# Patient Record
Sex: MALE | Race: WHITE | NOT HISPANIC OR LATINO | Employment: OTHER | URBAN - METROPOLITAN AREA
[De-identification: names, ages, dates, MRNs, and addresses within clinical notes are randomized per-mention and may not be internally consistent; named-entity substitution may affect disease eponyms.]

---

## 2017-01-05 ENCOUNTER — OFFICE VISIT (OUTPATIENT)
Dept: FAMILY MEDICINE | Facility: CLINIC | Age: 82
End: 2017-01-05

## 2017-01-05 ENCOUNTER — AMBULATORY - HEALTHEAST (OUTPATIENT)
Dept: ADMINISTRATIVE | Facility: REHABILITATION | Age: 82
End: 2017-01-05

## 2017-01-05 VITALS
OXYGEN SATURATION: 99 % | SYSTOLIC BLOOD PRESSURE: 151 MMHG | BODY MASS INDEX: 23.22 KG/M2 | HEART RATE: 75 BPM | WEIGHT: 156.8 LBS | RESPIRATION RATE: 18 BRPM | HEIGHT: 69 IN | TEMPERATURE: 97.5 F | DIASTOLIC BLOOD PRESSURE: 74 MMHG

## 2017-01-05 DIAGNOSIS — R42 VERTIGO: ICD-10-CM

## 2017-01-05 DIAGNOSIS — R42 VERTIGO: Primary | ICD-10-CM

## 2017-01-05 NOTE — PATIENT INSTRUCTIONS
What: Dizziness/balance therapy referral  Where: [facility] [address] [place], [place]   When: Wednesday 1/18/17 9:00AM   Who is with: Giovanna - therapist  Telephone: [phone]  Fax: [phone]  Any additional comments: Arrive 15 minutes early  Scheduled by: manasa

## 2017-01-05 NOTE — PROGRESS NOTES
HPI:       Kevin Saldana is a 81 year old  male with a significant past medical history of hypertension and new issue of balance/vertigo troubles who presents for follow up of concern(s) listed below:    #Trouble with balance, dizziness: began in October, occurred around the time of cough/cold symptoms- affecting his ambulation, normally would walk several blocks per day, now he is not doing this- has to have a friend drive him to run his errands whereas before he would walk  -Walking is fine in the house, unsteady feeling occurs when he is out of the house which makes him feel anxious- stopped taking meclizine because it was not helping  -Continues to wear compression stockings during the day  -MRI brain obtained at [facility] 12/16/16:  Results normal, no stroke seen, no masses to explain his symptoms- did comment on sinusitis but pt denies congestion/sinus pain/drainage  -Overall, says his symptoms are maybe a little better but still stopping him from walking outside which is bothersome  -No falls or syncope  -Not brought on by head movement/sitting/standing         PMHX:     Patient Active Problem List   Diagnosis     Acute urinary retention     Health Care Home     S/P TURP     AK (actinic keratosis)     Benign essential hypertension     H/O removal of neck cyst       Current Outpatient Prescriptions   Medication Sig Dispense Refill     fluticasone (FLONASE) 50 MCG/ACT spray Spray 1-2 sprays into both nostrils daily 1 Bottle 11     hydrochlorothiazide (HYDRODIURIL) 25 MG tablet Take 1 tablet (25 mg) by mouth daily 30 tablet 3     metoprolol (LOPRESSOR) 100 MG tablet Take 1 tablet (100 mg) by mouth 2 times daily 60 tablet 3     polyethylene glycol (MIRALAX/GLYCOLAX) powder Take 17 g by mouth daily 510 g 11     order for DME Equipment being ordered: FRANCISCO JAVIER stockings 1 Device 0          Allergies   Allergen Reactions     Nkda [No Known Drug Allergies]        No results found for this or any previous visit  "(from the past 24 hour(s)).         Review of Systems:   5-Point Review of Systems Negative -- Except as noted above.          Physical Exam:     Filed Vitals:    01/05/17 0843   BP: 167/86   Pulse: 75   Temp: 97.5  F (36.4  C)   TempSrc: Oral   Resp: 18   Height: 5' 8.5\" (174 cm)   Weight: 156 lb 12.8 oz (71.124 kg)   SpO2: 99%     Body mass index is 23.49 kg/(m^2).    Gen alert, pleasant NAD  Heart rrr no murmurs  Lungs clear without wheezing or crackles  Neuro Rhomberg negative, EOM intact without nystagmus, CN normal, ambulating without assistance  Ext no swelling or calf tenderness    Office Visit on 04/11/2016   Component Date Value Ref Range Status     Glucose 04/11/2016 85.0  60.0 - 109.0 mg/dL Final     Urea Nitrogen 04/11/2016 12.0  7.0 - 30.0 mg/dL Final     Creatinine 04/11/2016 1.0  0.8 - 1.5 mg/dL Final     Sodium 04/11/2016 133.0  133.0 - 144.0 mmol/L Final     Potassium 04/11/2016 4.0  3.4 - 5.3 mmol/L Final     Chloride 04/11/2016 94.0  94.0 - 109.0 mmol/L Final     Carbon Dioxide 04/11/2016 28.0  20.0 - 32.0 mmol/L Final     Calcium 04/11/2016 9.2  8.5 - 10.4 mg/dL Final     eGFR Calculated (Non Black Referen* 04/11/2016 76.4  >60.0 mL/min Final     eGFR Calculated (Black Reference) 04/11/2016 92.5  >60.0 mL/min Final       Assessment and Plan     (R42) Vertigo  (primary encounter diagnosis)  Comment: MRI of brain normal, no stroke- reviewed with pt.  Continuing to have symptoms, not positional to suggest BPPV.  May be inner ear/vestibular neuritis type picture.  It continues to affect his activity, he normally walked several blocks per day  Plan: PHYSICAL THERAPY REFERRAL         -I did review with pt that he did not have a stroke, he is not on a statin so it would be an option to start one for stroke prevention given that pt is quite healthy and active for an 81 year old.  He states he is not interested in starting a new medicine    I do recommend balance/dizziness therapy to help with his " symptoms- discussed with pt who agrees to give it a try     Referral placed, meet with Alicia today to schedule    Return in 4 weeks to f/u, sooner if needed      Options for treatment and follow-up care were reviewed with the patient and/or guardian. Kevin Saldana and/or guardian engaged in the decision making process and verbalized understanding of the options discussed and agreed with the final plan.      Caitlin Thomas MD      Precepted today with: Lavonne Guzman MD

## 2017-01-09 NOTE — PROGRESS NOTES
Preceptor Attestation:  Patient's case reviewed and discussed with Caitlin Thomas MD resident and I evaluated the patient. I agree with written assessment and plan of care.  Supervising Physician:  GILDA GOSS MD  [facility]

## 2017-01-18 ENCOUNTER — OFFICE VISIT - HEALTHEAST (OUTPATIENT)
Dept: OCCUPATIONAL THERAPY | Facility: REHABILITATION | Age: 82
End: 2017-01-18

## 2017-01-18 DIAGNOSIS — R26.81 UNSTEADINESS ON FEET: ICD-10-CM

## 2017-01-18 DIAGNOSIS — R42 DIZZINESS: ICD-10-CM

## 2017-01-27 DIAGNOSIS — I10 BENIGN ESSENTIAL HYPERTENSION: Primary | ICD-10-CM

## 2017-01-27 RX ORDER — HYDROCHLOROTHIAZIDE 25 MG/1
25 TABLET ORAL DAILY
Qty: 30 TABLET | Refills: 11 | Status: SHIPPED | OUTPATIENT
Start: 2017-01-27 | End: 2018-01-02

## 2017-02-01 ENCOUNTER — OFFICE VISIT - HEALTHEAST (OUTPATIENT)
Dept: OCCUPATIONAL THERAPY | Facility: REHABILITATION | Age: 82
End: 2017-02-01

## 2017-02-01 DIAGNOSIS — R42 DIZZINESS: ICD-10-CM

## 2017-02-01 DIAGNOSIS — R26.81 UNSTEADINESS ON FEET: ICD-10-CM

## 2017-05-19 DIAGNOSIS — I10 BENIGN ESSENTIAL HYPERTENSION: ICD-10-CM

## 2017-05-22 RX ORDER — METOPROLOL TARTRATE 100 MG
100 TABLET ORAL 2 TIMES DAILY
Qty: 60 TABLET | Refills: 11 | Status: SHIPPED | OUTPATIENT
Start: 2017-05-22 | End: 2017-06-23

## 2017-05-25 ENCOUNTER — OFFICE VISIT (OUTPATIENT)
Dept: FAMILY MEDICINE | Facility: CLINIC | Age: 82
End: 2017-05-25

## 2017-05-25 VITALS
WEIGHT: 158.6 LBS | BODY MASS INDEX: 24.04 KG/M2 | HEART RATE: 68 BPM | HEIGHT: 68 IN | DIASTOLIC BLOOD PRESSURE: 70 MMHG | OXYGEN SATURATION: 98 % | TEMPERATURE: 97.8 F | RESPIRATION RATE: 16 BRPM | SYSTOLIC BLOOD PRESSURE: 130 MMHG

## 2017-05-25 DIAGNOSIS — R26.89 BALANCE PROBLEMS: ICD-10-CM

## 2017-05-25 DIAGNOSIS — I10 BENIGN ESSENTIAL HYPERTENSION: Primary | ICD-10-CM

## 2017-05-25 NOTE — PATIENT INSTRUCTIONS
Blood pressure looks great!  Stay active, think about using a cane when you are out for walks just for extra balance    See you next month, thanks for coming to clinic  Dr Thomas

## 2017-05-25 NOTE — PROGRESS NOTES
HPI:       Kevin Saldana is a 82 year old  male with a significant past medical history of hypertension who presents for follow up of concern(s) listed below:    #Trouble with balance, dizziness: began in October 2016, occurred around the time of cough/cold symptoms.  Normal brain MRI 12/2016  -Referred to balance/dizzy therapy:  Went twice- was given exercises to do, did these at home for a couple months.  Not sure if it helped or not  -Overall symptoms better- dizzy sensation is better but balance trouble still there at times, able to walk a little better  -Now the issue seems to be with balance- notices that he staggers when he starts walking a little too fast.  Very active and enjoys walking outside.  Does not use a cane or walker   -No falls   -Recently moved to [facility]:  Moved to an apartment, this is going well         PMHX:     Patient Active Problem List   Diagnosis     Health Care Home     S/P TURP     AK (actinic keratosis)     Benign essential hypertension     H/O removal of neck cyst       Current Outpatient Prescriptions   Medication Sig Dispense Refill     metoprolol (LOPRESSOR) 100 MG tablet Take 1 tablet (100 mg) by mouth 2 times daily 60 tablet 11     hydrochlorothiazide (HYDRODIURIL) 25 MG tablet Take 1 tablet (25 mg) by mouth daily 30 tablet 11     fluticasone (FLONASE) 50 MCG/ACT spray Spray 1-2 sprays into both nostrils daily 1 Bottle 11     polyethylene glycol (MIRALAX/GLYCOLAX) powder Take 17 g by mouth daily 510 g 11     order for DME Equipment being ordered: FRANCISCO JAVIER stockings 1 Device 0          Allergies   Allergen Reactions     Nkda [No Known Drug Allergies]        No results found for this or any previous visit (from the past 24 hour(s)).         Review of Systems:   5-Point Review of Systems Negative -- Except as noted above.          Physical Exam:     Vitals:    05/25/17 0853 05/25/17 0856   BP: 163/79 130/70   Pulse: 79 68   Resp: 16    Temp: 97.8  F (36.6  C)    TempSrc: Oral   "  SpO2: 98%    Weight: 158 lb 9.6 oz (71.9 kg)    Height: 5' 8.25\" (173.4 cm)      Body mass index is 23.94 kg/(m^2).    GENERAL APPEARANCE: healthy, alert and no distress  EYES: Eyes grossly normal to inspection, PERRL and conjunctivae and sclerae normal  NECK: no adenopathy, no asymmetry, masses, or scars and thyroid normal to palpation  RESP: lungs clear to auscultation - no rales, rhonchi or wheezes  CV: regular rates and rhythm, normal S1 S2, no S3 or S4, no murmur, click or rub, no peripheral edema and peripheral pulses strong  MS: no musculoskeletal defects are noted and gait is age appropriate without ataxia  SKIN: no suspicious lesions or rashes  NEURO: mentation intact, speech normal and cranial nerves 2-12 intact  PSYCH: mentation appears normal and affect normal/bright      Assessment and Plan     (I10) Benign essential hypertension  (primary encounter diagnosis)  Comment: well-controlled, goal BP <150/90 given age and balance/dizziness issues in the apst  Plan: Continue metoprolol, HCTZ.  No changes made today    (R26.89) Balance problems  Comment: gradually improving, dizziness sensation has resolved.  Seems to occur when walking briskly outside- encouraged patient to consider using a cane for his outdoor walking, says he will think about it- declined today      F/u in 1 month, sooner if needed    Options for treatment and follow-up care were reviewed with the patient and/or guardian. Kevin Saldana and/or guardian engaged in the decision making process and verbalized understanding of the options discussed and agreed with the final plan.      Caitlin Thomas MD      Precepted today with: Dr Tolentino    "

## 2017-05-25 NOTE — MR AVS SNAPSHOT
After Visit Summary   2017    Kevin Saldana    MRN: [id number]           Patient Information     Date Of Birth          1935        Visit Information        Provider Department      2017 9:00 AM Caitlin Thomas MD [facility]        Care Instructions    Blood pressure looks great!  Stay active, think about using a cane when you are out for walks just for extra balance    See you next month, thanks for coming to clinic  Dr Thomas          Follow-ups after your visit        Who to contact     Please call your clinic at [phone] to:    Ask questions about your health    Make or cancel appointments    Discuss your medicines    Learn about your test results    Speak to your doctor   If you have compliments or concerns about an experience at your clinic, or if you wish to file a complaint, please contact [facility] Patient Relations at [phone] or email us at [email or web address]         Additional Information About Your Visit        MyChart Information     Front Desk HQt is an electronic gateway that provides easy, online access to your medical records. With Microbion, you can request a clinic appointment, read your test results, renew a prescription or communicate with your care team.     To sign up for Front [employer]k HQt visit the website at [email or web address]   You will be asked to enter the access code listed below, as well as some personal information. Please follow the directions to create your username and password.     Your access code is: [email or web address]  Expires: 2017  9:32 AM     Your access code will  in 90 days. If you need help or a new code, please contact your [facility] or call [phone] for assistance.        Care EveryWhere ID     This is your Care EveryWhere ID. This could be used by other organizations to access your [place] medical records  [id number]        Your Vitals Were      "Pulse Temperature Respirations Height Pulse Oximetry BMI (Body Mass Index)    68 97.8  F (36.6  C) (Oral) 16 5' 8.25\" (173.4 cm) 98% 23.94 kg/m2       Blood Pressure from Last 3 Encounters:   05/25/17 130/70   01/05/17 151/74   12/09/16 151/78    Weight from Last 3 Encounters:   05/25/17 158 lb 9.6 oz (71.9 kg)   01/05/17 156 lb 12.8 oz (71.1 kg)   12/09/16 154 lb 6.4 oz (70 kg)              Today, you had the following     No orders found for display       Primary Care Provider Office Phone # Fax #    Caitlin Brii Thomas [phone]       [facility] [address] [place] [address]        Thank you!     Thank you for choosing [facility]  for your care. Our goal is always to provide you with excellent care. Hearing back from our patients is one way we can continue to improve our services. Please take a few minutes to complete the written survey that you may receive in the mail after your visit with us. Thank you!             Your Updated Medication List - Protect others around you: Learn how to safely use, store and throw away your medicines at www.disposemymeds.org.          This list is accurate as of: 5/25/17  9:32 AM.  Always use your most recent med list.                   Brand Name Dispense Instructions for use    fluticasone 50 MCG/ACT spray    FLONASE    1 Bottle    Spray 1-2 sprays into both nostrils daily       hydrochlorothiazide 25 MG tablet    HYDRODIURIL    30 tablet    Take 1 tablet (25 mg) by mouth daily       metoprolol 100 MG tablet    LOPRESSOR    60 tablet    Take 1 tablet (100 mg) by mouth 2 times daily       order for DME     1 Device    Equipment being ordered: FRANCISCO JAVIER stockings       polyethylene glycol powder    MIRALAX/GLYCOLAX    510 g    Take 17 g by mouth daily         "

## 2017-05-25 NOTE — PROGRESS NOTES
Preceptor Attestation:  Patient's case reviewed and discussed with Caitlin Thomas MD Patient seen and discussed with the resident.. I agree with assessment and plan of care.  Supervising Physician:  Viviana Tolentino DO  [facility]

## 2017-06-23 ENCOUNTER — OFFICE VISIT (OUTPATIENT)
Dept: FAMILY MEDICINE | Facility: CLINIC | Age: 82
End: 2017-06-23

## 2017-06-23 VITALS
SYSTOLIC BLOOD PRESSURE: 156 MMHG | OXYGEN SATURATION: 100 % | DIASTOLIC BLOOD PRESSURE: 77 MMHG | HEART RATE: 65 BPM | TEMPERATURE: 97.4 F | HEIGHT: 68 IN | WEIGHT: 157 LBS | BODY MASS INDEX: 23.79 KG/M2

## 2017-06-23 DIAGNOSIS — I10 BENIGN ESSENTIAL HYPERTENSION: Primary | ICD-10-CM

## 2017-06-23 DIAGNOSIS — R94.4 DECREASED GFR: ICD-10-CM

## 2017-06-23 DIAGNOSIS — R26.89 BALANCE PROBLEMS: ICD-10-CM

## 2017-06-23 DIAGNOSIS — K59.00 CONSTIPATION, UNSPECIFIED CONSTIPATION TYPE: ICD-10-CM

## 2017-06-23 LAB
BUN SERPL-MCNC: 16 MG/DL (ref 7–30)
CALCIUM SERPL-MCNC: 9.6 MG/DL (ref 8.5–10.4)
CHLORIDE SERPLBLD-SCNC: 99 MMOL/L (ref 94–109)
CO2 SERPL-SCNC: 29 MMOL/L (ref 20–32)
CREAT SERPL-MCNC: 1.3 MG/DL (ref 0.8–1.5)
EGFR CALCULATED (BLACK REFERENCE): 68.1 ML/MIN
EGFR CALCULATED (NON BLACK REFERENCE): 56.3 ML/MIN
GLUCOSE SERPL-MCNC: 103 MG/DL (ref 60–109)
POTASSIUM SERPL-SCNC: 4.1 MMOL/L (ref 3.4–5.3)
SODIUM SERPL-SCNC: 141 MMOL/L (ref 133–144)

## 2017-06-23 RX ORDER — POLYETHYLENE GLYCOL 3350 17 G/17G
17 POWDER, FOR SOLUTION ORAL DAILY
Qty: 510 G | Refills: 11 | Status: SHIPPED | OUTPATIENT
Start: 2017-06-23 | End: 2018-01-02

## 2017-06-23 RX ORDER — METOPROLOL TARTRATE 100 MG
TABLET ORAL
Qty: 60 TABLET | Refills: 11 | Status: SHIPPED | OUTPATIENT
Start: 2017-06-23 | End: 2018-01-02

## 2017-06-23 RX ORDER — LISINOPRIL 10 MG/1
10 TABLET ORAL DAILY
Qty: 30 TABLET | Refills: 1 | Status: SHIPPED | OUTPATIENT
Start: 2017-06-23 | End: 2017-07-21

## 2017-06-23 NOTE — MR AVS SNAPSHOT
After Visit Summary   6/23/2017    Kevin Saldana    MRN: [id number]           Patient Information     Date Of Birth          1935        Visit Information        Provider Department      6/23/2017 10:20 AM Ilsa Wayne, DO [facility]        Today's Diagnoses     Benign essential hypertension    -  1    Balance problems        Constipation, unspecified constipation type          Care Instructions    Orquidea Brown,    Thank you so much for coming into clinic today. It was very nice to meet you.    1. We are checking blood tests today to check your kidney function and electrolytes. We will call you with these results.     2. Your blood pressure is elevated today. Our goal is to keep your blood pressure below 150/90. Today, your blood pressure is 159/80. This is too high. I would like to add lisinopril to your medication regimen. This medication is a first line medication for hypertension.   For metoprolol, take 1/2 tablet in the morning and continue your full tablet in the evening.           Follow-ups after your visit        Who to contact     Please call your clinic at [phone] to:    Ask questions about your health    Make or cancel appointments    Discuss your medicines    Learn about your test results    Speak to your doctor   If you have compliments or concerns about an experience at your clinic, or if you wish to file a complaint, please contact [facility] Patient Relations at [phone] or email us at [email or web address]         Additional Information About Your Visit        MyChart Information     Pixafy is an electronic gateway that provides easy, online access to your medical records. With Pixafy, you can request a clinic appointment, read your test results, renew a prescription or communicate with your care team.     To sign up for [employer] Agent[employer] visit the website at [email or web address]   You will be asked to enter the access  "code listed below, as well as some personal information. Please follow the directions to create your username and password.     Your access code is: [email or web address]  Expires: 2017  9:32 AM     Your access code will  in 90 days. If you need help or a new code, please contact your [facility] or call [phone] for assistance.        Care EveryWhere ID     This is your Care EveryWhere ID. This could be used by other organizations to access your [place] medical records  [id number]        Your Vitals Were     Pulse Temperature Height Pulse Oximetry BMI (Body Mass Index)       65 97.4  F (36.3  C) (Oral) 5' 8\" (172.7 cm) 100% 23.87 kg/m2        Blood Pressure from Last 3 Encounters:   17 156/77   17 130/70   17 151/74    Weight from Last 3 Encounters:   17 157 lb (71.2 kg)   17 158 lb 9.6 oz (71.9 kg)   17 156 lb 12.8 oz (71.1 kg)              We Performed the Following     Basic Metabolic Panel ([facility]) - Results < 1 hr          Today's Medication Changes          These changes are accurate as of: 17 10:37 AM.  If you have any questions, ask your nurse or doctor.               Start taking these medicines.        Dose/Directions    lisinopril 10 MG tablet   Commonly known as:  PRINIVIL/ZESTRIL   Used for:  Benign essential hypertension   Started by:  Ilsa Wayne DO        Dose:  10 mg   Take 1 tablet (10 mg) by mouth daily   Quantity:  30 tablet   Refills:  1         These medicines have changed or have updated prescriptions.        Dose/Directions    metoprolol 100 MG tablet   Commonly known as:  LOPRESSOR   This may have changed:    - how much to take  - how to take this  - when to take this  - additional instructions   Used for:  Benign essential hypertension   Changed by:  Ilsa Wayne DO        Take 50 mg (half tablet) in the morning and 100 mg (1 full tablet) in the evening.   Quantity:  60 tablet   Refills:  11       "      Where to get your medicines      These medications were sent to [id number] - [place], [place] - [address] [place] [address] AT [facility]  [address], [place] [address]     Phone:  [phone]     lisinopril 10 MG tablet    metoprolol 100 MG tablet    polyethylene glycol powder                Primary Care Provider Office Phone # Fax #    Caitlin Brii Thomas [phone]       [facility] [address] [facility] [address]        Equal Access to Services     [facility]: Hadii aad ku hadasho Soomaali, waaxda luqadaha, qaybta kaalmada adeegyada, waxay kentonin hayaan adejaxon kelsey . So [facility] [phone].    ATENCIÓN: Si habla español, tiene a nguyen disposición servicios gratuitos de asistencia lingüística. [facility] [phone].    We comply with applicable federal civil rights laws and [place] laws. We do not discriminate on the basis of race, color, national origin, age, disability sex, sexual orientation or gender identity.            Thank you!     Thank you for choosing [facility]  for your care. Our goal is always to provide you with excellent care. Hearing back from our patients is one way we can continue to improve our services. Please take a few minutes to complete the written survey that you may receive in the mail after your visit with us. Thank you!             Your Updated Medication List - Protect others around you: Learn how to safely use, store and throw away your medicines at www.disposemymeds.org.          This list is accurate as of: 6/23/17 10:37 AM.  Always use your most recent med list.                   Brand Name Dispense Instructions for use Diagnosis    fluticasone 50 MCG/ACT spray    FLONASE    1 Bottle    Spray 1-2 sprays into both nostrils daily    Dizziness       hydrochlorothiazide 25 MG tablet    HYDRODIURIL    30 tablet    Take 1 tablet (25 mg) by mouth daily    Benign essential  hypertension       lisinopril 10 MG tablet    PRINIVIL/ZESTRIL    30 tablet    Take 1 tablet (10 mg) by mouth daily    Benign essential hypertension       metoprolol 100 MG tablet    LOPRESSOR    60 tablet    Take 50 mg (half tablet) in the morning and 100 mg (1 full tablet) in the evening.    Benign essential hypertension       order for DME     1 Device    Equipment being ordered: FRANCISCO JAVIER stockings    Orthostasis       polyethylene glycol powder    MIRALAX/GLYCOLAX    510 g    Take 17 g by mouth daily    Constipation, unspecified constipation type

## 2017-06-23 NOTE — PATIENT INSTRUCTIONS
Orquidea Brown,    Thank you so much for coming into clinic today. It was very nice to meet you.    1. We are checking blood tests today to check your kidney function and electrolytes. We will call you with these results.     2. Your blood pressure is elevated today. Our goal is to keep your blood pressure below 150/90. Today, your blood pressure is 159/80. This is too high. I would like to add lisinopril to your medication regimen. This medication is a first line medication for hypertension.   For metoprolol, take 1/2 tablet in the morning and continue your full tablet in the evening.

## 2017-06-23 NOTE — PROGRESS NOTES
"       HPI:       Kevin Saldana is a 82 year old  male with a significant past medical history of HTN, \"balance problems\", and s/p TURP who presents for the following.     F/U HYPERTENSION - BP: (#1) 159/80 (#2) 156/77  Pulse: 65  - Meds: HCTZ 25 daily & metoprolol 100 BID  - Last BMP: 4/11/2016 - K 4.0, Cr 1.0, eGFR 76  - Has constipation (see below)  - No headaches, no lightheadedness, no lower extremity edema, no dyspnea on exertion, no orthopnea   - Metoprolol: took due to \"rapid heart beat\" - noticed with exertion     F/U BALANCE PROBLEMS -   - Last fall, had pain in leg and went to hospital, dx hamstring strain. Given pain medications for night. Took pain pill and made dizzy so quit taking. Dizziness never went away.   - No other changes in medications.   - Still having difficulties w/ balance.  - No orthostasis. Not exacerbated by turning head. No lightheadedness, no dizziness, no presyncope.   - Notices when up walking \"a distance\". Walks a few blocks before noticing.   - Went to dizziness center, got exercises, didn't seem to help  - using cane?    CONSTIPATION -   - Meds: Miralax, takes about every other day   - Soft stool daily w/ Miralax  - Well controlled     UROLOGY - s/p TURP, has chronic suprapubic catheter & leg bag, follows w/ urology every year         PMHX:     Patient Active Problem List   Diagnosis     Health Care Home     S/P TURP     AK (actinic keratosis)     Benign essential hypertension     H/O removal of neck cyst     Balance problems     Current Outpatient Prescriptions   Medication     metoprolol (LOPRESSOR) 100 MG tablet     hydrochlorothiazide (HYDRODIURIL) 25 MG tablet     fluticasone (FLONASE) 50 MCG/ACT spray     order for DME     polyethylene glycol (MIRALAX/GLYCOLAX) powder     No current facility-administered medications for this visit.      Allergies   Allergen Reactions     Nkda [No Known Drug Allergies]        Social History     Social History     Marital status:     " " Spouse name: N/A     Number of children: N/A     Years of education: N/A     Occupational History     Not on file.     Social History Main Topics     Smoking status: Former Smoker     Smokeless tobacco: Never Used      Comment: 2 packs per week, quit 40 years prior     Alcohol use 0.0 oz/week     0 Standard drinks or equivalent per week      Comment: 1 Beer once in a while     Drug use: No     Sexual activity: No     Other Topics Concern     Not on file     Social History Narrative            Review of Systems:   See HPI above.           Physical Exam:     Vitals:    06/23/17 0950 06/23/17 1022   BP: 159/80 156/77   BP Location: Right arm Right arm   Patient Position: Chair Chair   Cuff Size: Adult Regular Adult Regular   Pulse: 65    Temp: 97.4  F (36.3  C)    TempSrc: Oral    SpO2: 100%    Weight: 157 lb (71.2 kg)    Height: 5' 8\" (172.7 cm)      Body mass index is 23.87 kg/(m^2).    GENERAL: Kevin is a thin, elderly male, pleasant, in no acute distress  HEENT: atraumatic, normocephalic, sclera white, no nasal discharge  HEART: regular rate and rhythm, no murmurs  LUNGS: diminished breath sounds, clear bilaterally, unlabored  : leg bag present with clear yellow urine   NEURO: lower extremity strength 4/5 hip flexors bilaterally, 5/5 knee flexors / knee extensors / ankle dorsiflexion / ankle plantarflexion bilaterally; sensation intact; patellar reflexes +2/4 bilaterally  SKIN: flesh colored papule on nasal bridge - per patient is stable      Assessment and Plan     1. Benign essential hypertension  Uncontrolled. Has not been on lisinopril, so will add lisinopril.   Due for BMP. Will call with results.   Return for recheck BP and BMP in 2-4 weeks     2. Balance problems  Unclear etiology. Per history, does not sound orthostatic, BPPV, or central process. As not able to clearly describe, will evaluate for other etiologies. May be secondary to beta blockage, with exertion and physiologic vasodilation, heart rate " is not able to compensate.   - Will see if improves w/ reduction in BB dosage. Will not get rid of BB, as history of episodic tachycardia.   - If not able to ambulate without occurrence of symptoms, next visit will plan for ambulation in clinic until symptomatic. Once develops symptoms, will repeat neurological exam. If neurological deficits (weakness), may be spinal stenosis.      3. Constipation, unspecified constipation type  Most likely secondary to high dose beta blockade. Symptoms controlled with Miralax. Refilling.   - polyethylene glycol (MIRALAX/GLYCOLAX) powder; Take 17 g by mouth daily  Dispense: 510 g; Refill: 11    4. 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 GFR has been decreasing, but first recorded value < 60.   Continue to monitor. If present > 3 months, then will meet criteria for CKD3.     Options for treatment and follow-up care were reviewed with the patient and/or guardian. Kevin Saldana and/or guardian engaged in the decision making process and verbalized understanding of the options discussed and agreed with the final plan.    Ilsa Wayne DO      Precepted today with: Yg Shell MD

## 2017-06-23 NOTE — PROGRESS NOTES
Preceptor Attestation:  Patient's case reviewed and discussed with Ilsa Wayne DO resident and I evaluated the patient. I agree with written assessment and plan of care.  Supervising Physician:Yg Shell MD    [facility]

## 2017-07-21 ENCOUNTER — OFFICE VISIT (OUTPATIENT)
Dept: FAMILY MEDICINE | Facility: CLINIC | Age: 82
End: 2017-07-21

## 2017-07-21 VITALS
TEMPERATURE: 97.9 F | HEIGHT: 69 IN | SYSTOLIC BLOOD PRESSURE: 130 MMHG | OXYGEN SATURATION: 97 % | BODY MASS INDEX: 23.13 KG/M2 | WEIGHT: 156.2 LBS | HEART RATE: 71 BPM | DIASTOLIC BLOOD PRESSURE: 74 MMHG | RESPIRATION RATE: 18 BRPM

## 2017-07-21 DIAGNOSIS — I10 BENIGN ESSENTIAL HYPERTENSION: Primary | ICD-10-CM

## 2017-07-21 DIAGNOSIS — R26.89 BALANCE PROBLEMS: ICD-10-CM

## 2017-07-21 LAB
BUN SERPL-MCNC: 30 MG/DL (ref 7–30)
CALCIUM SERPL-MCNC: 9.1 MG/DL (ref 8.5–10.4)
CHLORIDE SERPLBLD-SCNC: 100 MMOL/L (ref 94–109)
CO2 SERPL-SCNC: 30 MMOL/L (ref 20–32)
CREAT SERPL-MCNC: 1.4 MG/DL (ref 0.8–1.5)
EGFR CALCULATED (BLACK REFERENCE): 62.6 ML/MIN
EGFR CALCULATED (NON BLACK REFERENCE): 51.7 ML/MIN
GLUCOSE SERPL-MCNC: 100 MG/DL (ref 60–109)
POTASSIUM SERPL-SCNC: 4.7 MMOL/L (ref 3.4–5.3)
SODIUM SERPL-SCNC: 139 MMOL/L (ref 133–144)

## 2017-07-21 RX ORDER — LISINOPRIL 10 MG/1
10 TABLET ORAL DAILY
Qty: 90 TABLET | Refills: 3 | Status: SHIPPED | OUTPATIENT
Start: 2017-07-21 | End: 2018-01-02

## 2017-07-21 NOTE — MR AVS SNAPSHOT
After Visit Summary   2017    Kevin Saldana    MRN: [id number]           Patient Information     Date Of Birth          1935        Visit Information        Provider Department      2017 10:20 AM Ilsa Wayne, DO [facility]        Today's Diagnoses     Benign essential hypertension    -  1    Balance problems           Follow-ups after your visit        Follow-up notes from your care team     Return in about 1 month (around 2017) for BP Recheck.      Who to contact     Please call your clinic at [phone] to:    Ask questions about your health    Make or cancel appointments    Discuss your medicines    Learn about your test results    Speak to your doctor   If you have compliments or concerns about an experience at your clinic, or if you wish to file a complaint, please contact [facility] Patient Relations at [phone] or email us at [email or web address]         Additional Information About Your Visit        MyChart Information     [employer] is an electronic gateway that provides easy, online access to your medical records. With [employer], you can request a clinic appointment, read your test results, renew a prescription or communicate with your care team.     To sign up for [employer] visit the website at [email or web address]   You will be asked to enter the access code listed below, as well as some personal information. Please follow the directions to create your username and password.     Your access code is: [email or web address]  Expires: 2017  9:32 AM     Your access code will  in 90 days. If you need help or a new code, please contact your [facility] or call [phone] for assistance.        Care EveryWhere ID     This is your Care EveryWhere ID. This could be used by other organizations to access your [facility] medical records  [id number]        Your Vitals Were     Pulse  "Temperature Respirations Height Pulse Oximetry BMI (Body Mass Index)    71 97.9  F (36.6  C) (Oral) 18 5' 9\" (175.3 cm) 97% 23.07 kg/m2       Blood Pressure from Last 3 Encounters:   07/21/17 130/74   06/23/17 156/77   05/25/17 130/70    Weight from Last 3 Encounters:   07/21/17 156 lb 3.2 oz (70.9 kg)   06/23/17 157 lb (71.2 kg)   05/25/17 158 lb 9.6 oz (71.9 kg)              We Performed the Following     Basic Metabolic Panel ([facility]) - Results < 1 hr          Where to get your medicines      These medications were sent to [id number] - [place], [place] - [address] AT [facility] & [address], [place] [address]     Phone:  [phone]     lisinopril 10 MG tablet          Primary Care Provider Office Phone # Fax #    Shira Damon [phone]       [facility] [address] [place] [address]        Equal Access to Services     [facility] AH: Hadii aad ku hadasho Soomaali, waaxda luqadaha, qaybta kaalmada adeegyada, waxay kentonin haysoumyan uvaldo kelsey . So [facility] [phone].    ATENCIÓN: Si habla español, tiene a nguyen disposición servicios gratuitos de asistencia lingüística. Llame al [phone].    We comply with applicable federal civil rights laws and [place] laws. We do not discriminate on the basis of race, color, national origin, age, disability sex, sexual orientation or gender identity.            Thank you!     Thank you for choosing [facility]  for your care. Our goal is always to provide you with excellent care. Hearing back from our patients is one way we can continue to improve our services. Please take a few minutes to complete the written survey that you may receive in the mail after your visit with us. Thank you!             Your Updated Medication List - Protect others around you: Learn how to safely use, store and throw away your medicines at www.IdealSeatemymeds.org.          This " list is accurate as of: 7/21/17 11:24 AM.  Always use your most recent med list.                   Brand Name Dispense Instructions for use Diagnosis    fluticasone 50 MCG/ACT spray    FLONASE    1 Bottle    Spray 1-2 sprays into both nostrils daily    Dizziness       hydrochlorothiazide 25 MG tablet    HYDRODIURIL    30 tablet    Take 1 tablet (25 mg) by mouth daily    Benign essential hypertension       lisinopril 10 MG tablet    PRINIVIL/ZESTRIL    90 tablet    Take 1 tablet (10 mg) by mouth daily    Benign essential hypertension       metoprolol 100 MG tablet    LOPRESSOR    60 tablet    Take 50 mg (half tablet) in the morning and 100 mg (1 full tablet) in the evening.    Benign essential hypertension       order for DME     1 Device    Equipment being ordered: FRANCISCO JAVIER stockings    Orthostasis       polyethylene glycol powder    MIRALAX/GLYCOLAX    510 g    Take 17 g by mouth daily    Constipation, unspecified constipation type

## 2017-07-21 NOTE — PROGRESS NOTES
Preceptor Attestation:  Patient's case reviewed and discussed with Ilsa Wayne, DO Patient seen and discussed with the resident.. I agree with assessment and plan of care.  Supervising Physician:  Kylah Choi MD  [facility]

## 2017-07-21 NOTE — PROGRESS NOTES
"       HPI:       Kevin Saldana is a 82 year old  male with a significant past medical history of HTN and balance issues who presents for follow up of the following.     BP FOLLOW UP -   - Tolerating lisinopril okay  - Due for BMP today  - No side effects - cough, orthostasis, lightheadedness, dizziness, or presyncope     BALANCE -   - Improving   - Has done PT before, did not find it helpful  - Dr. Thomas recommended a cane, he picked one up and has been using it    HEALTH MAINTENANCE -   - wonders if he should get an MMR booster, heard about it on the radio  - declines exposure to measles          PMHX:     Patient Active Problem List   Diagnosis     Health Care Home     S/P TURP     AK (actinic keratosis)     Benign essential hypertension     H/O removal of neck cyst     Balance problems     Decreased GFR       Current Outpatient Prescriptions   Medication Sig Dispense Refill     polyethylene glycol (MIRALAX/GLYCOLAX) powder Take 17 g by mouth daily 510 g 11     lisinopril (PRINIVIL/ZESTRIL) 10 MG tablet Take 1 tablet (10 mg) by mouth daily 30 tablet 1     metoprolol (LOPRESSOR) 100 MG tablet Take 50 mg (half tablet) in the morning and 100 mg (1 full tablet) in the evening. 60 tablet 11     hydrochlorothiazide (HYDRODIURIL) 25 MG tablet Take 1 tablet (25 mg) by mouth daily 30 tablet 11     fluticasone (FLONASE) 50 MCG/ACT spray Spray 1-2 sprays into both nostrils daily 1 Bottle 11     order for DME Equipment being ordered: FRANCISCO JAVIER stockings 1 Device 0        Allergies   Allergen Reactions     Nkda [No Known Drug Allergies]             Review of Systems:   See HPI above           Physical Exam:     /74 (BP Location: Right arm, Patient Position: Chair, Cuff Size: Adult Regular)  Pulse 71  Temp 97.9  F (36.6  C) (Oral)  Resp 18  Ht 5' 9\" (175.3 cm)  Wt 156 lb 3.2 oz (70.9 kg)  SpO2 97%  BMI 23.07 kg/m2    GENERAL: alert, oriented, well groomed, no acute distress  HEART: regular rate and rhythm, no " murmurs  LUNGS: clear to auscultation bilaterally, unlabored  EXTREMITIES: pulses intact, capillary refill < 2 seconds, no lower extremity edema      Assessment and Plan     1. Benign essential hypertension  BP now controlled. Refilled lisinopril x 1 year. Recheck renal function and electrolytes today to ensure remained normal after initiation of lisinopril.   - lisinopril (PRINIVIL/ZESTRIL) 10 MG tablet; Take 1 tablet (10 mg) by mouth daily  Dispense: 90 tablet; Refill: 3  - Basic Metabolic Panel ([facility]) - Results < 1 hr    2. Balance problems  See last note for further details. Etiology is unclear and does not fit with BPPV, orthostasis, cardiac, or central porcess. Seems to be improved some after decreasing metoprolol dosage. Congratulated on initiation of cane usage. Recommended referral to physical therapy to further evaluate balance and gait, exercises to improve strength to help maintain independence, but Kevin declines. He has previously done PT and continues to do some exercises at home.    3. Health Maintenance   Does not need an MMR booster.     Options for treatment and follow-up care were reviewed with the patient and/or guardian. Kevni Saldana and/or guardian engaged in the decision making process and verbalized understanding of the options discussed and agreed with the final plan.    Ilsa Wayne DO      Precepted today with: Kylah Choi MD

## 2017-08-08 ENCOUNTER — TELEPHONE (OUTPATIENT)
Dept: FAMILY MEDICINE | Facility: CLINIC | Age: 82
End: 2017-08-08

## 2017-08-08 NOTE — TELEPHONE ENCOUNTER
Care coordination for medica called she will transfer care system to [facility] with his care through [facility]    Coordinator was looking for housing options in the area for pt- he has an apt until December and then wants to be closer to this area of [facility]  Pams number is [phone]    Lbetz

## 2017-08-21 ENCOUNTER — ALLIED HEALTH/NURSE VISIT (OUTPATIENT)
Dept: FAMILY MEDICINE | Facility: CLINIC | Age: 82
End: 2017-08-21

## 2017-08-21 VITALS
DIASTOLIC BLOOD PRESSURE: 74 MMHG | BODY MASS INDEX: 23.46 KG/M2 | HEART RATE: 73 BPM | SYSTOLIC BLOOD PRESSURE: 137 MMHG | RESPIRATION RATE: 16 BRPM | WEIGHT: 158.4 LBS | TEMPERATURE: 98.3 F | HEIGHT: 69 IN | OXYGEN SATURATION: 99 %

## 2017-08-21 DIAGNOSIS — Z01.30 BLOOD PRESSURE CHECK: Primary | ICD-10-CM

## 2017-09-19 ENCOUNTER — ALLIED HEALTH/NURSE VISIT (OUTPATIENT)
Dept: FAMILY MEDICINE | Facility: CLINIC | Age: 82
End: 2017-09-19

## 2017-09-19 VITALS
TEMPERATURE: 98.2 F | WEIGHT: 158 LBS | BODY MASS INDEX: 23.95 KG/M2 | HEART RATE: 73 BPM | SYSTOLIC BLOOD PRESSURE: 129 MMHG | DIASTOLIC BLOOD PRESSURE: 73 MMHG | HEIGHT: 68 IN | OXYGEN SATURATION: 98 %

## 2017-09-19 DIAGNOSIS — Z01.30 BLOOD PRESSURE CHECK: Primary | ICD-10-CM

## 2017-10-18 ENCOUNTER — ALLIED HEALTH/NURSE VISIT (OUTPATIENT)
Dept: FAMILY MEDICINE | Facility: CLINIC | Age: 82
End: 2017-10-18

## 2017-10-18 VITALS
TEMPERATURE: 97.4 F | SYSTOLIC BLOOD PRESSURE: 145 MMHG | DIASTOLIC BLOOD PRESSURE: 69 MMHG | BODY MASS INDEX: 24.55 KG/M2 | HEART RATE: 75 BPM | HEIGHT: 68 IN | OXYGEN SATURATION: 98 % | WEIGHT: 162 LBS | RESPIRATION RATE: 16 BRPM

## 2017-10-18 DIAGNOSIS — I10 BENIGN ESSENTIAL HYPERTENSION: Primary | ICD-10-CM

## 2017-10-18 NOTE — Clinical Note
Please review and advise on nursing notes for the nurse visit as systolic was high (145) and have delegated MA follow up if need be.  Thank you, Shirley Roper CMA

## 2017-10-18 NOTE — NURSING NOTE
Spoke to Dr Abernathy as BP systolic was high. Patient reports taking medication as directed. Dr abernathy suggested no need to see provider as of right now but to route to PCP and inform them and see how they would like to proceed with blood pressure and if dose change is necessary. Spoke to patient and patient request PCP be changed to Dr Wayne as he has been seeing her and knows more about him and his situation. I have changed PCP to marianela. Routing an inbasket to Dr Wayne regarding BP recheck if follow up is indicated.

## 2017-10-18 NOTE — MR AVS SNAPSHOT
After Visit Summary   10/18/2017    Kevin Saldana    MRN: [id number]           Patient Information     Date Of Birth          1935        Visit Information        Provider Department      10/18/2017 11:00 AM Nurse, Rena Ump [facility]        Today's Diagnoses     Benign essential hypertension    -  1       Follow-ups after your visit        Your next 10 appointments already scheduled     Oct 18, 2017 11:00 AM CDT   Nurse Visit with [facility] Nurse   [facility] ([facility])    [address] [place] [address]   [phone]            2017  1:20 PM CST   Return Visit with Ilsa Wayne DO   [facility] ([facility])    [address] [place] [address]   [phone]              Who to contact     Please call your clinic at [phone] to:    Ask questions about your health    Make or cancel appointments    Discuss your medicines    Learn about your test results    Speak to your doctor   If you have compliments or concerns about an experience at your clinic, or if you wish to file a complaint, please contact [facility] Patient Relations at [phone] or email us at [email or web address]         Additional Information About Your Visit        MyChart Information     [employer] is an electronic gateway that provides easy, online access to your medical records. With [employer], you can request a clinic appointment, read your test results, renew a prescription or communicate with your care team.     To sign up for [employer] visit the website at [email or web address]   You will be asked to enter the access code listed below, as well as some personal information. Please follow the directions to create your username and password.     Your access code is: [email or web address]  Expires: 2017 10:09 AM     Your access code will  in 90 days. If you need help or a new code, please contact your  "[facility] or call [phone] for assistance.        Care EveryWhere ID     This is your Care EveryWhere ID. This could be used by other organizations to access your [facility] medical records  [id number]        Your Vitals Were     Pulse Temperature Respirations Height Pulse Oximetry BMI (Body Mass Index)    75 97.4  F (36.3  C) (Oral) 16 5' 8.2\" (173.2 cm) 98% 24.49 kg/m2       Blood Pressure from Last 3 Encounters:   10/18/17 145/69   09/19/17 129/73   08/21/17 137/74    Weight from Last 3 Encounters:   10/18/17 162 lb (73.5 kg)   09/19/17 158 lb (71.7 kg)   08/21/17 158 lb 6.4 oz (71.8 kg)              Today, you had the following     No orders found for display       Primary Care Provider Office Phone # Fax #    Ilsa Ramos DO [facility] [phone]       [facility] [address] [place] [address]        Equal Access to Services     [facility] : Hadii aad ku hadasho Soomaali, waaxda luqadaha, qaybta kaalmada adeegyada, waxay idiin hayaan uvaldo kelsey . So [facility] [phone].    ATENCIÓN: Si habla español, tiene a nguyen disposición servicios gratuitos de asistencia lingüística. Llame al [phone].    We comply with applicable federal civil rights laws and [place] laws. We do not discriminate on the basis of race, color, national origin, age, disability, sex, sexual orientation, or gender identity.            Thank you!     Thank you for choosing [facility]  for your care. Our goal is always to provide you with excellent care. Hearing back from our patients is one way we can continue to improve our services. Please take a few minutes to complete the written survey that you may receive in the mail after your visit with us. Thank you!             Your Updated Medication List - Protect others around you: Learn how to safely use, store and throw away your medicines at www.disposemymeds.org.          This list is accurate as of: " 10/18/17 10:23 AM.  Always use your most recent med list.                   Brand Name Dispense Instructions for use Diagnosis    fluticasone 50 MCG/ACT spray    FLONASE    1 Bottle    Spray 1-2 sprays into both nostrils daily    Dizziness       hydrochlorothiazide 25 MG tablet    HYDRODIURIL    30 tablet    Take 1 tablet (25 mg) by mouth daily    Benign essential hypertension       lisinopril 10 MG tablet    PRINIVIL/ZESTRIL    90 tablet    Take 1 tablet (10 mg) by mouth daily    Benign essential hypertension       metoprolol 100 MG tablet    LOPRESSOR    60 tablet    Take 50 mg (half tablet) in the morning and 100 mg (1 full tablet) in the evening.    Benign essential hypertension       order for DME     1 Device    Equipment being ordered: FRANCISCO JAVIER stockings    Orthostasis       polyethylene glycol powder    MIRALAX/GLYCOLAX    510 g    Take 17 g by mouth daily    Constipation, unspecified constipation type

## 2017-10-25 NOTE — NURSING NOTE
"10/24/2017  \"Sorry for the delay. No, he does not need to come in.     Ilsa Wayne, DO \"  Per provider [facility] patient does not need to come in.  Shirley mo, cma   "

## 2017-11-08 ENCOUNTER — MEDICAL CORRESPONDENCE (OUTPATIENT)
Dept: HEALTH INFORMATION MANAGEMENT | Facility: CLINIC | Age: 82
End: 2017-11-08

## 2017-11-20 ENCOUNTER — OFFICE VISIT (OUTPATIENT)
Dept: FAMILY MEDICINE | Facility: CLINIC | Age: 82
End: 2017-11-20

## 2017-11-20 VITALS
DIASTOLIC BLOOD PRESSURE: 77 MMHG | BODY MASS INDEX: 24.28 KG/M2 | HEIGHT: 68 IN | OXYGEN SATURATION: 100 % | SYSTOLIC BLOOD PRESSURE: 142 MMHG | HEART RATE: 80 BPM | TEMPERATURE: 97.2 F | WEIGHT: 160.2 LBS | RESPIRATION RATE: 18 BRPM

## 2017-11-20 DIAGNOSIS — R94.4 DECREASED GFR: ICD-10-CM

## 2017-11-20 DIAGNOSIS — N18.30 STAGE 3 CHRONIC KIDNEY DISEASE (H): ICD-10-CM

## 2017-11-20 DIAGNOSIS — R26.89 BALANCE PROBLEMS: ICD-10-CM

## 2017-11-20 DIAGNOSIS — J30.89 CHRONIC NON-SEASONAL ALLERGIC RHINITIS, UNSPECIFIED TRIGGER: ICD-10-CM

## 2017-11-20 DIAGNOSIS — I10 BENIGN ESSENTIAL HYPERTENSION: Primary | ICD-10-CM

## 2017-11-20 PROBLEM — N31.2 FLACCID NEUROPATHIC BLADDER, NOT ELSEWHERE CLASSIFIED: Status: ACTIVE | Noted: 2017-11-20

## 2017-11-20 LAB
BUN SERPL-MCNC: 27 MG/DL (ref 7–30)
CALCIUM SERPL-MCNC: 9.8 MG/DL (ref 8.5–10.4)
CHLORIDE SERPLBLD-SCNC: 99 MMOL/L (ref 94–109)
CO2 SERPL-SCNC: 27 MMOL/L (ref 20–32)
CREAT SERPL-MCNC: 1.6 MG/DL (ref 0.8–1.5)
EGFR CALCULATED (BLACK REFERENCE): 53.6 ML/MIN
EGFR CALCULATED (NON BLACK REFERENCE): 44.3 ML/MIN
GLUCOSE SERPL-MCNC: 101 MG/DL (ref 60–109)
POTASSIUM SERPL-SCNC: 4.8 MMOL/L (ref 3.4–5.3)
SODIUM SERPL-SCNC: 139 MMOL/L (ref 133–144)

## 2017-11-20 RX ORDER — FLUTICASONE PROPIONATE 50 MCG
1-2 SPRAY, SUSPENSION (ML) NASAL DAILY
Qty: 1 BOTTLE | Refills: 11 | Status: SHIPPED | OUTPATIENT
Start: 2017-11-20 | End: 2018-01-02

## 2017-11-20 NOTE — PROGRESS NOTES
Preceptor Attestation:  Patient's case reviewed and discussed with Ilsa Wayne, DO Patient seen and discussed with the resident.. I agree with assessment and plan of care.  Supervising Physician:  Randy Cavazos MD  [facility]

## 2017-11-20 NOTE — LETTER
November 22, 2017      Kevin Saldana  [address]  [place] [address]        Dear Kevin,    Your electrolytes (potassium, sodium, chloride, and calcium levels) are all normal. Your creatinine is continuing to increase however. I feel strongly that we should investigate further why your kidney function is decreasing.     I have ordered a renal ultrasound. Montserrat from our office will be calling to help arrange this so that is is completed before our next office visit scheduled January 2nd.     I have also ordered several lab tests. You can either stop in for a lab only visit to complete these tests or we can run them at your next appointment. It would be beneficial to have the lab results back so that we can talk about what we think is going on. It might also be nice to get these tests doen before the new year for insurance purposes. However, if it is too difficult to get to clinic before your next appointment, that is okay.     Please see below for your test results.    Resulted Orders   Basic Metabolic Panel ([facility]) - Results < 1 hr   Result Value Ref Range    Glucose 101.0 60.0 - 109.0 mg/dL    Urea Nitrogen 27.0 7.0 - 30.0 mg/dL    Creatinine 1.6 (H) 0.8 - 1.5 mg/dL    Sodium 139.0 133.0 - 144.0 mmol/L    Potassium 4.8 3.4 - 5.3 mmol/L    Chloride 99.0 94.0 - 109.0 mmol/L    Carbon Dioxide 27.0 20.0 - 32.0 mmol/L    Calcium 9.8 8.5 - 10.4 mg/dL    eGFR Calculated (Non Black Reference) 44.3 (L) >60.0 mL/min    eGFR Calculated (Black Reference) 53.6 (L) >60.0 mL/min       If you have any questions, please call the clinic to make an appointment.    Sincerely,    Ilsa Wayne, DO

## 2017-11-20 NOTE — MR AVS SNAPSHOT
After Visit Summary   2017    Kevin Saldana    MRN: [id number]           Patient Information     Date Of Birth          1935        Visit Information        Provider Department      2017 1:20 PM Ilsa Wayne, DO [facility]        Today's Diagnoses     Benign essential hypertension    -  1    Chronic non-seasonal allergic rhinitis, unspecified trigger        Decreased GFR        Dizziness           Follow-ups after your visit        Who to contact     Please call your clinic at [phone] to:    Ask questions about your health    Make or cancel appointments    Discuss your medicines    Learn about your test results    Speak to your doctor   If you have compliments or concerns about an experience at your clinic, or if you wish to file a complaint, please contact [facility] Patient Relations at [phone] or email us at [email or web address]         Additional Information About Your Visit        MyChart Information     [employer] is an electronic gateway that provides easy, online access to your medical records. With [employer], you can request a clinic appointment, read your test results, renew a prescription or communicate with your care team.     To sign up for [employer] visit the website at [email or web address]   You will be asked to enter the access code listed below, as well as some personal information. Please follow the directions to create your username and password.     Your access code is: [email or web address]  Expires: 2017  9:09 AM     Your access code will  in 90 days. If you need help or a new code, please contact your [facility] or call [phone] for assistance.        Care EveryWhere ID     This is your Care EveryWhere ID. This could be used by other organizations to access your [facility] medical records  [id number]        Your Vitals Were     Pulse Temperature Respirations  "Height Pulse Oximetry BMI (Body Mass Index)    80 97.2  F (36.2  C) (Oral) 18 5' 8\" (172.7 cm) 100% 24.36 kg/m2       Blood Pressure from Last 3 Encounters:   11/20/17 142/77   10/18/17 145/69   09/19/17 129/73    Weight from Last 3 Encounters:   11/20/17 160 lb 3.2 oz (72.7 kg)   10/18/17 162 lb (73.5 kg)   09/19/17 158 lb (71.7 kg)              We Performed the Following     Basic Metabolic Panel ([facility]) - Results < 1 hr          Where to get your medicines      These medications were sent to [id number] - [place], [place] - [address] AT [facility] & [address], [place] [address]     Phone:  [phone]     fluticasone 50 MCG/ACT spray          Primary Care Provider Office Phone # Fax #    Ilsa Rachel Wayne [phone]       [facility] [address] [facility] [address]        Equal Access to Services     OSITO FRASER AH: Hadii jah ku hadasho Soomaali, waaxda luqadaha, qaybta kaalmada adeegyada, siomara kelsey . So [facility] [phone].    ATENCIÓN: Si habla español, tiene a nguyen disposición servicios gratuitos de asistencia lingüística. [facility] [phone].    We comply with applicable federal civil rights laws and [place] laws. We do not discriminate on the basis of race, color, national origin, age, disability, sex, sexual orientation, or gender identity.            Thank you!     Thank you for choosing [facility]  for your care. Our goal is always to provide you with excellent care. Hearing back from our patients is one way we can continue to improve our services. Please take a few minutes to complete the written survey that you may receive in the mail after your visit with us. Thank you!             Your Updated Medication List - Protect others around you: Learn how to safely use, store and throw away your medicines at www.[employer].org.          This list is accurate as of: " 11/20/17  1:52 PM.  Always use your most recent med list.                   Brand Name Dispense Instructions for use Diagnosis    fluticasone 50 MCG/ACT spray    FLONASE    1 Bottle    Spray 1-2 sprays into both nostrils daily    Chronic non-seasonal allergic rhinitis, unspecified trigger       hydrochlorothiazide 25 MG tablet    HYDRODIURIL    30 tablet    Take 1 tablet (25 mg) by mouth daily    Benign essential hypertension       lisinopril 10 MG tablet    PRINIVIL/ZESTRIL    90 tablet    Take 1 tablet (10 mg) by mouth daily    Benign essential hypertension       metoprolol 100 MG tablet    LOPRESSOR    60 tablet    Take 50 mg (half tablet) in the morning and 100 mg (1 full tablet) in the evening.    Benign essential hypertension       order for DME     1 Device    Equipment being ordered: FRANCISCO JAVIER stockings    Orthostasis       polyethylene glycol powder    MIRALAX/GLYCOLAX    510 g    Take 17 g by mouth daily    Constipation, unspecified constipation type

## 2017-11-20 NOTE — PROGRESS NOTES
[facility] - Family Medicine    Kevin Saldana is a 82 year old  male with past medical history significant of HTN, balance problems not otherwise specified, and BPH s/p TURP presenting for chief complaint of:     RECHECK: BP Check    Refill Request: Fluticasone - pended order    Medication Reconciliation: Completed!      SUBJECTIVE:        HPI:    HYPERTENSION:   - BP is well controlled  - is taking medications  - denies dizziness, lightheadedness, fainting, or falls   - medications: HCTZ 25 mg, lisinopril 10 mg po daily, and metoprolol 50 mg Qam and 100 mg Qpm    BALANCE PROBLEMS:   - Has improved with decreased dose of metoprolol  - Still has some symptoms with exertion   - Using cane to ambulate, tolerating this  - Denies lightheadedness with sitting or standing from laying position, denies onset with movement of head, not present at rest. Doesn't feel like he is going to fall.     MEDICATION REFILL:   - Out of Flonase. Finds this helpful. States he has rhinitis year round, but worst in the spring.     HEALTH MAINTENANCE:   - PCS completed fall risk questionnaire, passed.     History provided by Kevin  Interpretor: none    ROS:   CONSTITUTIONAL: no light headedness, headache or confusion  HEENT: no hearing problems, vision problems  HEART: no palpitations, no chest pain, no lightheadedness, no orthostasis   LUNGS: no cough, no SOB, no wheeze  GI: no diarrhea, no constipation  DERM: denies any scaly patches of skin     HISTORY:     Patient Active Problem List   Diagnosis     Health Care Home     S/P TURP     AK (actinic keratosis)     Benign essential hypertension     Balance problems     Decreased GFR       Past Surgical History:   Procedure Laterality Date     TURP          Allergies   Allergen Reactions     Nkda [No Known Drug Allergies]        Current Outpatient Prescriptions   Medication Sig Dispense Refill     fluticasone (FLONASE) 50 MCG/ACT spray Spray 1-2 sprays into both nostrils daily 1  "Bottle 11     lisinopril (PRINIVIL/ZESTRIL) 10 MG tablet Take 1 tablet (10 mg) by mouth daily 90 tablet 3     polyethylene glycol (MIRALAX/GLYCOLAX) powder Take 17 g by mouth daily 510 g 11     metoprolol (LOPRESSOR) 100 MG tablet Take 50 mg (half tablet) in the morning and 100 mg (1 full tablet) in the evening. 60 tablet 11     hydrochlorothiazide (HYDRODIURIL) 25 MG tablet Take 1 tablet (25 mg) by mouth daily 30 tablet 11     order for DME Equipment being ordered: FRANCISCO JAVIER stockings 1 Device 0       Social History     Social History     Marital status:      Spouse name: N/A     Number of children: N/A     Years of education: N/A     Occupational History     Not on file.     Social History Main Topics     Smoking status: Former Smoker     Smokeless tobacco: Never Used      Comment: quit 40 years prior     Alcohol use 0.0 oz/week     0 Standard drinks or equivalent per week      Comment: 1 Beer once in a while     Drug use: No     Sexual activity: No     Other Topics Concern     Not on file     Social History Narrative       OBJECTIVE:      PHYSICAL EXAM:   /77  Pulse 80  Temp 97.2  F (36.2  C) (Oral)  Resp 18  Ht 5' 8\" (172.7 cm)  Wt 160 lb 3.2 oz (72.7 kg)  SpO2 100%  BMI 24.36 kg/m2    GENERAL: well appearing elderly white male, appears stated age, talkative, no acute distress   HEENT: tympanic membrane grey, non-bulging non-erythematous, PERRL, extraocular movements movements intact. Septum midline, pharynx nonerythematous  HEART: Normal S1/S2, RRR, no murmurs  LUNGS: normal breath sounds, clear to auscultation  GI: nondiffuse, nontender, normal bowel sounds  PSYCH:  Normal affect    LABS:  Last Basic Metabolic Panel:  Lab Results   Component Value Date    .0 11/20/2017      Lab Results   Component Value Date    POTASSIUM 4.8 11/20/2017     Lab Results   Component Value Date    CHLORIDE 99.0 11/20/2017     Lab Results   Component Value Date    MACIEL 9.8 11/20/2017     Lab Results   Component " Value Date    CO2 27.0 2017     Lab Results   Component Value Date    BUN 27.0 2017     Lab Results   Component Value Date    CR 1.6 2017     Lab Results   Component Value Date    .0 2017     eGFR 44.3    ASSESSMENT & PLAN:      Kevin Saldana is a 82 year old  male who presented for the followin. Benign essential hypertension  BP is at goal. SBP < 150 and DBP < 90.     Continue current medication regimen: lisinopril 10 mg po daily, HCTZ 25 mg po daily, and.  metoprolol 50 mg Qam and 100 mg Qpm    BMP WNL after dose adjustment last visit.     2. Balance problems  eKvin denies orthostasis, vertigo, focal neurological symptoms, or tachycardia. Symptoms are nonspecific. He has noted improvement with decreased metoprolol dose.     Encouraged continued use of cane for ambulation.     Declines PT    Passed screen for fall risk assessment.     Next visit: May this represent some form of episodic ataxia? Symptoms only present with exertion and are difficult to quantify. Consider trial of ambulation at next clinic visit until symptoms develop and then perform detailed neurological exam.     3. Chronic non-seasonal allergic rhinitis, unspecified trigger  Chronic medication. Requests refill today. States improvement with medication.     fluticasone (FLONASE) 50 MCG/ACT spray; Spray 1-2 sprays into both nostrils daily  Dispense: 1 Bottle; Refill: 11    4. Decreased GFR  5. Stage 3 chronic kidney disease  eGFR has been trending downwards since 2016. eGFR < 60 in 2017. Subsequent check in 2017 again < 60. We started lisinopril 10 mg po daily at that visit so anticipate up to a 25% decrease. eGFR today decreased from 51.7 to 44.3. This is a decrease of 14%, so within expected change from starting lisinopril.     With > 3 months eGFR < 60, does meet diagnosis for CKD3.     Next visit: will work up for underlying etiology. May be secondary to hypertension, but eGFR is decreasing  faster than I would anticipate. I am worried about possibility of underlying malignancy so will screen with renal US and SPEP/UPEP. Assess for hematuria and proteinuria. Assess for comorbid DM. Assess for complications with vitamin D level, phosphorus, PTH, albumin, and alkaline phosphatase.      Parathyroid Horm.Intact ([facility]); Future    Microalbumin Creatinine Ratio Random Ur ([facility]); Future    Urinalysis, Micro If ([facility] ); Future    Phosphorus ([facility]); Future    Lipid Panel ([facility]); Future    Hemoglobin A1c ([facility]); Future    Vitamin D 25-Hydroxy ([facility]); Future    CBC with Plt (P ); Future    US Renal Complete w Doppler Complete; Future    ELP Prot Serum [SPEP] [place] ([facility]); Future    Elp Prot Ur Random [UPEP] [place] ([facility]); Future    [facility] - results <1hr Protocol; Future    Health Maintenance: Passed fall risk assessment questions. Next visit, will discuss advanced directive.     RTC: 1 mo    Options for treatment and follow-up care were reviewed with the patient and/or guardian. Kevin Saldana and/or guardian engaged in the decision making process and verbalized understanding of the options discussed and agreed with the final plan.    This note was scribed by Jerald Dorsey, MS3, on behalf of Dr. Ilsa Wayne DO    Precepted today with: Randy Cavazos MD    In supervising the medical student, I saw and evaluated the patient with the student and personally performed all aspects of the history and physical.  I have reviewed and verified that the documentation is correct and complete. I have edited note above to reflect my interpretation of the visit and medical decision making.     Ilsa Wayne DO   [facility] Family Medicine Resident, PGY-3

## 2017-11-21 ENCOUNTER — TELEPHONE (OUTPATIENT)
Dept: FAMILY MEDICINE | Facility: CLINIC | Age: 82
End: 2017-11-21

## 2017-11-21 ENCOUNTER — RECORDS - HEALTHEAST (OUTPATIENT)
Dept: ADMINISTRATIVE | Facility: OTHER | Age: 82
End: 2017-11-21

## 2017-11-21 NOTE — PATIENT INSTRUCTIONS
Referral for ( TEST )  :      US Renal Complete w Doppler   LOCATION/PLACE/Provider :    [facility]  DATE & TIME :     12-5-2017 at 2:00  PHONE :     [phone]  FAX :     [phone]  ADDITIONAL INFORMATION :     NPO 4 hours before US  Appointment made by clinic staff/:    Montserrat

## 2017-11-21 NOTE — PROGRESS NOTES
Please call Kevin regarding his lab results.     Orquidea Brown,     Thank you so much for coming in the [facility]. It was very nice to see you again.     We are calling in regards to your lab results. Your electrolytes (potassium, sodium, chloride, and calcium levels) are all normal. Your creatinine is continuing to increase however. I feel strongly that we should investigate further why your kidney function is decreasing.     I have ordered a renal ultrasound. Montserrat from our office will be calling to help arrange this so that is is completed before our next office visit scheduled January 2nd.     I have also ordered several lab tests. You can either stop in for a lab only visit to complete these tests or we can run them at your next appointment. It would be beneficial to have the lab results back so that we can talk about what we think is going on. It might also be nice to get these tests doen before the new year for insurance purposes. However, if it is too difficult to get to clinic before your next appointment, that is okay.     Please let me know if you have any questions.     Sincerely,     Ilsa Wayne, DO

## 2017-11-21 NOTE — ASSESSMENT & PLAN NOTE
Kevin denies orthostasis, vertigo, focal neurological symptoms, or tachycardia. Symptoms are nonspecific. He has noted improvement with decreased metoprolol dose.     Encouraged continued use of cane for ambulation.     Declines PT    Passed screen for fall risk assessment.     Next visit: May this represent some form of episodic ataxia? Symptoms only present with exertion and are difficult to quantify. Consider trial of ambulation at next clinic visit until symptoms develop and then perform detailed neurological exam.

## 2017-11-21 NOTE — ASSESSMENT & PLAN NOTE
eGFR has been trending downwards since April 2016. eGFR < 60 in June 2017. Subsequent check in July 2017 again < 60. We started lisinopril 10 mg po daily at that visit so anticipate up to a 25% decrease. eGFR today decreased from 51.7 to 44.3. This is a decrease of 14%, so within expected change from starting lisinopril.     With > 3 months eGFR < 60, does meet diagnosis for CKD3.     Next visit: will work up for underlying etiology. May be secondary to hypertension, but eGFR is decreasing faster than I would anticipate. I am worried about possibility of underlying malignancy so will screen with renal US and SPEP/UPEP. Assess for hematuria and proteinuria. Assess for comorbid DM. Assess for complications with vitamin D level, phosphorus, PTH, albumin, and alkaline phosphatase.      Parathyroid Horm.Intact ([facility]); Future    Microalbumin Creatinine Ratio Random Ur ([facility]); Future    Urinalysis, Micro If ([facility]); Future    Phosphorus ([facility]); Future    Lipid Panel ([facility]); Future    Hemoglobin A1c ([facility]); Future    Vitamin D 25-Hydroxy ([facility]); Future    CBC with Plt ([facility]); Future    US Renal Complete w Doppler Complete; Future    ELP Prot Serum [SPEP] [place] ([facility]); Future    Elp Prot Ur Random [UPEP] [place] ([facility]); Future    Comprehensive Metabolic Panel ([facility]) - results <1hr Protocol; Future

## 2017-11-21 NOTE — ASSESSMENT & PLAN NOTE
BP is at goal. SBP < 150 and DBP < 90.     Continue current medication regimen: lisinopril 10 mg po daily, HCTZ 25 mg po daily, and.  metoprolol 50 mg Qam and 100 mg Qpm    BMP WNL after dose adjustment last visit.

## 2017-11-21 NOTE — TELEPHONE ENCOUNTER
[facility] phone call message- general phone call:    Reason for call: Patient is calling back. Looks like Samy tried to reach patient. Notified patient I would have her call him back when available.     Return call needed: Yes    OK to leave a message on voice mail? Yes    Primary language: English      needed? No    Call taken on November 21, 2017 at 10:58 AM by Tiara Lai

## 2017-12-01 DIAGNOSIS — N18.30 STAGE 3 CHRONIC KIDNEY DISEASE (H): ICD-10-CM

## 2017-12-01 LAB
ALBUMIN SERPL-MCNC: 4.1 G/DL (ref 3.2–4.5)
ALP SERPL-CCNC: 71 U/L (ref 40–150)
ALT SERPL-CCNC: 24 U/L (ref 0–45)
AST SERPL-CCNC: 24 U/L (ref 0–55)
BILIRUB SERPL-MCNC: 0.6 MG/DL (ref 0.2–1.3)
BILIRUBIN UR: NEGATIVE
BLOOD UR: ABNORMAL
BUN SERPL-MCNC: 24 MG/DL (ref 7–30)
CALCIUM SERPL-MCNC: 9.5 MG/DL (ref 8.5–10.4)
CHLORIDE SERPLBLD-SCNC: 100 MMOL/L (ref 94–109)
CHOLEST SERPL-MCNC: 179 MG/DL
CHOLEST/HDLC SERPL: 3.8 RATIO
CLARITY, URINE: CLEAR
CO2 SERPL-SCNC: 27 MMOL/L (ref 20–32)
COLOR UR: YELLOW
CREAT SERPL-MCNC: 1.1 MG/DL (ref 0.8–1.5)
CREAT UR-MCNC: 153.1 MG/DL
EGFR CALCULATED (BLACK REFERENCE): 82.6 ML/MIN
EGFR CALCULATED (NON BLACK REFERENCE): 68.3 ML/MIN
GLUCOSE SERPL-MCNC: 102 MG/DL (ref 60–109)
GLUCOSE URINE: NEGATIVE
HBA1C MFR BLD: 5.7 % (ref 4.1–5.7)
HCT VFR BLD AUTO: 40.3 % (ref 40–53)
HDLC SERPL-MCNC: 47 MG/DL
HEMOGLOBIN: 12.5 G/DL (ref 13.3–17.7)
KETONES UR QL: NEGATIVE
LDLC SERPL CALC-MCNC: 105 MG/DL (ref 0–99)
LEUKOCYTE ESTERASE UR: ABNORMAL
MCH RBC QN AUTO: 30.3 PG (ref 26.5–35)
MCHC RBC AUTO-ENTMCNC: 31 G/DL (ref 32–36)
MCV RBC AUTO: 97.8 FL (ref 78–100)
MICROALBUMIN UR-MCNC: 16.65 MG/DL (ref 0–1.99)
MICROALBUMIN/CREAT UR: 108.8 MG/G
NITRITE UR QL STRIP: POSITIVE
PARATHYROID HORMONE: 58 PG/ML (ref 10–86)
PH UR STRIP: 6.5 [PH] (ref 5–7)
PHOSPHATE SERPL-MCNC: 3.2 MG/DL (ref 2.5–4.5)
PLATELET # BLD AUTO: 163 K/UL (ref 150–450)
POTASSIUM SERPL-SCNC: 4.3 MMOL/L (ref 3.4–5.3)
PROT SERPL-MCNC: 7.9 G/DL (ref 6.6–8.8)
PROTEIN UR: ABNORMAL
RBC # BLD AUTO: 4.12 M/UL (ref 4.4–5.9)
SODIUM SERPL-SCNC: 137 MMOL/L (ref 133–144)
SP GR UR STRIP: 1.02
TRIGL SERPL-MCNC: 136 MG/DL
UROBILINOGEN UR STRIP-ACNC: ABNORMAL
VLDL-CHOLESTEROL: 27 MG/DL (ref 7–32)
WBC # BLD AUTO: 5.9 K/UL (ref 4–11)

## 2017-12-04 LAB — 25(OH)D3 SERPL-MCNC: 34.9 NG/ML (ref 30–80)

## 2017-12-05 ENCOUNTER — HOSPITAL ENCOUNTER (OUTPATIENT)
Dept: ULTRASOUND IMAGING | Facility: HOSPITAL | Age: 82
Discharge: HOME | End: 2017-12-05
Attending: FAMILY MEDICINE

## 2017-12-05 DIAGNOSIS — N18.30 CRD (CHRONIC RENAL DISEASE), STAGE III (H): ICD-10-CM

## 2017-12-06 LAB
ALBUMIN MFR SERPL ELPH: 58.1 % (ref 51–67)
ALBUMIN SERPL-MCNC: 4.6 G/DL (ref 3.2–4.7)
ALPHA 1-%: 2.2 % (ref 2–4)
ALPHA 1: 0.2 G/DL (ref 0.1–0.3)
ALPHA 2-%: 11 % (ref 5–13)
ALPHA 2: 0.9 G/DL (ref 0.4–0.9)
B-GLOBULIN MFR SERPL ELPH: 12.1 % (ref 10–17)
B-GLOBULIN SERPL ELPH-MCNC: 1 G/DL (ref 0.7–1.2)
ELECTROPHORESIS COMMENTS: NORMAL
ELECTROPHORESIS COMMENTS: NORMAL
GAMMA GLOB MFR SERPL ELPH: 16.6 % (ref 9–20)
GAMMA GLOB SNV ELPH-MCNC: 1.3 G/DL (ref 0.6–1.4)
INTERPRETED BY: NORMAL
INTERPRETED BY: NORMAL
PATH ICD: NORMAL
PATH ICD: NORMAL
PROT SERPL-MCNC: 7.9 G/DL (ref 6–8)
PROTEIN URINE RANDOM: 44 MG/DL

## 2017-12-15 ENCOUNTER — TELEPHONE (OUTPATIENT)
Dept: FAMILY MEDICINE | Facility: CLINIC | Age: 82
End: 2017-12-15

## 2017-12-15 NOTE — TELEPHONE ENCOUNTER
----- Message from Ilsa Wayne, DO sent at 12/14/2017 10:15 PM CST -----  Can you check to see if Kevin ever went to his kidney ultrasound?     Thank you,     Ilsa Wayne, DO

## 2017-12-15 NOTE — TELEPHONE ENCOUNTER
Called and spoke to patient and verified if he attended his kidney U/S appointment and he mentioned he did.  Patient has an appointment on 01/2/18 @ 240pm with Dr. Wayne, will plan to discuss results then.  No other questions or concerns.

## 2017-12-15 NOTE — PROGRESS NOTES
Please call Kevin. I have reviewed all of his recent labs. Overall, everything looks good and is reassuring. We can review them in detail when he returns for his next appointment.   Thank you,   Ilsa Wayne, DO    ------------------------------------------    1. Workup for worsening renal function, faster than anticipated. Normal SPEP, UPEP, vitamin D, PTH, SPEP, UPEP, alk phos, and albumin. Creatinine has improved, eGFR > 60 again. Urine protein creatinine ratio is positive at 108, UA positive for blood and protein.   2. A1c 5.7, borderline prediabetic but is normal.   3. Lipid panel with ASCVD risk 40.7%.   4. Hemoglobin is 12.5, mildly decreased from 2015, normocytic.

## 2018-01-02 ENCOUNTER — OFFICE VISIT (OUTPATIENT)
Dept: FAMILY MEDICINE | Facility: CLINIC | Age: 83
End: 2018-01-02
Payer: COMMERCIAL

## 2018-01-02 VITALS
DIASTOLIC BLOOD PRESSURE: 68 MMHG | BODY MASS INDEX: 23.73 KG/M2 | HEART RATE: 98 BPM | HEIGHT: 68 IN | RESPIRATION RATE: 20 BRPM | OXYGEN SATURATION: 97 % | TEMPERATURE: 97.4 F | SYSTOLIC BLOOD PRESSURE: 126 MMHG | WEIGHT: 156.6 LBS

## 2018-01-02 DIAGNOSIS — R31.9 HEMATURIA, UNSPECIFIED TYPE: ICD-10-CM

## 2018-01-02 DIAGNOSIS — R80.9 POSITIVE FOR MICROALBUMINURIA: ICD-10-CM

## 2018-01-02 DIAGNOSIS — K59.00 CONSTIPATION, UNSPECIFIED CONSTIPATION TYPE: ICD-10-CM

## 2018-01-02 DIAGNOSIS — I10 BENIGN ESSENTIAL HYPERTENSION: Primary | ICD-10-CM

## 2018-01-02 DIAGNOSIS — J30.89 CHRONIC NON-SEASONAL ALLERGIC RHINITIS, UNSPECIFIED TRIGGER: ICD-10-CM

## 2018-01-02 LAB
BACTERIA: NORMAL
CASTS: NORMAL /LPF
CREAT UR-MCNC: 147.6 MG/DL
CRYSTAL URINE: NORMAL /LPF
EPITHELIAL CELLS UR: NORMAL /LPF (ref 0–2)
MICROALBUMIN UR-MCNC: 11.93 MG/DL (ref 0–1.99)
MICROALBUMIN/CREAT UR: 80.8 MG/G
MUCOUS URINE: NORMAL LPF
RBC URINE: NORMAL /HPF
WBC URINE: NORMAL /HPF

## 2018-01-02 RX ORDER — FLUTICASONE PROPIONATE 50 MCG
1-2 SPRAY, SUSPENSION (ML) NASAL DAILY
Qty: 3 BOTTLE | Refills: 3 | Status: SHIPPED | OUTPATIENT
Start: 2018-01-02 | End: 2018-11-23

## 2018-01-02 RX ORDER — POLYETHYLENE GLYCOL 3350 17 G/17G
17 POWDER, FOR SOLUTION ORAL DAILY
Qty: 510 G | Refills: 11 | Status: SHIPPED | OUTPATIENT
Start: 2018-01-02 | End: 2019-01-30

## 2018-01-02 RX ORDER — LISINOPRIL 10 MG/1
10 TABLET ORAL DAILY
Qty: 90 TABLET | Refills: 3 | Status: SHIPPED | OUTPATIENT
Start: 2018-01-02 | End: 2019-01-23

## 2018-01-02 RX ORDER — METOPROLOL TARTRATE 100 MG
TABLET ORAL
Qty: 135 TABLET | Refills: 3 | Status: SHIPPED | OUTPATIENT
Start: 2018-01-02 | End: 2018-08-13

## 2018-01-02 RX ORDER — HYDROCHLOROTHIAZIDE 25 MG/1
25 TABLET ORAL DAILY
Qty: 90 TABLET | Refills: 3 | Status: SHIPPED | OUTPATIENT
Start: 2018-01-02 | End: 2019-01-30

## 2018-01-02 NOTE — PATIENT INSTRUCTIONS
Thank you for coming to clinic.     As discussed, your lab work regarding your kidneys was normal. Your kidney ultrasound was also normal.    Keep taking your blood pressure medications as prescribed. I have renewed all your medications for the next year for 3 month supplies.     Come back in 1 month for a nurse only blood pressure check. Come back when you are able for a medicare annual wellness exam. Return for a blood pressure visit in 6 months.     Please let me know if you have any other questions or concerns.     Sincerely,   Ilsa Wayne, DO

## 2018-01-02 NOTE — PROGRESS NOTES
Preceptor Attestation:  Patient's case reviewed and discussed with Ilsa Wayne,  resident and I evaluated the patient. I agree with written assessment and plan of care.  Supervising Physician:  GILDA GOSS MD  [facility]

## 2018-01-02 NOTE — PROGRESS NOTES
"[facility] - Family Medicine    Kevin Saldana is a 82 year old  male with past medical history significant of CKD3, HTN, balance problems, s/p TURP, and neuropathic bladder presenting for chief complaint of:     Patient presents with:  RECHECK: BP and U/S. U/S report in care everywhere, offered advanced directive and wellness- Not in  Medication Reconciliation: completed      SUBJECTIVE:        HPI:    Is improving from recent illness, thinks flu or otherwise. Symptoms peaked last Saturday, now resolving.     HYPERTENSION -   Blood pressure is at goal, /68.   Medications: lisinopril 10 mg po daily, HCTZ 25 mg po daily, and metoprolol 50 mg Qam and 100 mg Qpm.     RENAL US -   Normal. Reviewed results with patient.     CKD3 -   Lab evaluation normal. Reviewed results with patient.     HEALTH MAINTENANCE -    Due for annual wellness visit.     History provided by Kevin  Interpretor: None    ROS:   CONSTITUTIONAL: Appetite improving, fevers improved.   HEART: Denies chest pain, palpitations, or lightheadedness. Still has episodes of \"balance issues\" but stable, not progressing.   LUNGS: Dry cough present. No wheeze or shortness of breath.   GI: Denies nausea, vomiting, or diarrhea     HISTORY:     Patient Active Problem List   Diagnosis     Health Care Home     S/P TURP     AK (actinic keratosis)     Benign essential hypertension     Balance problems     Stage 3 chronic kidney disease     Chronic non-seasonal allergic rhinitis, unspecified trigger     Flaccid neuropathic bladder, not elsewhere classified       Past Surgical History:   Procedure Laterality Date     TURP  2007       Allergies   Allergen Reactions     Nkda [No Known Drug Allergies]        Current Outpatient Prescriptions   Medication Sig Dispense Refill     fluticasone (FLONASE) 50 MCG/ACT spray Spray 1-2 sprays into both nostrils daily 1 Bottle 11     lisinopril (PRINIVIL/ZESTRIL) 10 MG tablet Take 1 tablet (10 mg) by mouth daily 90 " "tablet 3     polyethylene glycol (MIRALAX/GLYCOLAX) powder Take 17 g by mouth daily 510 g 11     metoprolol (LOPRESSOR) 100 MG tablet Take 50 mg (half tablet) in the morning and 100 mg (1 full tablet) in the evening. 60 tablet 11     hydrochlorothiazide (HYDRODIURIL) 25 MG tablet Take 1 tablet (25 mg) by mouth daily 30 tablet 11     order for DME Equipment being ordered: FRANCISCO JAVIER stockings 1 Device 0     OBJECTIVE:      PHYSICAL EXAM:  VITALS: /68  Pulse 98  Temp 97.4  F (36.3  C)  Resp 20  Ht 5' 8\" (172.7 cm)  Wt 156 lb 9.6 oz (71 kg)  SpO2 97%  BMI 23.81 kg/m2   GENERAL: Thin, alert, elderly male, no acute distress.   HEART: Regular rate and rhythm, no murmurs present   LUNGS: Clear to auscultation bilaterally, unlabored   : Leg bag in place.     LABS:  From previous visit:   1. Workup for worsening renal function, faster than anticipated. Normal vitamin D, PTH, SPEP, UPEP, alk phos, and albumin. Creatinine has improved, eGFR > 60 again. Urine protein creatinine ratio is positive at 108, UA positive for blood and protein.   2. A1c 5.7, borderline prediabetic but is normal.   3. Lipid panel with ASCVD risk 40.7%.   4. Hemoglobin is 12.5, mildly decreased from 2015, normocytic.    IMAGING:  US KIDNEY BILATERAL WITH BLADDER  12/5/2017 1:54 PM    INDICATION: Chronic kidney disease, stage 3 (moderate)  TECHNIQUE: Routine kidneys and bladder.  COMPARISON: None.    FINDINGS:  Right kidney measures 9.5 x 4.3 x 4.8 cm. No hydronephrosis or solid mass is 1.3 x 1.2 x 1.1 cm cyst off the medial cortex of the right kidney.    Left kidney measures 9.2 x 4.4 x 4.2 cm. No hydronephrosis. No mass. Normal renal echotexture.    Bladder is decompressed by Crump catheter and not well visualized.    CONCLUSION:  1.  1.3 cm cyst right kidney no hydronephrosis or solid mass on either side.    ASSESSMENT & PLAN:      Kevin Saldana is a 82 year old  male who presented for follow up labs and ultrasound.     1. Benign essential " hypertension  Blood pressure is at goal today, SBP < 140 and DBP < 90. Renewed medications for 1 year. Will return for nurse only visit in 1 month for blood pressure recheck, recommended physician BP follow up in 6 months.     Continue hydrochlorothiazide (HYDRODIURIL) 25 MG tablet; Take 1 tablet (25 mg) by mouth daily  Dispense: 90 tablet; Refill: 3    Continue lisinopril (PRINIVIL/ZESTRIL) 10 MG tablet; Take 1 tablet (10 mg) by mouth daily  Dispense: 90 tablet; Refill: 3    Continue metoprolol (LOPRESSOR) 100 MG tablet; Take 50 mg (half tablet) in the morning and 100 mg (1 full tablet) in the evening.  Dispense: 135 tablet; Refill: 3    2. Positive for microalbuminuria without meeting diagnosis criteria  Microalbumin was positive in December. Will recheck today.   - Microalbumin Creatinine Ratio Random Ur ([facility])    3. Hematuria, unspecified type  Hematuria positive last visit. Will recheck today.   - Urine Microscopic ([facility]) - Negative for hematuria     4. Chronic non-seasonal allergic rhinitis, unspecified trigger  Renewed all prescriptions today. Did not discuss rhinitis.   - fluticasone (FLONASE) 50 MCG/ACT spray; Spray 1-2 sprays into both nostrils daily  Dispense: 3 Bottle; Refill: 3    5. Constipation, unspecified constipation type  Renewed all prescriptions today. Did not discuss constipation.   - polyethylene glycol (MIRALAX/GLYCOLAX) powder; Take 17 g by mouth daily  Dispense: 510 g; Refill: 11    Health Maintenance: Due for annual wellness visit. Will discuss advanced directive at that appointment.      RTC: Recheck blood pressure in 6 months. Will discuss initiating statin to decrease risk of heart attack or stroke given ASCVD risk.     Options for treatment and follow-up care were reviewed with the patient and/or guardian. Kevin Saldana and/or guardian engaged in the decision making process and verbalized understanding of the options discussed and agreed with the final plan.    Precepted today  with: Dr. Lavonne Wayne DO   [facility] Resident, PGY-3

## 2018-01-02 NOTE — MR AVS SNAPSHOT
"              After Visit Summary   1/2/2018    Kevin Saldana    MRN: [id number]           Patient Information     Date Of Birth          1935        Visit Information        Provider Department      1/2/2018 2:20 PM Ilsa Wayne, DO [facility]        Today's Diagnoses     Benign essential hypertension    -  1    Positive for microalbuminuria without meeting diagnosis criteria        Chronic non-seasonal allergic rhinitis, unspecified trigger        Constipation, unspecified constipation type          Care Instructions    Thank you for coming to clinic.     As discussed, your lab work regarding your kidneys was normal. Your kidney ultrasound was also normal.    Keep taking your blood pressure medications as prescribed. I have renewed all your medications for the next year for 3 month supplies.     Come back in 1 month for a nurse only blood pressure check. Come back when you are able for a medicare annual wellness exam. Return for a blood pressure visit in 6 months.     Please let me know if you have any other questions or concerns.     Sincerely,   Ilsa Wayne, DO          Follow-ups after your visit        Who to contact     Please call your clinic at [phone] to:    Ask questions about your health    Make or cancel appointments    Discuss your medicines    Learn about your test results    Speak to your doctor   If you have compliments or concerns about an experience at your clinic, or if you wish to file a complaint, please contact [facility] Patient Relations at [phone] or email us at [email or web address]         Additional Information About Your Visit        Care EveryWhere ID     This is your Care EveryWhere ID. This could be used by other organizations to access your [place] medical records  [id number]        Your Vitals Were     Pulse Temperature Respirations Height Pulse Oximetry BMI (Body Mass Index)    98 97.4  F (36.3  C) 20 5' 8\" " (172.7 cm) 97% 23.81 kg/m2       Blood Pressure from Last 3 Encounters:   01/02/18 126/68   11/20/17 142/77   10/18/17 145/69    Weight from Last 3 Encounters:   01/02/18 156 lb 9.6 oz (71 kg)   11/20/17 160 lb 3.2 oz (72.7 kg)   10/18/17 162 lb (73.5 kg)              We Performed the Following     Microalbumin Creatinine Ratio Random Ur ([facility])          Where to get your medicines      These medications were sent to [id number] - [place], [place] - [address] AT [facility] & [facility]  [address], [place] [address]     Phone:  [phone]     fluticasone 50 MCG/ACT spray    hydrochlorothiazide 25 MG tablet    lisinopril 10 MG tablet    metoprolol 100 MG tablet    polyethylene glycol powder          Primary Care Provider Office Phone # Fax #    Ilsa Ramos DO [facility] [phone]       [address] [place] [address]        Equal Access to Services     [facility]: Hadii aad ku hadasho Soomaali, waaxda luqadaha, qaybta kaalmada adeegyada, siomara kelsey . So [facility] [phone].    ATENCIÓN: Si habla español, tiene a nguyen disposición servicios gratuitos de asistencia lingüística. [facility] [phone].    We comply with applicable federal civil rights laws and [place] laws. We do not discriminate on the basis of race, color, national origin, age, disability, sex, sexual orientation, or gender identity.            Thank you!     Thank you for choosing [facility]  for your care. Our goal is always to provide you with excellent care. Hearing back from our patients is one way we can continue to improve our services. Please take a few minutes to complete the written survey that you may receive in the mail after your visit with us. Thank you!             Your Updated Medication List - Protect others around you: Learn how to safely use, store and throw away your medicines at  www.disposemymeds.org.          This list is accurate as of: 1/2/18  3:01 PM.  Always use your most recent med list.                   Brand Name Dispense Instructions for use Diagnosis    fluticasone 50 MCG/ACT spray    FLONASE    3 Bottle    Spray 1-2 sprays into both nostrils daily    Chronic non-seasonal allergic rhinitis, unspecified trigger       hydrochlorothiazide 25 MG tablet    HYDRODIURIL    90 tablet    Take 1 tablet (25 mg) by mouth daily    Benign essential hypertension       lisinopril 10 MG tablet    PRINIVIL/ZESTRIL    90 tablet    Take 1 tablet (10 mg) by mouth daily    Benign essential hypertension       metoprolol 100 MG tablet    LOPRESSOR    135 tablet    Take 50 mg (half tablet) in the morning and 100 mg (1 full tablet) in the evening.    Benign essential hypertension       order for DME     1 Device    Equipment being ordered: FRANCISCO JAVIER stockings    Orthostasis       polyethylene glycol powder    MIRALAX/GLYCOLAX    510 g    Take 17 g by mouth daily    Constipation, unspecified constipation type

## 2018-01-04 PROBLEM — R80.9 URINE TEST POSITIVE FOR MICROALBUMINURIA: Status: ACTIVE | Noted: 2018-01-04

## 2018-01-19 ENCOUNTER — ALLIED HEALTH/NURSE VISIT (OUTPATIENT)
Dept: FAMILY MEDICINE | Facility: CLINIC | Age: 83
End: 2018-01-19
Payer: COMMERCIAL

## 2018-01-19 VITALS
DIASTOLIC BLOOD PRESSURE: 72 MMHG | BODY MASS INDEX: 23.11 KG/M2 | SYSTOLIC BLOOD PRESSURE: 127 MMHG | HEIGHT: 68 IN | HEART RATE: 77 BPM | OXYGEN SATURATION: 99 % | TEMPERATURE: 96.5 F | WEIGHT: 152.5 LBS

## 2018-01-19 DIAGNOSIS — I10 BENIGN ESSENTIAL HYPERTENSION: Primary | ICD-10-CM

## 2018-01-19 NOTE — MR AVS SNAPSHOT
"              After Visit Summary   1/19/2018    Kevin Saldana    MRN: [id number]           Patient Information     Date Of Birth          1935        Visit Information        Provider Department      1/19/2018 11:00 AM Nurse, Rena Ump [facility]        Today's Diagnoses     Benign essential hypertension    -  1       Follow-ups after your visit        Your next 10 appointments already scheduled     Jan 19, 2018 11:00 AM CST   Nurse Visit with Rena [facility] Nurse   [facility] ([facility])    [address] [place] [address]   [phone]              Who to contact     Please call your clinic at [phone] to:    Ask questions about your health    Make or cancel appointments    Discuss your medicines    Learn about your test results    Speak to your doctor   If you have compliments or concerns about an experience at your clinic, or if you wish to file a complaint, please contact [facility] Patient Relations at [phone] or email us at [email or web address]         Additional Information About Your Visit        Care EveryWhere ID     This is your Care EveryWhere ID. This could be used by other organizations to access your [place] medical records  [id number]        Your Vitals Were     Pulse Temperature Height Pulse Oximetry BMI (Body Mass Index)       77 96.5  F (35.8  C) (Oral) 5' 8\" (172.7 cm) 99% 23.19 kg/m2        Blood Pressure from Last 3 Encounters:   01/19/18 127/72   01/02/18 126/68   11/20/17 142/77    Weight from Last 3 Encounters:   01/19/18 152 lb 8 oz (69.2 kg)   01/02/18 156 lb 9.6 oz (71 kg)   11/20/17 160 lb 3.2 oz (72.7 kg)              Today, you had the following     No orders found for display       Primary Care Provider Office Phone # Fax #    Ilsa Wayne [phone]       [facility] [address] [place] [address]        Equal Access to Services     [facility]: Hadii " jah Govea, wajohnda luqadaha, qaybta kaalbrannon chadwick, waxbright idiin haysoumyahenry condonjaxon hiroshobha laConradandria srini. So [facility] [phone].    ATENCIÓN: Si habla español, tiene a nguyen disposición servicios gratuitos de asistencia lingüística. Yelenaame al [phone].    We comply with applicable federal civil rights laws and [place] laws. We do not discriminate on the basis of race, color, national origin, age, disability, sex, sexual orientation, or gender identity.            Thank you!     Thank you for choosing [facility]  for your care. Our goal is always to provide you with excellent care. Hearing back from our patients is one way we can continue to improve our services. Please take a few minutes to complete the written survey that you may receive in the mail after your visit with us. Thank you!             Your Updated Medication List - Protect others around you: Learn how to safely use, store and throw away your medicines at www.disposemymeds.org.          This list is accurate as of: 1/19/18 10:45 AM.  Always use your most recent med list.                   Brand Name Dispense Instructions for use Diagnosis    fluticasone 50 MCG/ACT spray    FLONASE    3 Bottle    Spray 1-2 sprays into both nostrils daily    Chronic non-seasonal allergic rhinitis, unspecified trigger       hydrochlorothiazide 25 MG tablet    HYDRODIURIL    90 tablet    Take 1 tablet (25 mg) by mouth daily    Benign essential hypertension       lisinopril 10 MG tablet    PRINIVIL/ZESTRIL    90 tablet    Take 1 tablet (10 mg) by mouth daily    Benign essential hypertension       metoprolol tartrate 100 MG tablet    LOPRESSOR    135 tablet    Take 50 mg (half tablet) in the morning and 100 mg (1 full tablet) in the evening.    Benign essential hypertension       order for DME     1 Device    Equipment being ordered: FRANCISCO JAVIER stockings    Orthostasis       polyethylene glycol powder    MIRALAX/GLYCOLAX    510 g    Take 17 g by mouth daily     Constipation, unspecified constipation type

## 2018-03-29 ENCOUNTER — ALLIED HEALTH/NURSE VISIT (OUTPATIENT)
Dept: FAMILY MEDICINE | Facility: CLINIC | Age: 83
End: 2018-03-29

## 2018-03-29 VITALS
DIASTOLIC BLOOD PRESSURE: 78 MMHG | OXYGEN SATURATION: 99 % | SYSTOLIC BLOOD PRESSURE: 152 MMHG | TEMPERATURE: 97.5 F | WEIGHT: 153.8 LBS | HEART RATE: 67 BPM | BODY MASS INDEX: 23.31 KG/M2 | RESPIRATION RATE: 16 BRPM | HEIGHT: 68 IN

## 2018-03-29 DIAGNOSIS — Z01.30 BP CHECK: Primary | ICD-10-CM

## 2018-04-26 ENCOUNTER — OFFICE VISIT (OUTPATIENT)
Dept: FAMILY MEDICINE | Facility: CLINIC | Age: 83
End: 2018-04-26

## 2018-04-26 VITALS
TEMPERATURE: 97.5 F | SYSTOLIC BLOOD PRESSURE: 128 MMHG | WEIGHT: 157.4 LBS | BODY MASS INDEX: 23.86 KG/M2 | OXYGEN SATURATION: 99 % | DIASTOLIC BLOOD PRESSURE: 74 MMHG | HEART RATE: 65 BPM | HEIGHT: 68 IN

## 2018-04-26 DIAGNOSIS — I10 BENIGN ESSENTIAL HYPERTENSION: Primary | ICD-10-CM

## 2018-04-26 NOTE — MR AVS SNAPSHOT
"              After Visit Summary   4/26/2018    Kevin Saldana    MRN: [id number]           Patient Information     Date Of Birth          1935        Visit Information        Provider Department      4/26/2018 9:40 AM Ilsa Wayne DO [facility]        Today's Diagnoses     Benign essential hypertension    -  1       Follow-ups after your visit        Follow-up notes from your care team     Return in about 1 month (around 5/26/2018) for annual wellness visit .      Your next 10 appointments already scheduled     Jun 05, 2018  9:40 AM CDT   65+ Annual Wellness with [facility]   [facility] ([facility])    [address] [place] [address]   [phone]            Jun 05, 2018 10:00 AM CDT   65+ Annual Wellness with Ilsa Wayne DO   [facility] ([facility])    [address] [place] [address]   [phone]              Who to contact     Please call your clinic at [phone] to:    Ask questions about your health    Make or cancel appointments    Discuss your medicines    Learn about your test results    Speak to your doctor            Additional Information About Your Visit        Care EveryWhere ID     This is your Care EveryWhere ID. This could be used by other organizations to access your [place] medical records  [id number]        Your Vitals Were     Pulse Temperature Height Pulse Oximetry BMI (Body Mass Index)       65 97.5  F (36.4  C) (Oral) 5' 8\" (172.7 cm) 99% 23.93 kg/m2        Blood Pressure from Last 3 Encounters:   04/26/18 128/74   03/29/18 152/78   01/19/18 127/72    Weight from Last 3 Encounters:   04/26/18 157 lb 6.4 oz (71.4 kg)   03/29/18 153 lb 12.8 oz (69.8 kg)   01/19/18 152 lb 8 oz (69.2 kg)              Today, you had the following     No orders found for display       Primary Care Provider Office Phone # Fax #    Ilsa Rachel Wayne [phone]       [facility] [address] [place] [address]        Equal Access to Services     [facility]: Hadii aad ku hadpaolaleonie Sobennyali, waaxda luqadaha, qaybta kaalmasiomara mason. So [facility] [phone].    ATENCIÓN: Si habla español, tiene a nguyen disposición servicios gratuitos de asistencia lingüística. [facility] [phone].    We comply with applicable federal civil rights laws and [place] laws. We do not discriminate on the basis of race, color, national origin, age, disability, sex, sexual orientation, or gender identity.            Thank you!     Thank you for choosing [facility]  for your care. Our goal is always to provide you with excellent care. Hearing back from our patients is one way we can continue to improve our services. Please take a few minutes to complete the written survey that you may receive in the mail after your visit with us. Thank you!             Your Updated Medication List - Protect others around you: Learn how to safely use, store and throw away your medicines at www.disposemymeds.org.          This list is accurate as of 4/26/18 11:59 PM.  Always use your most recent med list.                   Brand Name Dispense Instructions for use Diagnosis    fluticasone 50 MCG/ACT spray    FLONASE    3 Bottle    Spray 1-2 sprays into both nostrils daily    Chronic non-seasonal allergic rhinitis, unspecified trigger       hydrochlorothiazide 25 MG tablet    HYDRODIURIL    90 tablet    Take 1 tablet (25 mg) by mouth daily    Benign essential hypertension       lisinopril 10 MG tablet    PRINIVIL/ZESTRIL    90 tablet    Take 1 tablet (10 mg) by mouth daily    Benign essential hypertension       metoprolol tartrate 100 MG tablet    LOPRESSOR    135 tablet    Take 50 mg (half tablet) in the morning and 100 mg (1 full tablet) in the evening.    Benign essential hypertension       order for DME     1 Device    Equipment being ordered: FRANCISCO JAVIER stockings    Orthostasis       polyethylene  glycol powder    MIRALAX/GLYCOLAX    510 g    Take 17 g by mouth daily    Constipation, unspecified constipation type

## 2018-04-26 NOTE — PROGRESS NOTES
Preceptor Attestation:   Patient seen, evaluated and discussed with the resident, Dr. Ilsa Wayne. I have verified the content of the note, which accurately reflects my assessment of the patient and the plan of care.  Supervising Physician:Diane Hayes MD  [facility]

## 2018-04-26 NOTE — PROGRESS NOTES
"[facility] - Family Medicine    Kevin Saldana is a 83 year old  male with past medical history significant of HTN and CKD3 presenting for chief complaint of:     Patient presents with:  Hypertension: follow-up      SUBJECTIVE:        HPI:    HYPERTENSION -   Here for blood pressure check today. Blood pressure is at goal. Does not need any refills.     History provided by Kevin.     ROS:   HEART: No lightheadedness, no dizziness, no chest pain, no lower extremity edema.      HISTORY:     Patient Active Problem List   Diagnosis     Health Care Home     S/P TURP     AK (actinic keratosis)     Benign essential hypertension     Balance problems     Stage 3 chronic kidney disease     Chronic non-seasonal allergic rhinitis, unspecified trigger     Flaccid neuropathic bladder, not elsewhere classified     Urine test positive for microalbuminuria - has not yet met time criteria for diagnosis          Past Medical History:   Diagnosis Date     AK (actinic keratosis) 10/7/2015     Benign essential hypertension 10/7/2015     H/O removal of neck cyst 3/30/2016       Past Surgical History:   Procedure Laterality Date     TURP  2007          Allergies   Allergen Reactions     Nkda [No Known Drug Allergies]        Current Outpatient Prescriptions   Medication     fluticasone (FLONASE) 50 MCG/ACT spray     hydrochlorothiazide (HYDRODIURIL) 25 MG tablet     lisinopril (PRINIVIL/ZESTRIL) 10 MG tablet     metoprolol (LOPRESSOR) 100 MG tablet     order for DME     polyethylene glycol (MIRALAX/GLYCOLAX) powder     No current facility-administered medications for this visit.        OBJECTIVE:      PHYSICAL EXAM:  VITALS: /74  Pulse 65  Temp 97.5  F (36.4  C) (Oral)  Ht 5' 8\" (172.7 cm)  Wt 157 lb 6.4 oz (71.4 kg)  SpO2 99%  BMI 23.93 kg/m2  GENERAL: Kevin is a pleasant elderly white male, in no distress.   HEART: Regular rate and rhythm, no murmurs.   LUNGS: Clear to auscultation bilaterally, unlabored. "       ASSESSMENT & PLAN:      Kevin Saldana is a 83 year old  male who presented for blood pressure follow up.     1. Benign essential hypertension  Blood pressure is at goal and stable. Asymptomatic. Continue current regimen.     RTC: 1 month - schedule wellness visit.      Options for treatment and follow-up care were reviewed with the patient and/or guardian. Kevin Saldana and/or guardian engaged in the decision making process and verbalized understanding of the options discussed and agreed with the final plan.    Precepted today with: Dr. Diane Wayne,    [facility] Resident, PGY-3

## 2018-05-01 ENCOUNTER — MEDICAL CORRESPONDENCE (OUTPATIENT)
Dept: HEALTH INFORMATION MANAGEMENT | Facility: CLINIC | Age: 83
End: 2018-05-01

## 2018-06-05 ENCOUNTER — OFFICE VISIT (OUTPATIENT)
Dept: FAMILY MEDICINE | Facility: CLINIC | Age: 83
End: 2018-06-05

## 2018-06-05 VITALS
BODY MASS INDEX: 23.61 KG/M2 | TEMPERATURE: 97.6 F | SYSTOLIC BLOOD PRESSURE: 131 MMHG | WEIGHT: 155.8 LBS | DIASTOLIC BLOOD PRESSURE: 74 MMHG | OXYGEN SATURATION: 100 % | HEART RATE: 67 BPM | HEIGHT: 68 IN

## 2018-06-05 DIAGNOSIS — Z00.00 WELLNESS EXAMINATION: Primary | ICD-10-CM

## 2018-06-05 DIAGNOSIS — Z00.00 MEDICARE ANNUAL WELLNESS VISIT, INITIAL: Primary | ICD-10-CM

## 2018-06-05 DIAGNOSIS — N18.30 STAGE 3 CHRONIC KIDNEY DISEASE (H): ICD-10-CM

## 2018-06-05 DIAGNOSIS — D64.9 NORMOCYTIC ANEMIA: ICD-10-CM

## 2018-06-05 LAB
BUN SERPL-MCNC: 24 MG/DL (ref 7–30)
CALCIUM SERPL-MCNC: 9.6 MG/DL (ref 8.5–10.4)
CHLORIDE SERPLBLD-SCNC: 103 MMOL/L (ref 94–109)
CO2 SERPL-SCNC: 29 MMOL/L (ref 20–32)
CREAT SERPL-MCNC: 1.5 MG/DL (ref 0.8–1.5)
EGFR CALCULATED (BLACK REFERENCE): 57.6 ML/MIN
EGFR CALCULATED (NON BLACK REFERENCE): 47.6 ML/MIN
GLUCOSE SERPL-MCNC: 96 MG/DL (ref 60–109)
HCT VFR BLD AUTO: 38.9 % (ref 40–53)
HEMOGLOBIN: 12.4 G/DL (ref 13.3–17.7)
MCH RBC QN AUTO: 30.5 PG (ref 26.5–35)
MCHC RBC AUTO-ENTMCNC: 31.9 G/DL (ref 32–36)
MCV RBC AUTO: 95.8 FL (ref 78–100)
PLATELET # BLD AUTO: 144 K/UL (ref 150–450)
POTASSIUM SERPL-SCNC: 5 MMOL/L (ref 3.4–5.3)
RBC # BLD AUTO: 4.06 M/UL (ref 4.4–5.9)
SODIUM SERPL-SCNC: 139 MMOL/L (ref 133–144)
WBC # BLD AUTO: 6.1 K/UL (ref 4–11)

## 2018-06-05 NOTE — NURSING NOTE
I performed a vision test on pt, results as followed:  Left eye: 20/25 Right eye: 20/30  No glasses, only uses when reading.

## 2018-06-05 NOTE — NURSING NOTE
Medicare Wellness Visit  Health Risk Assessment           Health Risk Assessment / Review of Systems     Constitutional: Any fevers or night sweats? No     Eyes:  Vision problems   No     Hearing Do you feel you have hearing loss?   YES  Hearing loss since having loss of balance    Cardiovascular: Any chest pain, fast or irregular heart beat, calf pain with walking?     No           Respiratory:   Any breathing problems or cough?   No     Gastrointestinal: Any stomach or stool problems?   No      Genitourinary: Do you have difficulty controlling urination?   No     Muscles and Joints: Any joint stiffness or soreness?   No     Skin: Any concerning lesions or moles?   No     Nervous System: Any loss of strength or feeling, numbness or tingling, shaking, dizziness, or headache?   YES Dizziness(balance), states better than it used to be. Already DX    Mental Health: Any depression, anxiety or problems sleeping?    No     Cognition: Do you have any problems with your memory?  No            Medical Care     What other specialists or organizations are involved in your medical care?    Patient Care Team       Relationship Specialty Notifications Start Ilsa Campos, DO PCP - General Student in organized health care education/training program  10/18/17     Phone: [phone] Fax: [phone]         [facility] [address] [place], [place] [address] [place] [address]          Have you been to the ER or overnight in the hospital in the last year?  No          Social History / Home Safety       Marital Status:Single  Who lives in your household? By self    Do you feel threatened or controlled by a partner, ex-partner or anyone in your life? No     Has anyone hurt you physically, for example by pushing, hitting, slapping or kicking you   or forcing you to have sex? No          Does your home have any of the following safety concerns; loose rugs in the hallway,  bathrooms with no grab bars by the tub or toilet, stairs  with no handrails or poorly lit areas?  No     Do you need help with dressing yourself, bathing, eating or getting around your home?  No     Do you need help with the phone, transportation, shopping, preparing meals, housework, laundry, medications or managing money?No       Risk Behaviors and Healthy Habits     History   Smoking Status     Former Smoker   Smokeless Tobacco     Never Used     Comment: quit 40 years prior     How many servings of fruits and vegetables do you eat a day? 2-3 (education provided on 5 servings combine per day)    Exercise: 2-3 days/week for an average of 30-45 minutes walking      Do you frequently drive without a seatbelt? No     Do you use tobacco?  No     Do you use any other drugs? No         Do you use alcohol?No      Frailty Assessment            Have you lost 10 or more pounds unintentionally in the previous year? No     How difficult is walking from one room to the other on the same level?not   No     Is it difficult to lift or carry something as heavy as 10 pounds?not   No    Compared with most (men/women) your age, would you say  that you are more active, less active, or about the same? more   No    TU seconds  FALL RISK ASSESSMENT 2017   Fallen 2 or more times in the past year? No No   Any fall with injury in the past year? No No         Advance Directives:   Discussed with patient and family as appropriate. Has patient  completed advance directives and/or a living will? no  Given form and educated on completing advance directive    Nohelia Little RN

## 2018-06-05 NOTE — MR AVS SNAPSHOT
After Visit Summary   6/5/2018    Kevin Saldana    MRN: [id number]           Patient Information     Date Of Birth          1935        Visit Information        Provider Department      6/5/2018 9:40 AM Rn, Rena Ump [facility]        Today's Diagnoses     Wellness examination    -  1       Follow-ups after your visit        Your next 10 appointments already scheduled     Jun 05, 2018  9:40 AM CDT   65+ Annual Wellness with Pv [facility] Rn   [facility] ([facility])    [address] [place], [place] [address]. [facility] [address]   [phone]            Jun 05, 2018 10:00 AM CDT   65+ Annual Wellness with Ilsa Wayne DO   [facility] ([facility])    [address] [place], [place] [address]. [facility] [address]   [phone]              Who to contact     Please call your clinic at [phone] to:    Ask questions about your health    Make or cancel appointments    Discuss your medicines    Learn about your test results    Speak to your doctor            Additional Information About Your Visit        Care EveryWhere ID     This is your Care EveryWhere ID. This could be used by other organizations to access your [facility] medical records  [id number]         Blood Pressure from Last 3 Encounters:   04/26/18 128/74   03/29/18 152/78   01/19/18 127/72    Weight from Last 3 Encounters:   04/26/18 157 lb 6.4 oz (71.4 kg)   03/29/18 153 lb 12.8 oz (69.8 kg)   01/19/18 152 lb 8 oz (69.2 kg)              Today, you had the following     No orders found for display       Primary Care Provider Office Phone # Fax #    Ilsa Rachel Wayne [phone]       [facility] [address] [place] [address]        Equal Access to Services     OSITO FRASER AH: Hadii aad ku hadasho Soomaali, waaxda luqadaha, qaybta kaalmada adeegyada, siomara torresn uvaldo milligan. So [facility] [phone].    ATENCIÓN: Si habla español, tiene a nguyen disposición servicios gratuitos de  asistencia lingüística. Ziyad al [phone].    We comply with applicable federal civil rights laws and [place] laws. We do not discriminate on the basis of race, color, national origin, age, disability, sex, sexual orientation, or gender identity.            Thank you!     Thank you for choosing [facility]  for your care. Our goal is always to provide you with excellent care. Hearing back from our patients is one way we can continue to improve our services. Please take a few minutes to complete the written survey that you may receive in the mail after your visit with us. Thank you!             Your Updated Medication List - Protect others around you: Learn how to safely use, store and throw away your medicines at www.disposemymeds.org.          This list is accurate as of 6/5/18  9:39 AM.  Always use your most recent med list.                   Brand Name Dispense Instructions for use Diagnosis    fluticasone 50 MCG/ACT spray    FLONASE    3 Bottle    Spray 1-2 sprays into both nostrils daily    Chronic non-seasonal allergic rhinitis, unspecified trigger       hydrochlorothiazide 25 MG tablet    HYDRODIURIL    90 tablet    Take 1 tablet (25 mg) by mouth daily    Benign essential hypertension       lisinopril 10 MG tablet    PRINIVIL/ZESTRIL    90 tablet    Take 1 tablet (10 mg) by mouth daily    Benign essential hypertension       metoprolol tartrate 100 MG tablet    LOPRESSOR    135 tablet    Take 50 mg (half tablet) in the morning and 100 mg (1 full tablet) in the evening.    Benign essential hypertension       order for DME     1 Device    Equipment being ordered: FRANCISCO JAVIER stockings    Orthostasis       polyethylene glycol powder    MIRALAX/GLYCOLAX    510 g    Take 17 g by mouth daily    Constipation, unspecified constipation type

## 2018-06-05 NOTE — PROGRESS NOTES
Annual Wellness Visit for 65 years and older    HPI  This 83 year old male presents as an established patient  Ilsa Wayne who presents for an annual wellness visit.    Other issues patient wants to be addressed today:  Chief Complaint   Patient presents with     Wellness Visit       Patient Active Problem List   Diagnosis     Health Care Home     S/P TURP     AK (actinic keratosis)     Benign essential hypertension     Balance problems     Stage 3 chronic kidney disease     Chronic non-seasonal allergic rhinitis, unspecified trigger     Flaccid neuropathic bladder, not elsewhere classified     Urine test positive for microalbuminuria - has not yet met time criteria for diagnosis        Past Medical History:   Diagnosis Date     AK (actinic keratosis) 10/7/2015     Benign essential hypertension 10/7/2015     H/O removal of neck cyst 3/30/2016       Family History   Problem Relation Age of Onset     CANCER Mother      lung     Other Cancer Mother      Lung?     C.A.D. No family hx of      DIABETES No family hx of      Hypertension No family hx of      Prostate Cancer No family hx of      Cancer - colorectal No family hx of      Coronary Artery Disease No family hx of      Hyperlipidemia No family hx of      CEREBROVASCULAR DISEASE No family hx of      Breast Cancer No family hx of      Colon Cancer No family hx of      Depression No family hx of      Anxiety Disorder No family hx of      MENTAL ILLNESS No family hx of      Substance Abuse No family hx of      Anesthesia Reaction No family hx of      Asthma No family hx of      OSTEOPOROSIS No family hx of      Genetic Disorder No family hx of      Thyroid Disease No family hx of      Obesity No family hx of      Unknown/Adopted No family hx of      Problem List, Family History and past Medical History reviewed and  unchanged/updated.    Nursing Notes:   Nohelia Little, RN  6/5/2018  9:40 AM  Signed  Medicare Wellness Visit  Health Risk Assessment            Health Risk Assessment / Review of Systems     Constitutional: Any fevers or night sweats? No     Eyes:  Vision problems   No     Hearing Do you feel you have hearing loss?   YES  Hearing loss since having loss of balance    Cardiovascular: Any chest pain, fast or irregular heart beat, calf pain with walking?     No           Respiratory:   Any breathing problems or cough?   No     Gastrointestinal: Any stomach or stool problems?   No      Genitourinary: Do you have difficulty controlling urination?   No     Muscles and Joints: Any joint stiffness or soreness?   No     Skin: Any concerning lesions or moles?   No     Nervous System: Any loss of strength or feeling, numbness or tingling, shaking, dizziness, or headache?   YES Dizziness(balance), states better than it used to be. Already DX    Mental Health: Any depression, anxiety or problems sleeping?    No     Cognition: Do you have any problems with your memory?  No            Medical Care     What other specialists or organizations are involved in your medical care?    Patient Care Team       Relationship Specialty Notifications Start End    Ilsa Wayne, DO PCP - General Student in organized health care education/training program  10/18/17     Phone: [phone] Fax: [phone]         [address] [place] [address]          Have you been to the ER or overnight in the hospital in the last year?  No          Social History / Home Safety       Marital Status:Single  Who lives in your household? By self    Do you feel threatened or controlled by a partner, ex-partner or anyone in your life? No     Has anyone hurt you physically, for example by pushing, hitting, slapping or kicking you   or forcing you to have sex? No          Does your home have any of the following safety concerns; loose rugs in the hallway,  bathrooms with no grab bars by the tub or toilet, stairs with no handrails or poorly lit areas?  No     Do you need  help with dressing yourself, bathing, eating or getting around your home?  No     Do you need help with the phone, transportation, shopping, preparing meals, housework, laundry, medications or managing money?No       Risk Behaviors and Healthy Habits     History   Smoking Status     Former Smoker   Smokeless Tobacco     Never Used     Comment: quit 40 years prior     How many servings of fruits and vegetables do you eat a day? 2-3 (education provided on 5 servings combine per day)    Exercise: 2-3 days/week for an average of 30-45 minutes walking      Do you frequently drive without a seatbelt? No     Do you use tobacco?  No     Do you use any other drugs? No         Do you use alcohol?No      Frailty Assessment            Have you lost 10 or more pounds unintentionally in the previous year? No     How difficult is walking from one room to the other on the same level?not   No     Is it difficult to lift or carry something as heavy as 10 pounds?not   No    Compared with most (men/women) your age, would you say  that you are more active, less active, or about the same? more   No    TU seconds  FALL RISK ASSESSMENT 2017   Fallen 2 or more times in the past year? No No   Any fall with injury in the past year? No No         Advance Directives:   Discussed with patient and family as appropriate. Has patient  completed advance directives and/or a living will? no  Given form and educated on completing advance directive    BRIJESH Ortega Maria E [facility]  2018  9:59 AM  Signed  I performed a vision test on pt, results as followed:  Left eye: 20/25 Right eye: 20/30  No glasses, only uses when reading.    Are you sexually active?  No   Have you had any sexually transmitted infections? No     Frailty Assessment  1. Weight loss (>5% in year)  No  Wt Readings from Last 5 Encounters:   18 155 lb 12.8 oz (70.7 kg)   18 157 lb 6.4 oz (71.4 kg)   18 153 lb 12.8 oz (69.8 kg)    01/19/18 152 lb 8 oz (69.2 kg)   01/02/18 156 lb 9.6 oz (71 kg)     2. Exhaustion (perceived effort for a given activity)   How difficult is walking from one room to the other on the same level?not   No     3. Weakness (handgrip strength)   How difficult is lifting or carrying something as heavy as 10 pounds? not   No    4. Decreased physical activity    Compared with most (men/women) your age, would you say  that you are more active, less active, or about the same? More   No    5. Slow gait speed (timed up and go > 12 sec.)  No  FALL RISK ASSESSMENT 6/5/2018 11/20/2017   Fallen 2 or more times in the past year? No No   Any fall with injury in the past year? No No       Frailty screen score: 0    Frail Assessment:0 Robust     EVALUATION OF COGNITIVE FUNCTION  Mood/affect:Normal  Appearance:Normal  Family member/caregiver input: n/a    PHQ-2 Score:   PHQ-2 ( 1999 Pfizer) 1/2/2018 11/20/2017   Q1: Little interest or pleasure in doing things 0 0   Q2: Feeling down, depressed or hopeless 0 0   PHQ-2 Score 0 0       PHQ-9 Score:   No flowsheet data found.    Mini Cog Test:    Recall result:  3 points   Clock Draw Test result: Normal    Cognitive screen is:Negative      Other Assessments:  CV Risk based on Pooled Cohort Risk   The ASCVD Risk score (Boise DC Jr, et al., 2013) failed to calculate for the following reasons:    The 2013 ASCVD risk score is only valid for ages 40 to 79    ECG (if done)not performed    Corrected Visual acuity: L 20/25   R 20/30  Hearing evaluation if done: No      Advance Directives: Discussed with patient and family as appropriate. Has patient completed advance directives and/or a living will? no      Immunization History   Administered Date(s) Administered     Influenza (High Dose) 3 valent vaccine 09/17/2016, 09/19/2017     Influenza (IIV3) PF 10/13/2010, 09/20/2012, 09/19/2013, 09/01/2015     Pneumo Conj 13-V (2010&after) 09/25/2015     Pneumococcal 23 valent 01/01/2009, 10/14/2016     TD  "(ADULT, 7+) 06/05/2007     Tdap (Adacel,Boostrix) 08/14/2013     Zoster vaccine, live 08/14/2013     Reviewed Immunization Record Today  Physical Exam  Vitals: /74  Pulse 67  Temp 97.6  F (36.4  C) (Oral)  Ht 5' 8\" (172.7 cm)  Wt 155 lb 12.8 oz (70.7 kg)  SpO2 100%  BMI 23.69 kg/m2  BMI= Body mass index is 23.69 kg/(m^2).  EXAM:  GENERAL: Kevin is a pleasant elderly male in no acute distress  HEART: Regular rate and rhythm, no murmurs  LUNGS: Clear to auscultation bilaterally, unlabored   : restrepo catheter present with leg bag  NEURO: grossly intact  MSK: ambulates with a cane    Assessment and Plan:    1. Medicare annual wellness visit, initial  Reviewed Preventive Services and Plan form with patient as specified in Patient Instructions.  Positive findings on assessment: none    2. Normocytic anemia  - CBC with Plt ([facility])    Mild normocytic anemia persists, Hgb 12.4, MCV 95.8. Next visit, will obtain labs for iron deficiency (ferritin). Differential diagnosis includes anemia of chronic disease secondary to CKD3. Consider reticulocyte index to evaluate for destruction/loss.     3. Stage 3 chronic kidney disease  - Basic Metabolic Panel ([facility]) - Results < 1 hr    Relatively stable, fluctuates between mid 40's to low 50's. Urine microscopy was obtained in January 2018 without any RBC. Proteinuria is present, albumin creatinine ratio was positive in December 2017 and January 2018. Underwent extensive workup late 2017/early 2018 without identifying underlying etiology. Need to repeat microalbuminuria to confirm diagnosis > 3 months from initial testing (anytime now). Recheck next visit.     Options for treatment and follow-up care were reviewed with the Kevin Saldana  and/or guardian engaged in the decision making process and verbalized  understanding of the options discussed and agreed with the final plan.    Precepted with Dr. Marcy Wayne, DO    "

## 2018-06-05 NOTE — MR AVS SNAPSHOT
After Visit Summary   6/5/2018    Kevin Saldana    MRN: [id number]           Patient Information     Date Of Birth          1935        Visit Information        Provider Department      6/5/2018 10:00 AM Ilsa Wayne, DO [facility]        Today's Diagnoses     Medication management    -  1    Normocytic anemia        Stage 3 chronic kidney disease          Care Instructions    1. Should get new Shingrix vaccine for shingles.   2. Review advanced directive honoring choices document. Can bring back to clinic to have notorized.   3. We will call with lab results.         PERSONAL PREVENTIVE SERVICES PLAN - SERVICES     Review these tests with your medical staff then decide which ones you want and take this page home for your reference      SCREENING TESTS     Description   Year of Last Screening   Recommended Today?   Heart disease screening blood tests    Cholesterol level Reducing cholesterol can reduce your risk of heart attacks by 25%.  Screening is recommended yearly if you are at risk of heart disease otherwise every 4-5 years 12/2017 No; is up to date.   Diabetes screening tests    Hemoglobin A1c blood test   Finding and treating diabetes early can reduce complications.  Screening recommended/covered yearly if you have high blood pressure, high cholesterol, obesity (BMI >30), or a history of high blood glucose tests; or 2 of the following: family history of diabetes, overweight (BMI >25 but <30), age 65 years or older, and a history of diabetes of pregnancy or gave birth to baby weighing more than 9 lbs. 11/2017 No; is up to date.   Hepatitis B screening Finding hepatitis B early can reduce complications.  Screening is recommended for persons with selected risk factors.  No: is not indicated today.   Hepatitis C screening Finding hepatitis C early can reduce complications.  Screening is recommended for all persons born from 1945 through 1965 and for those with selected other  risk factors.   No: is not indicated today.   HIV screening Finding HIV early can reduce complications.  Screening is recommended for persons with risk factors for HIV infection.  No: is not indicated today.   Glaucoma screening Early detection of glaucoma can prevent blindness.   Please talk to your eye doctor about this.       SCREENING TESTS     Description   Year of Last Screening   Recommended Today?   Colorectal cancer screening    Fecal occult blood test     Screening colonoscopy Screening for colon cancer has been shown to reduce death from colon cancer by 25-30%. Screening recommended to start at 50 years and continuing until age 75 years.    No: is not indicated today.   Breast Cancer Screening (women)    Mammogram Mammogram screening for breast cancer has been shown to reduce the risk of dying from breast cancer and prolong life. Screening is recommended every 1-2 years for women aged 50 to 74 years.   No: is not indicated today.   Cervical Cancer screening (women)    Pap Cervical pap smears can reduce cervical cancer. Screening is recommended annually if high risk (history of abnormal pap smears) otherwise every 2-3 years, stop screening at 65 years of age if history of normal paps.  No: is not indicated today.   Screening for Osteoporosis:  Bone mass measurements (women)    Dexa Scan Screening and treating Osteoporosis can reduce the risk of hip and spine fractures. Screening is recommended in women 65 years or older and in women and men at risk of osteoporosis.  No: is not indicated today.   Screening for Lung Cancer     Low-dose CT scanning Screening can reduce mortality in persons aged 55-80 who have smoked at least 30 pack-years and who are either still smoking or have quit in the past 15 years.  No: is not indicated today.   Abdominal Aortic Aneurysm (AAA) screening    Ultrasound (US)   An aneurysm treated before rupture is very safe -a ruptured aneurysm can be fatal.  Screening  by US for AAA is  limited to patients who meet one of the following criteria:    Men who are 65-75 years old and have smoked more than 100 cigarettes in their lifetime    Anyone with a family history of abdominal aortic aneurysm  No: is not indicated today.     Here are your recommended immunizations.  Take this home for your reference.                                                    IMMUNIZATIONS Description Recommend today?     Influenza (Flu shot) Prevents flu; should get every year No: is not indicated today.   PCV 13 Pneumonia vaccination; you get it once No; is up to date.   PPSV 23 Second pneumonia vaccination; usually get it 1 year after PCV 13 No; is up to date.   Zoster (Shingles) Prevents shingles; you get it once  (Check with Part D insurance for coverage, must receive at a pharmacy, not clinic) No; is up to date.   Tetanus Prevents tetanus; once every 10 years No; is up to date.     Hepatitis B  If you have any of the following risk factors you should be immunized for hepatitis B: severe kidney disease, people who live in the same house as a carrier of Hepatitis B virus, people who live in  institutions (e.g. nursing homes or group homes), homosexual men, patients with hemophilia who received Factor VIII or IX concentrates, abusers of illicit injectable drugs No: is not indicated today.      PATIENT INSTRUCTIONS    Yearly exam:     See your health care provider every year in order to review changes in your health, review medicines that you take, and discuss preventive care needs such as immunizations and cancer screening.    Get a flu shot each year.     Advance Directives:    If you have not done so, you are encouraged to complete advance directives and/or a living will.   More information about advance directives can be found at: http://www.mnmed.org/advocacy/Key-Issues/Advance-Directives    Nutrition:     Eat at least 5 servings of fruits and vegetables each day.     Eat whole-grain bread, whole-wheat pasta and  "brown rice instead of white grains and rice.     Talk to your doctor about Calcium and Vitamin D.     Lifestyle:    Exercise for at least 150 minutes a week (30 minutes a day, 5 days a week). This will help you control your weight and prevent disease.     Limit alcohol to one drink per day.     If you smoke, try to quit - your doctor will be happy to help.     Wear sunscreen to prevent skin cancer.     See your dentist every six months for an exam and cleaning.     See your eye doctor every 1 to 2 years to screen for conditions such as glaucoma, macular degeneration and cataracts.                        Follow-ups after your visit        Who to contact     Please call your clinic at [phone] to:    Ask questions about your health    Make or cancel appointments    Discuss your medicines    Learn about your test results    Speak to your doctor            Additional Information About Your Visit        Care EveryWhere ID     This is your Care EveryWhere ID. This could be used by other organizations to access your [place] medical records  [id number]        Your Vitals Were     Pulse Temperature Height Pulse Oximetry BMI (Body Mass Index)       67 97.6  F (36.4  C) (Oral) 5' 8\" (172.7 cm) 100% 23.69 kg/m2        Blood Pressure from Last 3 Encounters:   06/05/18 131/74   04/26/18 128/74   03/29/18 152/78    Weight from Last 3 Encounters:   06/05/18 155 lb 12.8 oz (70.7 kg)   04/26/18 157 lb 6.4 oz (71.4 kg)   03/29/18 153 lb 12.8 oz (69.8 kg)              We Performed the Following     Basic Metabolic Panel ([facility]) - Results < 1 hr     CBC with Plt (P [facility])        Primary Care Provider Office Phone # Fax #    Ilsa Ramos DO [place] [phone]       [facility] [address] [place] [address] [address] [place] [address]        Equal Access to Services     ERNA FRASER AH: Katt mann Sodavidson, waaxda luqadaha, qaybta kaalmada uvaldoyarojas, siomara milligan. So wa " [phone].    ATENCIÓN: Si maría rodriguez, tiene a nguyen disposición servicios gratuitos de asistencia lingüística. Ziyad mayes [phone].    We comply with applicable federal civil rights laws and [place] laws. We do not discriminate on the basis of race, color, national origin, age, disability, sex, sexual orientation, or gender identity.            Thank you!     Thank you for choosing [facility]  for your care. Our goal is always to provide you with excellent care. Hearing back from our patients is one way we can continue to improve our services. Please take a few minutes to complete the written survey that you may receive in the mail after your visit with us. Thank you!             Your Updated Medication List - Protect others around you: Learn how to safely use, store and throw away your medicines at www.disposemymeds.org.          This list is accurate as of 6/5/18 10:51 AM.  Always use your most recent med list.                   Brand Name Dispense Instructions for use Diagnosis    fluticasone 50 MCG/ACT spray    FLONASE    3 Bottle    Spray 1-2 sprays into both nostrils daily    Chronic non-seasonal allergic rhinitis, unspecified trigger       hydrochlorothiazide 25 MG tablet    HYDRODIURIL    90 tablet    Take 1 tablet (25 mg) by mouth daily    Benign essential hypertension       lisinopril 10 MG tablet    PRINIVIL/ZESTRIL    90 tablet    Take 1 tablet (10 mg) by mouth daily    Benign essential hypertension       metoprolol tartrate 100 MG tablet    LOPRESSOR    135 tablet    Take 50 mg (half tablet) in the morning and 100 mg (1 full tablet) in the evening.    Benign essential hypertension       order for DME     1 Device    Equipment being ordered: FRANCISCO JAVIER stockings    Orthostasis       polyethylene glycol powder    MIRALAX/GLYCOLAX    510 g    Take 17 g by mouth daily    Constipation, unspecified constipation type

## 2018-06-05 NOTE — PROGRESS NOTES
Preceptor Attestation:  Patient's case reviewed and discussed with Dr. Wayne. Patient seen and discussed with the resident.  I agree with written assessment and plan of care.  Supervising Physician:  Marcy Del Valle MD  [facility]

## 2018-06-05 NOTE — PATIENT INSTRUCTIONS
1. Should get new Shingrix vaccine for shingles.   2. Review advanced directive honoring choices document. Can bring back to clinic to have notorized.   3. We will call with lab results.         PERSONAL PREVENTIVE SERVICES PLAN - SERVICES     Review these tests with your medical staff then decide which ones you want and take this page home for your reference      SCREENING TESTS     Description   Year of Last Screening   Recommended Today?   Heart disease screening blood tests    Cholesterol level Reducing cholesterol can reduce your risk of heart attacks by 25%.  Screening is recommended yearly if you are at risk of heart disease otherwise every 4-5 years 12/2017 No; is up to date.   Diabetes screening tests    Hemoglobin A1c blood test   Finding and treating diabetes early can reduce complications.  Screening recommended/covered yearly if you have high blood pressure, high cholesterol, obesity (BMI >30), or a history of high blood glucose tests; or 2 of the following: family history of diabetes, overweight (BMI >25 but <30), age 65 years or older, and a history of diabetes of pregnancy or gave birth to baby weighing more than 9 lbs. 11/2017 No; is up to date.   Hepatitis B screening Finding hepatitis B early can reduce complications.  Screening is recommended for persons with selected risk factors.  No: is not indicated today.   Hepatitis C screening Finding hepatitis C early can reduce complications.  Screening is recommended for all persons born from 1945 through 1965 and for those with selected other risk factors.   No: is not indicated today.   HIV screening Finding HIV early can reduce complications.  Screening is recommended for persons with risk factors for HIV infection.  No: is not indicated today.   Glaucoma screening Early detection of glaucoma can prevent blindness.   Please talk to your eye doctor about this.       SCREENING TESTS     Description   Year of Last Screening   Recommended Today?    Colorectal cancer screening    Fecal occult blood test     Screening colonoscopy Screening for colon cancer has been shown to reduce death from colon cancer by 25-30%. Screening recommended to start at 50 years and continuing until age 75 years.    No: is not indicated today.   Breast Cancer Screening (women)    Mammogram Mammogram screening for breast cancer has been shown to reduce the risk of dying from breast cancer and prolong life. Screening is recommended every 1-2 years for women aged 50 to 74 years.   No: is not indicated today.   Cervical Cancer screening (women)    Pap Cervical pap smears can reduce cervical cancer. Screening is recommended annually if high risk (history of abnormal pap smears) otherwise every 2-3 years, stop screening at 65 years of age if history of normal paps.  No: is not indicated today.   Screening for Osteoporosis:  Bone mass measurements (women)    Dexa Scan Screening and treating Osteoporosis can reduce the risk of hip and spine fractures. Screening is recommended in women 65 years or older and in women and men at risk of osteoporosis.  No: is not indicated today.   Screening for Lung Cancer     Low-dose CT scanning Screening can reduce mortality in persons aged 55-80 who have smoked at least 30 pack-years and who are either still smoking or have quit in the past 15 years.  No: is not indicated today.   Abdominal Aortic Aneurysm (AAA) screening    Ultrasound (US)   An aneurysm treated before rupture is very safe -a ruptured aneurysm can be fatal.  Screening  by US for AAA is limited to patients who meet one of the following criteria:    Men who are 65-75 years old and have smoked more than 100 cigarettes in their lifetime    Anyone with a family history of abdominal aortic aneurysm  No: is not indicated today.     Here are your recommended immunizations.  Take this home for your reference.                                                    IMMUNIZATIONS Description Recommend  today?     Influenza (Flu shot) Prevents flu; should get every year No: is not indicated today.   PCV 13 Pneumonia vaccination; you get it once No; is up to date.   PPSV 23 Second pneumonia vaccination; usually get it 1 year after PCV 13 No; is up to date.   Zoster (Shingles) Prevents shingles; you get it once  (Check with Part D insurance for coverage, must receive at a pharmacy, not clinic) No; is up to date.   Tetanus Prevents tetanus; once every 10 years No; is up to date.     Hepatitis B  If you have any of the following risk factors you should be immunized for hepatitis B: severe kidney disease, people who live in the same house as a carrier of Hepatitis B virus, people who live in  institutions (e.g. nursing homes or group homes), homosexual men, patients with hemophilia who received Factor VIII or IX concentrates, abusers of illicit injectable drugs No: is not indicated today.      PATIENT INSTRUCTIONS    Yearly exam:     See your health care provider every year in order to review changes in your health, review medicines that you take, and discuss preventive care needs such as immunizations and cancer screening.    Get a flu shot each year.     Advance Directives:    If you have not done so, you are encouraged to complete advance directives and/or a living will.   More information about advance directives can be found at: http://www.mnmed.org/advocacy/Key-Issues/Advance-Directives    Nutrition:     Eat at least 5 servings of fruits and vegetables each day.     Eat whole-grain bread, whole-wheat pasta and brown rice instead of white grains and rice.     Talk to your doctor about Calcium and Vitamin D.     Lifestyle:    Exercise for at least 150 minutes a week (30 minutes a day, 5 days a week). This will help you control your weight and prevent disease.     Limit alcohol to one drink per day.     If you smoke, try to quit - your doctor will be happy to help.     Wear sunscreen to prevent skin cancer.     See  your dentist every six months for an exam and cleaning.     See your eye doctor every 1 to 2 years to screen for conditions such as glaucoma, macular degeneration and cataracts.

## 2018-06-06 NOTE — PROGRESS NOTES
Please call Kevin with his lab results.    Orquidea Brown,    I have reviewed your lab results for your kidney function, electrolytes, and blood counts. Your kidney function is mildly reduced but stable. You also have a very mild anemia. The rest of your labs are normal. We will continue to monitor your labs.     Please let me know if you have any questions.     Sincerely,  Ilsa Wayne, DO

## 2018-06-18 ENCOUNTER — MEDICAL CORRESPONDENCE (OUTPATIENT)
Dept: HEALTH INFORMATION MANAGEMENT | Facility: CLINIC | Age: 83
End: 2018-06-18

## 2018-06-26 ENCOUNTER — ALLIED HEALTH/NURSE VISIT (OUTPATIENT)
Dept: FAMILY MEDICINE | Facility: CLINIC | Age: 83
End: 2018-06-26

## 2018-06-26 VITALS
BODY MASS INDEX: 23.34 KG/M2 | HEART RATE: 69 BPM | WEIGHT: 154 LBS | OXYGEN SATURATION: 98 % | TEMPERATURE: 97.7 F | DIASTOLIC BLOOD PRESSURE: 66 MMHG | SYSTOLIC BLOOD PRESSURE: 132 MMHG | HEIGHT: 68 IN

## 2018-06-26 DIAGNOSIS — I10 BENIGN ESSENTIAL HYPERTENSION: Primary | ICD-10-CM

## 2018-07-25 ENCOUNTER — ALLIED HEALTH/NURSE VISIT (OUTPATIENT)
Dept: FAMILY MEDICINE | Facility: CLINIC | Age: 83
End: 2018-07-25

## 2018-07-25 VITALS
SYSTOLIC BLOOD PRESSURE: 134 MMHG | HEART RATE: 79 BPM | HEIGHT: 68 IN | TEMPERATURE: 97.7 F | BODY MASS INDEX: 22.97 KG/M2 | DIASTOLIC BLOOD PRESSURE: 68 MMHG | WEIGHT: 151.6 LBS | OXYGEN SATURATION: 98 %

## 2018-07-25 DIAGNOSIS — Z01.30 BP CHECK: Primary | ICD-10-CM

## 2018-08-13 DIAGNOSIS — I10 BENIGN ESSENTIAL HYPERTENSION: ICD-10-CM

## 2018-08-13 RX ORDER — METOPROLOL TARTRATE 100 MG
TABLET ORAL
Qty: 135 TABLET | Refills: 3 | Status: SHIPPED | OUTPATIENT
Start: 2018-08-13 | End: 2019-07-23

## 2018-08-17 ENCOUNTER — ALLIED HEALTH/NURSE VISIT (OUTPATIENT)
Dept: FAMILY MEDICINE | Facility: CLINIC | Age: 83
End: 2018-08-17

## 2018-08-17 VITALS
SYSTOLIC BLOOD PRESSURE: 132 MMHG | HEART RATE: 77 BPM | HEIGHT: 67 IN | BODY MASS INDEX: 23.58 KG/M2 | OXYGEN SATURATION: 98 % | TEMPERATURE: 98.8 F | DIASTOLIC BLOOD PRESSURE: 79 MMHG | WEIGHT: 150.25 LBS

## 2018-08-17 DIAGNOSIS — I10 BENIGN ESSENTIAL HYPERTENSION: Primary | ICD-10-CM

## 2018-09-10 ENCOUNTER — MEDICAL CORRESPONDENCE (OUTPATIENT)
Dept: HEALTH INFORMATION MANAGEMENT | Facility: CLINIC | Age: 83
End: 2018-09-10

## 2018-09-11 ENCOUNTER — OFFICE VISIT (OUTPATIENT)
Dept: FAMILY MEDICINE | Facility: CLINIC | Age: 83
End: 2018-09-11

## 2018-09-11 VITALS
DIASTOLIC BLOOD PRESSURE: 74 MMHG | SYSTOLIC BLOOD PRESSURE: 119 MMHG | HEIGHT: 68 IN | OXYGEN SATURATION: 98 % | WEIGHT: 154.4 LBS | TEMPERATURE: 97.5 F | RESPIRATION RATE: 14 BRPM | HEART RATE: 68 BPM | BODY MASS INDEX: 23.4 KG/M2

## 2018-09-11 DIAGNOSIS — I10 BENIGN ESSENTIAL HYPERTENSION: Primary | ICD-10-CM

## 2018-09-11 DIAGNOSIS — Z00.00 HEALTHCARE MAINTENANCE: ICD-10-CM

## 2018-09-11 NOTE — MR AVS SNAPSHOT
"              After Visit Summary   9/11/2018    Kevin Saldana    MRN: [id number]           Patient Information     Date Of Birth          1935        Visit Information        Provider Department      9/11/2018 9:40 AM Ceci Jaramillo DO [facility]        Today's Diagnoses     Benign essential hypertension    -  1    Healthcare maintenance           Follow-ups after your visit        Follow-up notes from your care team     Return in about 6 months (around 3/11/2019).      Who to contact     Please call your clinic at [phone] to:    Ask questions about your health    Make or cancel appointments    Discuss your medicines    Learn about your test results    Speak to your doctor            Additional Information About Your Visit        Care EveryWhere ID     This is your Care EveryWhere ID. This could be used by other organizations to access your [place] medical records  [id number]        Your Vitals Were     Pulse Temperature Respirations Height Pulse Oximetry BMI (Body Mass Index)    68 97.5  F (36.4  C) (Oral) 14 5' 7.95\" (172.6 cm) 98% 23.51 kg/m2       Blood Pressure from Last 3 Encounters:   09/11/18 119/74   08/17/18 132/79   07/25/18 134/68    Weight from Last 3 Encounters:   09/11/18 154 lb 6.4 oz (70 kg)   08/17/18 150 lb 4 oz (68.2 kg)   07/25/18 151 lb 9.6 oz (68.8 kg)              Today, you had the following     No orders found for display       Primary Care Provider Office Phone #    Ceci DO Clint [phone]       [address] [place] [address]        Equal Access to Services     [facility] BRIA AH: Hadii aad ku hadasho Soomaali, waaxda luqadaha, qaybta kaalmada adeegyada, waxbright kentonin hayandria adejaxon kelsey . So [facility] [phone].    ATENCIÓN: Si habla español, tiene a nguyen disposición servicios gratuitos de asistencia lingüística. Llame al [phone].    We comply with applicable federal civil rights laws and [place] laws. We do not discriminate on the basis " of race, color, national origin, age, disability, sex, sexual orientation, or gender identity.            Thank you!     Thank you for choosing [facility]  for your care. Our goal is always to provide you with excellent care. Hearing back from our patients is one way we can continue to improve our services. Please take a few minutes to complete the written survey that you may receive in the mail after your visit with us. Thank you!             Your Updated Medication List - Protect others around you: Learn how to safely use, store and throw away your medicines at www.disposemymeds.org.          This list is accurate as of 9/11/18 11:59 PM.  Always use your most recent med list.                   Brand Name Dispense Instructions for use Diagnosis    fluticasone 50 MCG/ACT spray    FLONASE    3 Bottle    Spray 1-2 sprays into both nostrils daily    Chronic non-seasonal allergic rhinitis, unspecified trigger       hydrochlorothiazide 25 MG tablet    HYDRODIURIL    90 tablet    Take 1 tablet (25 mg) by mouth daily    Benign essential hypertension       lisinopril 10 MG tablet    PRINIVIL/ZESTRIL    90 tablet    Take 1 tablet (10 mg) by mouth daily    Benign essential hypertension       metoprolol tartrate 100 MG tablet    LOPRESSOR    135 tablet    Take 50 mg (half tablet) in the morning and 100 mg (1 full tablet) in the evening.    Benign essential hypertension       order for DME     1 Device    Equipment being ordered: FRANCISCO JAVIER stockings    Orthostasis       polyethylene glycol powder    MIRALAX/GLYCOLAX    510 g    Take 17 g by mouth daily    Constipation, unspecified constipation type

## 2018-09-11 NOTE — PROGRESS NOTES
"       Subjective:   Kevin Saldana is a 83 year old year old male who presents to clinic today for the following health issues:    HTN:  130s/60s at via home health nurse  Stable on metoprolol/lisinopril/HCTZ  No CP, SOB, headaches  Administers his own medications  Lives alone         PMHX:   PAST MEDICAL HISTORY:  Patient Active Problem List   Diagnosis     Health Care Home     S/P TURP     AK (actinic keratosis)     Benign essential hypertension     Balance problems     Stage 3 chronic kidney disease     Chronic non-seasonal allergic rhinitis, unspecified trigger     Flaccid neuropathic bladder, not elsewhere classified     Urine test positive for microalbuminuria - has not yet met time criteria for diagnosis        CURRENT MEDICATIONS:  Current Outpatient Prescriptions   Medication Sig Dispense Refill     fluticasone (FLONASE) 50 MCG/ACT spray Spray 1-2 sprays into both nostrils daily 3 Bottle 3     hydrochlorothiazide (HYDRODIURIL) 25 MG tablet Take 1 tablet (25 mg) by mouth daily 90 tablet 3     lisinopril (PRINIVIL/ZESTRIL) 10 MG tablet Take 1 tablet (10 mg) by mouth daily 90 tablet 3     metoprolol tartrate (LOPRESSOR) 100 MG tablet Take 50 mg (half tablet) in the morning and 100 mg (1 full tablet) in the evening. 135 tablet 3     order for DME Equipment being ordered: FRANCISCO JAVIER stockings 1 Device 0     polyethylene glycol (MIRALAX/GLYCOLAX) powder Take 17 g by mouth daily 510 g 11       ALLERGIES:     Allergies   Allergen Reactions     Nkda [No Known Drug Allergies]             Objective:     Vitals:    09/11/18 0929   BP: 119/74   Pulse: 68   Resp: 14   Temp: 97.5  F (36.4  C)   TempSrc: Oral   SpO2: 98%   Weight: 154 lb 6.4 oz (70 kg)   Height: 5' 7.95\" (172.6 cm)     Body mass index is 23.51 kg/(m^2).  No results found for this or any previous visit (from the past 24 hour(s)).    General: Alert, well-appearing male in NAD  Pulm: CTA BL, no tachypnea  CV: RRR, no murmur  Ext: Warm, well perfused, 2+ BL radial " pulses, no LE edema  Psych: Mood appropriate to visit content, full affect, rational thought content and process    Assessment and Plan:   1. Benign essential hypertension  Well controlled on current regimen. No medication adjustments made today and patient does not need refills.     2. Healthcare maintenance  Declined advanced directive. Plans to return for flu shot when available.     Follow up: 6 months for BP follow up  Options for treatment and follow-up care were reviewed with the patient and/or guardian. Kevin Saldana and/or guardian engaged in the decision making process and verbalized understanding of the options discussed and agreed with the final plan.    Ceci Jaramillo DO  [facility] Resident    Precepted with: Viviana Tolentino DO

## 2018-09-13 NOTE — PROGRESS NOTES
Preceptor Attestation:   Patient seen, evaluated and discussed with the resident. I have verified the content of the note, which accurately reflects my assessment of the patient and the plan of care.  Supervising Physician:Viviana Tolentino DO [facility]

## 2018-09-26 ENCOUNTER — MEDICAL CORRESPONDENCE (OUTPATIENT)
Dept: HEALTH INFORMATION MANAGEMENT | Facility: CLINIC | Age: 83
End: 2018-09-26

## 2018-10-26 ENCOUNTER — ALLIED HEALTH/NURSE VISIT (OUTPATIENT)
Dept: FAMILY MEDICINE | Facility: CLINIC | Age: 83
End: 2018-10-26

## 2018-10-26 VITALS
BODY MASS INDEX: 23.4 KG/M2 | OXYGEN SATURATION: 97 % | HEIGHT: 68 IN | WEIGHT: 154.4 LBS | SYSTOLIC BLOOD PRESSURE: 128 MMHG | DIASTOLIC BLOOD PRESSURE: 65 MMHG | TEMPERATURE: 97.6 F | HEART RATE: 76 BPM

## 2018-10-26 DIAGNOSIS — Z01.30 BLOOD PRESSURE CHECK: Primary | ICD-10-CM

## 2018-11-14 ENCOUNTER — ALLIED HEALTH/NURSE VISIT (OUTPATIENT)
Dept: FAMILY MEDICINE | Facility: CLINIC | Age: 83
End: 2018-11-14

## 2018-11-14 VITALS
TEMPERATURE: 97.5 F | RESPIRATION RATE: 18 BRPM | HEIGHT: 68 IN | WEIGHT: 156.6 LBS | BODY MASS INDEX: 23.73 KG/M2 | SYSTOLIC BLOOD PRESSURE: 144 MMHG | DIASTOLIC BLOOD PRESSURE: 81 MMHG | OXYGEN SATURATION: 100 % | HEART RATE: 75 BPM

## 2018-11-14 DIAGNOSIS — I10 BENIGN ESSENTIAL HYPERTENSION: Primary | ICD-10-CM

## 2018-11-23 ENCOUNTER — ALLIED HEALTH/NURSE VISIT (OUTPATIENT)
Dept: FAMILY MEDICINE | Facility: CLINIC | Age: 83
End: 2018-11-23

## 2018-11-23 VITALS
OXYGEN SATURATION: 98 % | SYSTOLIC BLOOD PRESSURE: 136 MMHG | HEART RATE: 99 BPM | RESPIRATION RATE: 18 BRPM | TEMPERATURE: 97.9 F | DIASTOLIC BLOOD PRESSURE: 80 MMHG

## 2018-11-23 DIAGNOSIS — J30.89 NON-SEASONAL ALLERGIC RHINITIS, UNSPECIFIED TRIGGER: Primary | ICD-10-CM

## 2018-11-23 DIAGNOSIS — Z01.30 BP CHECK: Primary | ICD-10-CM

## 2018-11-23 RX ORDER — FLUTICASONE PROPIONATE 50 MCG
1-2 SPRAY, SUSPENSION (ML) NASAL DAILY
Qty: 3 BOTTLE | Refills: 3 | Status: SHIPPED | OUTPATIENT
Start: 2018-11-23 | End: 2019-10-16

## 2018-12-17 ENCOUNTER — ALLIED HEALTH/NURSE VISIT (OUTPATIENT)
Dept: FAMILY MEDICINE | Facility: CLINIC | Age: 83
End: 2018-12-17

## 2018-12-17 VITALS
HEIGHT: 67 IN | BODY MASS INDEX: 24.68 KG/M2 | HEART RATE: 81 BPM | DIASTOLIC BLOOD PRESSURE: 71 MMHG | TEMPERATURE: 97.5 F | WEIGHT: 157.25 LBS | OXYGEN SATURATION: 100 % | SYSTOLIC BLOOD PRESSURE: 126 MMHG

## 2018-12-17 DIAGNOSIS — I10 BENIGN ESSENTIAL HYPERTENSION: Primary | ICD-10-CM

## 2018-12-17 ASSESSMENT — MIFFLIN-ST. JEOR: SCORE: 1377.03

## 2018-12-17 NOTE — NURSING NOTE
Patient for monthly blood pressure check and BP is 126/71 doing very good and without any symptom today.

## 2019-01-17 ENCOUNTER — COMMUNICATION - HEALTHEAST (OUTPATIENT)
Dept: FAMILY MEDICINE | Facility: CLINIC | Age: 84
End: 2019-01-17

## 2019-01-22 ENCOUNTER — COMMUNICATION - HEALTHEAST (OUTPATIENT)
Dept: FAMILY MEDICINE | Facility: CLINIC | Age: 84
End: 2019-01-22

## 2019-01-23 ENCOUNTER — ALLIED HEALTH/NURSE VISIT (OUTPATIENT)
Dept: FAMILY MEDICINE | Facility: CLINIC | Age: 84
End: 2019-01-23
Payer: COMMERCIAL

## 2019-01-23 VITALS
SYSTOLIC BLOOD PRESSURE: 146 MMHG | TEMPERATURE: 97.8 F | HEIGHT: 67 IN | DIASTOLIC BLOOD PRESSURE: 82 MMHG | BODY MASS INDEX: 24.96 KG/M2 | HEART RATE: 79 BPM | RESPIRATION RATE: 18 BRPM | WEIGHT: 159 LBS | OXYGEN SATURATION: 99 %

## 2019-01-23 DIAGNOSIS — Z01.30 BLOOD PRESSURE CHECK: Primary | ICD-10-CM

## 2019-01-23 DIAGNOSIS — I10 BENIGN ESSENTIAL HYPERTENSION: ICD-10-CM

## 2019-01-23 RX ORDER — LISINOPRIL 10 MG/1
10 TABLET ORAL DAILY
Qty: 90 TABLET | Refills: 1 | Status: SHIPPED | OUTPATIENT
Start: 2019-01-23 | End: 2019-07-17

## 2019-01-23 ASSESSMENT — MIFFLIN-ST. JEOR: SCORE: 1378.72

## 2019-01-23 NOTE — TELEPHONE ENCOUNTER
[facility] phone call message- patient requesting a refill:    Full Medication Name: lisinopril (PRINIVIL/ZESTRIL)     Dose: 10 MG tablet    Pharmacy confirmed as   [id number] - [place], [place] - [address] [place] [address]  Phone: [phone] Fax: [phone]  : Yes    Additional Comments: Patient is requesting a refill for the medicationlisted above. Patient states he went to pharmacy and they stated he would need to notify the clinic he needs a refill. Please call and advise     OK to leave a message on voice mail? Yes    Primary language: English      needed? No    Call taken on January 23, 2019 at 12:04 PM by Dior Navarro

## 2019-01-23 NOTE — NURSING NOTE
I checked Kevin Saldana BP today and it was elevated reading at 146/82 for the second reading. Consulted with Dr. Ayala and he stated to schedule pt to see Ceci Sullivan.    Awilda Esquivel, CMA

## 2019-01-23 NOTE — TELEPHONE ENCOUNTER
Message to physician: N/A    Date of last visit: 9/11/2018     Date of next visit if scheduled: Visit date not found    Last Comprehensive Metabolic Panel:  Sodium   Date Value Ref Range Status   06/05/2018 139.0 133.0 - 144.0 mmol/L Final     Potassium   Date Value Ref Range Status   06/05/2018 5.0 3.4 - 5.3 mmol/L Final     Chloride   Date Value Ref Range Status   06/05/2018 103.0 94.0 - 109.0 mmol/L Final     Carbon Dioxide   Date Value Ref Range Status   06/05/2018 29.0 20.0 - 32.0 mmol/L Final     Glucose   Date Value Ref Range Status   06/05/2018 96.0 60.0 - 109.0 mg/dL Final     Urea Nitrogen   Date Value Ref Range Status   06/05/2018 24.0 7.0 - 30.0 mg/dL Final     Creatinine   Date Value Ref Range Status   06/05/2018 1.5 0.8 - 1.5 mg/dL Final     Calcium   Date Value Ref Range Status   06/05/2018 9.6 8.5 - 10.4 mg/dL Final       BP Readings from Last 3 Encounters:   01/23/19 146/82   12/17/18 126/71   11/23/18 136/80       Lab Results   Component Value Date    A1C 5.7 12/01/2017                Please complete refill and CLOSE ENCOUNTER.  Closing the encounter signifies the refill is complete.

## 2019-01-24 ENCOUNTER — COMMUNICATION - HEALTHEAST (OUTPATIENT)
Dept: FAMILY MEDICINE | Facility: CLINIC | Age: 84
End: 2019-01-24

## 2019-01-30 ENCOUNTER — OFFICE VISIT (OUTPATIENT)
Dept: FAMILY MEDICINE | Facility: CLINIC | Age: 84
End: 2019-01-30
Payer: COMMERCIAL

## 2019-01-30 VITALS
WEIGHT: 161.8 LBS | BODY MASS INDEX: 26 KG/M2 | DIASTOLIC BLOOD PRESSURE: 79 MMHG | RESPIRATION RATE: 20 BRPM | OXYGEN SATURATION: 99 % | HEART RATE: 89 BPM | SYSTOLIC BLOOD PRESSURE: 141 MMHG | TEMPERATURE: 98.6 F | HEIGHT: 66 IN

## 2019-01-30 DIAGNOSIS — K59.00 CONSTIPATION, UNSPECIFIED CONSTIPATION TYPE: ICD-10-CM

## 2019-01-30 DIAGNOSIS — I10 BENIGN ESSENTIAL HYPERTENSION: ICD-10-CM

## 2019-01-30 RX ORDER — HYDROCHLOROTHIAZIDE 25 MG/1
25 TABLET ORAL DAILY
Qty: 90 TABLET | Refills: 3 | Status: SHIPPED | OUTPATIENT
Start: 2019-01-30 | End: 2020-01-22

## 2019-01-30 RX ORDER — POLYETHYLENE GLYCOL 3350 17 G/17G
17 POWDER, FOR SOLUTION ORAL DAILY
Qty: 510 G | Refills: 11 | Status: SHIPPED | OUTPATIENT
Start: 2019-01-30 | End: 2019-10-22

## 2019-01-30 ASSESSMENT — MIFFLIN-ST. JEOR: SCORE: 1376.67

## 2019-01-30 NOTE — PROGRESS NOTES
"       Subjective:   Kevin Saldana is a 83 year old year old male who presents to clinic today for the following health issues:    Chief Complaint   Patient presents with     RECHECK     BP     Medication Reconciliation     HTN:  Stable on metoprolol/lisinopril/HCTZ  Only has a few pills left of hydrochlorothiazide  Used to have dizziness after hurting back \"awhile ago.\" Was given pain pills that made him dizzy. Went to a vertigo clinic and they decreased his metoprolol which may have helped. Now feels his dizziness is controlled unless he turns his head quickly.  Taking metoprolol for \"rapid heart beat\"  No CP, SOB, headaches    Constipation:  Requesting miralax refill         PMHX:   PAST MEDICAL HISTORY:  Patient Active Problem List   Diagnosis     Health Care Home     S/P TURP     AK (actinic keratosis)     Balance problems     Stage 3 chronic kidney disease (H)     Flaccid neuropathic bladder, not elsewhere classified     Urine test positive for microalbuminuria - has not yet met time criteria for diagnosis      CURRENT MEDICATIONS:  Current Outpatient Medications   Medication Sig Dispense Refill     fluticasone (FLONASE) 50 MCG/ACT spray Spray 1-2 sprays into both nostrils daily 3 Bottle 3     hydrochlorothiazide (HYDRODIURIL) 25 MG tablet Take 1 tablet (25 mg) by mouth daily 90 tablet 3     lisinopril (PRINIVIL/ZESTRIL) 10 MG tablet Take 1 tablet (10 mg) by mouth daily 90 tablet 1     metoprolol tartrate (LOPRESSOR) 100 MG tablet Take 50 mg (half tablet) in the morning and 100 mg (1 full tablet) in the evening. 135 tablet 3     order for DME Equipment being ordered: FRANCISCO JAVIER stockings 1 Device 0     polyethylene glycol (MIRALAX/GLYCOLAX) powder Take 17 g by mouth daily 510 g 11     ALLERGIES:     Allergies   Allergen Reactions     Nkda [No Known Drug Allergies]           Objective:     Vitals:    01/30/19 1339 01/30/19 1341 01/30/19 1415   BP: 148/68 158/73 141/79   Pulse: 89     Resp: 20     Temp: 98.6  F (37 " " C)     SpO2: 99%     Weight: 73.4 kg (161 lb 12.8 oz)     Height: 1.676 m (5' 6\")       Body mass index is 26.12 kg/m .  No results found for this or any previous visit (from the past 24 hour(s)).    General: Alert, well-appearing male in NAD  Pulm: CTA BL, no tachypnea  CV: RRR, no murmur  Psych: Mood appropriate to visit content, full affect, rational thought content and process    Assessment and Plan:   1. Benign essential hypertension  Controlled with JNC8 goal of <150/90. Continue current regimen.   - hydrochlorothiazide (HYDRODIURIL) 25 MG tablet; Take 1 tablet (25 mg) by mouth daily  Dispense: 90 tablet; Refill: 3    2. Constipation, unspecified constipation type  Refill requested.   - polyethylene glycol (MIRALAX/GLYCOLAX) powder; Take 17 g by mouth daily  Dispense: 510 g; Refill: 11    Follow up: 6 months  Options for treatment and follow-up care were reviewed with the patient and/or guardian. Kevin Saldana and/or guardian engaged in the decision making process and verbalized understanding of the options discussed and agreed with the final plan.    Ceci Jaramillo DO  [facility] Family Medicine Resident    Precepted with: Dr. Huitron          "

## 2019-01-31 NOTE — PROGRESS NOTES
Preceptor Attestation:   Patient seen, evaluated and discussed with the resident. I have verified the content of the note, which accurately reflects my assessment of the patient and the plan of care.    Supervising Physician:Jelani Huitron MD    [facility]

## 2019-02-21 ENCOUNTER — ALLIED HEALTH/NURSE VISIT (OUTPATIENT)
Dept: FAMILY MEDICINE | Facility: CLINIC | Age: 84
End: 2019-02-21
Payer: COMMERCIAL

## 2019-02-21 VITALS
TEMPERATURE: 97.4 F | BODY MASS INDEX: 25.31 KG/M2 | WEIGHT: 161.25 LBS | HEART RATE: 78 BPM | DIASTOLIC BLOOD PRESSURE: 68 MMHG | HEIGHT: 67 IN | OXYGEN SATURATION: 100 % | SYSTOLIC BLOOD PRESSURE: 137 MMHG

## 2019-02-21 DIAGNOSIS — I10 BENIGN ESSENTIAL HYPERTENSION: Primary | ICD-10-CM

## 2019-02-21 ASSESSMENT — MIFFLIN-ST. JEOR: SCORE: 1395.18

## 2019-03-21 ENCOUNTER — ALLIED HEALTH/NURSE VISIT (OUTPATIENT)
Dept: FAMILY MEDICINE | Facility: CLINIC | Age: 84
End: 2019-03-21
Payer: COMMERCIAL

## 2019-03-21 VITALS — DIASTOLIC BLOOD PRESSURE: 71 MMHG | SYSTOLIC BLOOD PRESSURE: 132 MMHG

## 2019-03-21 DIAGNOSIS — Z13.9 SCREENING FOR CONDITION: Primary | ICD-10-CM

## 2019-04-22 ENCOUNTER — ALLIED HEALTH/NURSE VISIT (OUTPATIENT)
Dept: FAMILY MEDICINE | Facility: CLINIC | Age: 84
End: 2019-04-22
Payer: COMMERCIAL

## 2019-04-22 VITALS
DIASTOLIC BLOOD PRESSURE: 80 MMHG | OXYGEN SATURATION: 98 % | BODY MASS INDEX: 25.22 KG/M2 | HEART RATE: 78 BPM | TEMPERATURE: 97.4 F | SYSTOLIC BLOOD PRESSURE: 154 MMHG | HEIGHT: 68 IN | WEIGHT: 166.38 LBS

## 2019-04-22 DIAGNOSIS — I10 BENIGN ESSENTIAL HYPERTENSION: Primary | ICD-10-CM

## 2019-04-22 ASSESSMENT — MIFFLIN-ST. JEOR: SCORE: 1419.68

## 2019-04-22 NOTE — NURSING NOTE
Patient here for blood pressure check # 160/83 #2 154/80 Review this blood pressure result with DR Jaramillo and instructed patient to return to clinic in 2 months for blood pressure check per DR Jaramillo.

## 2019-05-20 ENCOUNTER — ALLIED HEALTH/NURSE VISIT (OUTPATIENT)
Dept: FAMILY MEDICINE | Facility: CLINIC | Age: 84
End: 2019-05-20
Payer: COMMERCIAL

## 2019-05-20 VITALS
TEMPERATURE: 97.5 F | HEIGHT: 68 IN | SYSTOLIC BLOOD PRESSURE: 133 MMHG | WEIGHT: 167.6 LBS | HEART RATE: 73 BPM | BODY MASS INDEX: 25.4 KG/M2 | OXYGEN SATURATION: 99 % | DIASTOLIC BLOOD PRESSURE: 77 MMHG

## 2019-05-20 DIAGNOSIS — I10 HTN (HYPERTENSION): Primary | ICD-10-CM

## 2019-05-20 ASSESSMENT — MIFFLIN-ST. JEOR: SCORE: 1426.48

## 2019-06-17 PROBLEM — J30.89 CHRONIC NON-SEASONAL ALLERGIC RHINITIS: Status: RESOLVED | Noted: 2017-11-20 | Resolved: 2018-12-17

## 2019-06-19 ENCOUNTER — OFFICE VISIT (OUTPATIENT)
Dept: FAMILY MEDICINE | Facility: CLINIC | Age: 84
End: 2019-06-19
Payer: COMMERCIAL

## 2019-06-19 VITALS
DIASTOLIC BLOOD PRESSURE: 67 MMHG | RESPIRATION RATE: 18 BRPM | TEMPERATURE: 97.6 F | WEIGHT: 168 LBS | BODY MASS INDEX: 25.7 KG/M2 | OXYGEN SATURATION: 98 % | SYSTOLIC BLOOD PRESSURE: 124 MMHG | HEART RATE: 75 BPM

## 2019-06-19 DIAGNOSIS — Z00.00 HEALTHCARE MAINTENANCE: ICD-10-CM

## 2019-06-19 DIAGNOSIS — R21 RASH: ICD-10-CM

## 2019-06-19 DIAGNOSIS — R79.89 ELEVATED SERUM CREATININE: ICD-10-CM

## 2019-06-19 DIAGNOSIS — I10 BENIGN ESSENTIAL HYPERTENSION: Primary | ICD-10-CM

## 2019-06-19 LAB
BUN SERPL-MCNC: 34 MG/DL (ref 7–30)
CALCIUM SERPL-MCNC: 9.8 MG/DL (ref 8.5–10.4)
CHLORIDE SERPLBLD-SCNC: 100 MMOL/L (ref 94–109)
CO2 SERPL-SCNC: 26 MMOL/L (ref 20–32)
CREAT SERPL-MCNC: 1.8 MG/DL (ref 0.8–1.5)
EGFR CALCULATED (BLACK REFERENCE): 46.6 ML/MIN
EGFR CALCULATED (NON BLACK REFERENCE): 38.5 ML/MIN
GLUCOSE SERPL-MCNC: 105 MG/DL (ref 60–109)
POTASSIUM SERPL-SCNC: 5.1 MMOL/L (ref 3.4–5.3)
SODIUM SERPL-SCNC: 141 MMOL/L (ref 133–144)

## 2019-06-19 RX ORDER — TRIAMCINOLONE ACETONIDE 1 MG/G
CREAM TOPICAL 2 TIMES DAILY PRN
Qty: 80 G | Refills: 0 | Status: SHIPPED | OUTPATIENT
Start: 2019-06-19 | End: 2019-10-22

## 2019-06-19 NOTE — PATIENT INSTRUCTIONS
Cetirizine (generic Zyrtec) at over the counter pharmacy for rash/itchiness as needed. Can ask pharmacist if having trouble finding it.

## 2019-06-19 NOTE — NURSING NOTE
6/17/2019 PCS Previsit Plan   DUE FOR:  Follow up for? No visit notes  Advance directive-packet given  Zoster imm at pharmacy  Wellness  Declined mychart 3/2019  DEREK Moe    6/18/19  BMP   Follow up HTN    Ceci Jaramillo

## 2019-06-19 NOTE — PROGRESS NOTES
"       Subjective:   Kevin Saldana is a 84 year old year old male who presents to clinic today for the following health issues:    Chief Complaint   Patient presents with     Follow Up     htn     Medication Reconciliation     completed     HTN:  Taking lisinopril, hydrochlorothiazide, metoprolol without issue. No sxs of dizziness, lightheadness, CP, palpitations, SOB.     Rash:  Intermittent rash that comes on randomly, often at night. Happens every 2-3 days. Described as \"tiny pustules\" that can be on his extremities and trunk. The rash lasts approximately 30 min and then self resolves. Has been using hydrocortisone cream with little relief. Rubbing alcohol helps the pruritus. No new detergents, colognes, environmental exposures.          PMHX:   PAST MEDICAL HISTORY:  Patient Active Problem List   Diagnosis     Health Care Home     S/P TURP     AK (actinic keratosis)     Balance problems     Stage 3 chronic kidney disease (H)     Flaccid neuropathic bladder, not elsewhere classified     Urine test positive for microalbuminuria - has not yet met time criteria for diagnosis      CURRENT MEDICATIONS:  Current Outpatient Medications   Medication Sig Dispense Refill     fluticasone (FLONASE) 50 MCG/ACT spray Spray 1-2 sprays into both nostrils daily 3 Bottle 3     hydrochlorothiazide (HYDRODIURIL) 25 MG tablet Take 1 tablet (25 mg) by mouth daily 90 tablet 3     lisinopril (PRINIVIL/ZESTRIL) 10 MG tablet Take 1 tablet (10 mg) by mouth daily 90 tablet 1     metoprolol tartrate (LOPRESSOR) 100 MG tablet Take 50 mg (half tablet) in the morning and 100 mg (1 full tablet) in the evening. 135 tablet 3     order for DME Equipment being ordered: FRANCISCO JAVIER stockings 1 Device 0     polyethylene glycol (MIRALAX/GLYCOLAX) powder Take 17 g by mouth daily 510 g 11     ALLERGIES:     Allergies   Allergen Reactions     Nkda [No Known Drug Allergies]           Objective:     Vitals:    06/19/19 0908   BP: 124/67   Pulse: 75   Resp: 18 "   Temp: 97.6  F (36.4  C)   TempSrc: Oral   SpO2: 98%   Weight: 76.2 kg (168 lb)     Body mass index is 25.7 kg/m .  No results found for this or any previous visit (from the past 24 hour(s)).    General: Alert, well-appearing male in NAD  Pulm: CTA BL, no tachypnea  CV: RRR, no murmur  Psych: Mood appropriate to visit content, full affect, rational thought content and process  Skin: warm, well perfused, no rash today    Last Comprehensive Metabolic Panel:  Sodium   Date Value Ref Range Status   06/19/2019 141.0 133.0 - 144.0 mmol/L Final     Potassium   Date Value Ref Range Status   06/19/2019 5.1 3.4 - 5.3 mmol/L Final     Chloride   Date Value Ref Range Status   06/19/2019 100.0 94.0 - 109.0 mmol/L Final     Carbon Dioxide   Date Value Ref Range Status   06/19/2019 26.0 20.0 - 32.0 mmol/L Final     Glucose   Date Value Ref Range Status   06/19/2019 105.0 60.0 - 109.0 mg/dL Final     Urea Nitrogen   Date Value Ref Range Status   06/19/2019 34.0 (H) 7.0 - 30.0 mg/dL Final     Creatinine   Date Value Ref Range Status   06/19/2019 1.8 (H) 0.8 - 1.5 mg/dL Final     Calcium   Date Value Ref Range Status   06/19/2019 9.8 8.5 - 10.4 mg/dL Final       Assessment and Plan:   1. Benign essential hypertension  BP controlled on current regimen. BMP obtained today showed elevated creatinine (discussed below).   - Basic Metabolic Panel ([facility])  - Results < 1 hr    2. Healthcare maintenance  Advanced directive form discussed and given to patient. Asked him to fill out and return copy.     3. Rash  Patient describes a sporadic, papular rash that often comes on at night and resolves quickly (~30 min). No obvious provoking factors. Discussed trialing OTC cetirizine. Triamcinolone cream also provided (discussed avoiding longterm use).   - triamcinolone (KENALOG) 0.1 % external cream; Apply topically 2 times daily as needed for irritation  Dispense: 80 g; Refill: 0    4. Elevated serum creatinine  Routine blood work obtained today  and significant for an elevated creatinine of 1.8 (Cr 1.5 last year). Called patient to discuss results, he reports little to no water intake and that he primarily drinks coffee. He denies NSAID use. Asked him to increase his oral hydration and return in 3 weeks for a repeat BMP. If remains elevated, would obtain urine micro, urine albumin/creatine ratio, and consider decreasing lisinopril dose.     Follow up: 3 weeks  Options for treatment and follow-up care were reviewed with the patient and/or guardian. Kevin Saldana and/or guardian engaged in the decision making process and verbalized understanding of the options discussed and agreed with the final plan.    Ceci Jaramillo DO  [facility] Medicine Resident    Precepted with: Ron Ayala MD

## 2019-07-17 ENCOUNTER — COMMUNICATION - HEALTHEAST (OUTPATIENT)
Dept: FAMILY MEDICINE | Facility: CLINIC | Age: 84
End: 2019-07-17

## 2019-07-17 DIAGNOSIS — I10 BENIGN ESSENTIAL HYPERTENSION: ICD-10-CM

## 2019-07-17 NOTE — PROGRESS NOTES
Preceptor Attestation:  Patient's case reviewed and discussed with Ceci Jaramillo DO resident and I evaluated the patient. I agree with written assessment and plan of care.  Supervising Physician:  Rustam Ayala MD MD  [facility]

## 2019-07-18 RX ORDER — LISINOPRIL 10 MG/1
10 TABLET ORAL DAILY
Qty: 90 TABLET | Refills: 3 | Status: SHIPPED | OUTPATIENT
Start: 2019-07-18 | End: 2020-01-22

## 2019-07-23 ENCOUNTER — OFFICE VISIT (OUTPATIENT)
Dept: FAMILY MEDICINE | Facility: CLINIC | Age: 84
End: 2019-07-23
Payer: COMMERCIAL

## 2019-07-23 VITALS
DIASTOLIC BLOOD PRESSURE: 82 MMHG | TEMPERATURE: 97.5 F | OXYGEN SATURATION: 98 % | WEIGHT: 166.4 LBS | BODY MASS INDEX: 25.22 KG/M2 | HEIGHT: 68 IN | HEART RATE: 71 BPM | RESPIRATION RATE: 16 BRPM | SYSTOLIC BLOOD PRESSURE: 138 MMHG

## 2019-07-23 DIAGNOSIS — Z13.9 SCREENING FOR CONDITION: ICD-10-CM

## 2019-07-23 DIAGNOSIS — I95.1 ORTHOSTASIS: ICD-10-CM

## 2019-07-23 DIAGNOSIS — N18.30 STAGE 3 CHRONIC KIDNEY DISEASE (H): Primary | ICD-10-CM

## 2019-07-23 DIAGNOSIS — I10 BENIGN ESSENTIAL HYPERTENSION: ICD-10-CM

## 2019-07-23 LAB
BUN SERPL-MCNC: 32 MG/DL (ref 7–30)
CALCIUM SERPL-MCNC: 9.8 MG/DL (ref 8.5–10.4)
CHLORIDE SERPLBLD-SCNC: 95 MMOL/L (ref 94–109)
CHOLEST SERPL-MCNC: 157 MG/DL
CHOLEST/HDLC SERPL: 3.9 RATIO
CO2 SERPL-SCNC: 26 MMOL/L (ref 20–32)
CREAT SERPL-MCNC: 1.6 MG/DL (ref 0.8–1.5)
EGFR CALCULATED (BLACK REFERENCE): 53.4 ML/MIN
EGFR CALCULATED (NON BLACK REFERENCE): 44.1 ML/MIN
GLUCOSE SERPL-MCNC: 99 MG/DL (ref 60–109)
HDLC SERPL-MCNC: 40 MG/DL
LDLC SERPL CALC-MCNC: 95 MG/DL (ref 0–99)
POTASSIUM SERPL-SCNC: 4.4 MMOL/L (ref 3.4–5.3)
SODIUM SERPL-SCNC: 136 MMOL/L (ref 133–144)
TRIGL SERPL-MCNC: 105 MG/DL
VLDL-CHOLESTEROL: 21 MG/DL (ref 7–32)

## 2019-07-23 RX ORDER — INFLUENZA A VIRUS A/VICTORIA/4897/2022 IVR-238 (H1N1) ANTIGEN (FORMALDEHYDE INACTIVATED), INFLUENZA A VIRUS A/CALIFORNIA/122/2022 SAN-022 (H3N2) ANTIGEN (FORMALDEHYDE INACTIVATED), AND INFLUENZA B VIRUS B/MICHIGAN/01/2021 ANTIGEN (FORMALDEHYDE INACTIVATED) 60; 60; 60 UG/.5ML; UG/.5ML; UG/.5ML
INJECTION, SUSPENSION INTRAMUSCULAR
Refills: 0 | COMMUNITY
Start: 2018-09-11 | End: 2019-10-22

## 2019-07-23 RX ORDER — ZOSTER VACCINE RECOMBINANT, ADJUVANTED 50 MCG/0.5
KIT INTRAMUSCULAR
Refills: 0 | COMMUNITY
Start: 2019-01-30 | End: 2019-10-22

## 2019-07-23 RX ORDER — METOPROLOL TARTRATE 100 MG
TABLET ORAL
Qty: 135 TABLET | Refills: 3 | Status: SHIPPED | OUTPATIENT
Start: 2019-07-23 | End: 2020-01-22

## 2019-07-23 ASSESSMENT — MIFFLIN-ST. JEOR: SCORE: 1421.67

## 2019-07-23 NOTE — PROGRESS NOTES
"History   Kevin Saldana is a 84 year old male presenting for:  Blood Draw (MD wanted pt to come in for blood test. ); Medication Reconciliation (attention needed. outside meds for MD to reconcile ); and Refill Request (metoprolol, and DME for hayder stockings. )    HTN   BP Readings from Last 3 Encounters:   07/23/19 138/82   06/19/19 124/67   05/20/19 133/77   - home monitoring: no  - compliance: good; took meds this morning  - denies headaches, blurry vision, LE edema  - does have balance problems for a couple of years. Uses cane so no falls. Went to balance center in past and was given exercises. No improvement with changes in metoprolol or bp meds.   - does get leg swelling at times but none recently as long as wearing compression stockings.   - drinking 5 large cups of water per day, up from none before - had only been drinking coffee other than to swallow pills. No difficulty with frequent urination and happy to continue drinking this amount of water or more if necessary. Doesn't really want to change bp meds today. Leg swelling not worse since adding more water.   - wondering what cholesterol is.    The patient speaks English, so no  was used for this visit.   Medical and social history and medications reviewed with patient.   Exam   /82   Pulse 71   Temp 97.5  F (36.4  C) (Oral)   Resp 16   Ht 1.723 m (5' 7.84\")   Wt 75.5 kg (166 lb 6.4 oz)   SpO2 98%   BMI 25.42 kg/m    Gen: NAD  HEENT: oral mucosa moist,   Card: RRR, no murmurs  Resp: good breath sounds, no wheezing or crackles  Ext: no LE edema  Skin: no rashes on exposed skin  Neuro: mentation intact, speech normal  Psych: mood normal, affect congruent  Medical Decision-Making     1. Stage 3 chronic kidney disease (H)  Improved creatinine since last visit, more consistent with slow progression of ckd3 likely related to HTN requriing 3 meds. Normal electrolytes on current regimen. Will keep ACE at current low dose for renal " protection as long as he is tolerating increased oral hydration.   - Basic Metabolic Panel ([facility]) - Results < 1 hr    2. Benign essential hypertension  Discussed okay to allow for higher bp and back off on meds. He is not interested in changing meds today. If decreasing, would likely decrease dose of hydrochlorothiazide first. Currently feeling well with increased water intake and comes in monthly for bp monitoring her. Recommended clinic visit in 3 months to reevaluate decreasing bp meds.   - metoprolol tartrate (LOPRESSOR) 100 MG tablet; Take 50 mg (half tablet) in the morning and 100 mg (1 full tablet) in the evening.  Dispense: 135 tablet; Refill: 3    3. Orthostasis  Discussed at length. Balance issues for several years, not worse recently. No falls. He reports no improovement in past with decreasing bp meds and doesn't want to try this. Based on his description, sounds like he was treated for BPPV.   - order for DME; Equipment being ordered: FRANCISCO JAVIER stockings  Dispense: 1 Device; Refill: 0    4. Screening for condition  Reviewed lipid panel from 2 years ago. He is interested in updated results and is fasting this morning. Added on to prior labs. Not on statin currently.   - Lipid Panel ([facility]) - Results < 1 hr    Follow up: 3 mo for bp. Sooner prn if orthostasis    Alexa Sabillon MD, MPH  [facility] Medicine Resident     Precepted patient with Viviana Tolentino DO    Options for treatment and follow-up care were reviewed with the patient and/or guardian. Kevin Saldana and/or guardian engaged in the decision making process and verbalized understanding of the options discussed and agreed with the final plan.

## 2019-08-21 ENCOUNTER — ALLIED HEALTH/NURSE VISIT (OUTPATIENT)
Dept: FAMILY MEDICINE | Facility: CLINIC | Age: 84
End: 2019-08-21
Payer: COMMERCIAL

## 2019-08-21 VITALS
BODY MASS INDEX: 26.34 KG/M2 | WEIGHT: 173.8 LBS | RESPIRATION RATE: 16 BRPM | SYSTOLIC BLOOD PRESSURE: 137 MMHG | DIASTOLIC BLOOD PRESSURE: 69 MMHG | HEART RATE: 73 BPM | OXYGEN SATURATION: 100 % | TEMPERATURE: 98 F | HEIGHT: 68 IN

## 2019-08-21 DIAGNOSIS — Z01.30 BLOOD PRESSURE CHECK: Primary | ICD-10-CM

## 2019-08-21 ASSESSMENT — MIFFLIN-ST. JEOR: SCORE: 1453.36

## 2019-09-20 ENCOUNTER — ALLIED HEALTH/NURSE VISIT (OUTPATIENT)
Dept: FAMILY MEDICINE | Facility: CLINIC | Age: 84
End: 2019-09-20
Payer: COMMERCIAL

## 2019-09-20 VITALS
DIASTOLIC BLOOD PRESSURE: 77 MMHG | HEART RATE: 75 BPM | WEIGHT: 164 LBS | OXYGEN SATURATION: 97 % | SYSTOLIC BLOOD PRESSURE: 131 MMHG | BODY MASS INDEX: 24.86 KG/M2 | HEIGHT: 68 IN | TEMPERATURE: 97.3 F

## 2019-09-20 DIAGNOSIS — I10 BENIGN ESSENTIAL HYPERTENSION: Primary | ICD-10-CM

## 2019-09-20 ASSESSMENT — MIFFLIN-ST. JEOR: SCORE: 1408.91

## 2019-10-16 DIAGNOSIS — J30.89 NON-SEASONAL ALLERGIC RHINITIS, UNSPECIFIED TRIGGER: ICD-10-CM

## 2019-10-17 RX ORDER — FLUTICASONE PROPIONATE 50 MCG
1-2 SPRAY, SUSPENSION (ML) NASAL DAILY
Qty: 15.8 ML | Refills: 1 | Status: SHIPPED | OUTPATIENT
Start: 2019-10-17 | End: 2020-01-19

## 2019-10-21 NOTE — PROGRESS NOTES
"       Subjective:   Keivn Saldana is a 84 year old year old male who presents to clinic today for the following health issues:    Chief Complaint   Patient presents with     RECHECK     BP     Medication Reconciliation     Needs attention, not using the cream       HTN:  hydrochlorothiazide 25 mg daily  Lisinopril 10 g daily  Metoprolol 50mg qAM, 100 mg at bedtime  No symptoms of hypotension  No CP, SOB, leg swelling    Constipation:  Increased to twice daily due to constipation which has been helpful for him. Now stooling once per day.          PMHX:   PAST MEDICAL HISTORY:  Patient Active Problem List   Diagnosis     Health Care Home     S/P TURP     AK (actinic keratosis)     Balance problems     Stage 3 chronic kidney disease (H)     Flaccid neuropathic bladder, not elsewhere classified     Urine test positive for microalbuminuria - has not yet met time criteria for diagnosis      Prediabetes     CURRENT MEDICATIONS:  Current Outpatient Medications   Medication Sig Dispense Refill     fluticasone (FLONASE) 50 MCG/ACT nasal spray Spray 1-2 sprays into both nostrils daily 15.8 mL 1     hydrochlorothiazide (HYDRODIURIL) 25 MG tablet Take 1 tablet (25 mg) by mouth daily 90 tablet 3     lisinopril (PRINIVIL/ZESTRIL) 10 MG tablet Take 1 tablet (10 mg) by mouth daily 90 tablet 3     metoprolol tartrate (LOPRESSOR) 100 MG tablet Take 50 mg (half tablet) in the morning and 100 mg (1 full tablet) in the evening. 135 tablet 3     order for DME Equipment being ordered: FRANCISCO JAVIER stockings 1 Device 0     polyethylene glycol (MIRALAX/GLYCOLAX) powder Take 17 g by mouth 2 times daily 850 g 11     ALLERGIES:     Allergies   Allergen Reactions     Nkda [No Known Drug Allergies]           Objective:     Vitals:    10/22/19 0915 10/22/19 0920   BP:  134/68   Pulse:  82   Resp:  18   Temp:  97.6  F (36.4  C)   SpO2:  98%   Weight:  74.2 kg (163 lb 9.6 oz)   Height: 1.721 m (5' 7.75\") 1.715 m (5' 7.5\")     Body mass index is 25.25 " kg/m .  No results found for this or any previous visit (from the past 24 hour(s)).    General: Alert, well-appearing male in NAD  Pulm: CTA BL, no tachypnea  CV: RRR, no murmur  Psych: Mood appropriate to visit content, full affect, rational thought content and process    Assessment and Plan:   1. Essential hypertension  BP controlled on three drug regimen. Will continue without change.     2. Constipation, unspecified constipation type  - polyethylene glycol (MIRALAX/GLYCOLAX) powder; Take 17 g by mouth 2 times daily  Dispense: 850 g; Refill: 11    Follow up: 6 months for BP and CKD   Options for treatment and follow-up care were reviewed with the patient and/or guardian. Kevin Saldana and/or guardian engaged in the decision making process and verbalized understanding of the options discussed and agreed with the final plan.    Ceci Jaramillo DO  [facility] Family Medicine Resident    Precepted with: Randy Cavazos MD

## 2019-10-22 ENCOUNTER — OFFICE VISIT (OUTPATIENT)
Dept: FAMILY MEDICINE | Facility: CLINIC | Age: 84
End: 2019-10-22
Payer: COMMERCIAL

## 2019-10-22 VITALS
DIASTOLIC BLOOD PRESSURE: 68 MMHG | OXYGEN SATURATION: 98 % | HEART RATE: 82 BPM | HEIGHT: 68 IN | WEIGHT: 163.6 LBS | BODY MASS INDEX: 24.8 KG/M2 | SYSTOLIC BLOOD PRESSURE: 134 MMHG | TEMPERATURE: 97.6 F | RESPIRATION RATE: 18 BRPM

## 2019-10-22 DIAGNOSIS — K59.00 CONSTIPATION, UNSPECIFIED CONSTIPATION TYPE: ICD-10-CM

## 2019-10-22 DIAGNOSIS — I10 ESSENTIAL HYPERTENSION: Primary | ICD-10-CM

## 2019-10-22 RX ORDER — POLYETHYLENE GLYCOL 3350 17 G/17G
17 POWDER, FOR SOLUTION ORAL 2 TIMES DAILY
Qty: 850 G | Refills: 11 | Status: SHIPPED | OUTPATIENT
Start: 2019-10-22 | End: 2020-03-30

## 2019-10-22 ASSESSMENT — MIFFLIN-ST. JEOR: SCORE: 1403.64

## 2019-10-22 NOTE — PROGRESS NOTES
Preceptor Attestation:   Patient seen, evaluated and discussed with the resident. I have verified the content of the note, which accurately reflects my assessment of the patient and the plan of care.  Supervising Physician:Randy Cavazos MD  [facility]

## 2019-11-04 ENCOUNTER — MEDICAL CORRESPONDENCE (OUTPATIENT)
Dept: HEALTH INFORMATION MANAGEMENT | Facility: CLINIC | Age: 84
End: 2019-11-04

## 2019-11-29 ENCOUNTER — ALLIED HEALTH/NURSE VISIT (OUTPATIENT)
Dept: FAMILY MEDICINE | Facility: CLINIC | Age: 84
End: 2019-11-29
Payer: COMMERCIAL

## 2019-11-29 VITALS
WEIGHT: 162.4 LBS | TEMPERATURE: 97.4 F | DIASTOLIC BLOOD PRESSURE: 67 MMHG | OXYGEN SATURATION: 100 % | SYSTOLIC BLOOD PRESSURE: 135 MMHG | HEIGHT: 68 IN | HEART RATE: 84 BPM | BODY MASS INDEX: 24.61 KG/M2 | RESPIRATION RATE: 24 BRPM

## 2019-11-29 DIAGNOSIS — Z01.30 BP CHECK: Primary | ICD-10-CM

## 2019-11-29 ASSESSMENT — MIFFLIN-ST. JEOR: SCORE: 1399.76

## 2019-12-20 ENCOUNTER — ALLIED HEALTH/NURSE VISIT (OUTPATIENT)
Dept: FAMILY MEDICINE | Facility: CLINIC | Age: 84
End: 2019-12-20
Payer: COMMERCIAL

## 2019-12-20 VITALS
DIASTOLIC BLOOD PRESSURE: 65 MMHG | WEIGHT: 165 LBS | OXYGEN SATURATION: 100 % | RESPIRATION RATE: 16 BRPM | HEART RATE: 75 BPM | HEIGHT: 68 IN | SYSTOLIC BLOOD PRESSURE: 131 MMHG | BODY MASS INDEX: 25.01 KG/M2

## 2019-12-20 DIAGNOSIS — I10 ESSENTIAL HYPERTENSION: Primary | ICD-10-CM

## 2019-12-20 ASSESSMENT — MIFFLIN-ST. JEOR: SCORE: 1410

## 2020-01-17 DIAGNOSIS — J30.89 NON-SEASONAL ALLERGIC RHINITIS, UNSPECIFIED TRIGGER: ICD-10-CM

## 2020-01-17 NOTE — TELEPHONE ENCOUNTER
Message to physician:     Date of last visit: 10/22/2019     Date of next visit if scheduled: Visit date not found       Last Comprehensive Metabolic Panel:  Sodium   Date Value Ref Range Status   07/23/2019 136.0 133.0 - 144.0 mmol/L Final     Potassium   Date Value Ref Range Status   07/23/2019 4.4 3.4 - 5.3 mmol/L Final     Chloride   Date Value Ref Range Status   07/23/2019 95.0 94.0 - 109.0 mmol/L Final     Carbon Dioxide   Date Value Ref Range Status   07/23/2019 26.0 20.0 - 32.0 mmol/L Final     Glucose   Date Value Ref Range Status   07/23/2019 99.0 60.0 - 109.0 mg/dL Final     Urea Nitrogen   Date Value Ref Range Status   07/23/2019 32.0 (H) 7.0 - 30.0 mg/dL Final     Creatinine   Date Value Ref Range Status   07/23/2019 1.6 (H) 0.8 - 1.5 mg/dL Final     Calcium   Date Value Ref Range Status   07/23/2019 9.8 8.5 - 10.4 mg/dL Final       BP Readings from Last 3 Encounters:   12/20/19 131/65   11/29/19 135/67   10/22/19 134/68       Lab Results   Component Value Date    A1C 5.7 12/01/2017                Please complete refill and CLOSE ENCOUNTER.  Closing the encounter signifies the refill is complete.

## 2020-01-19 RX ORDER — FLUTICASONE PROPIONATE 50 MCG
1-2 SPRAY, SUSPENSION (ML) NASAL DAILY
Qty: 15.8 ML | Refills: 1 | Status: SHIPPED | OUTPATIENT
Start: 2020-01-19 | End: 2020-03-30

## 2020-01-21 NOTE — PROGRESS NOTES
HPI:       Kevin Saldana is a 84 year old who presents today for follow up of hypertension.    1.  Hypertension: Patient notes no problems with current medications. Patient is currently taking his medications as listed below.  Patient is not checking blood pressures regularly outside of the clinic.    Patient is exercising outside of work/usual daily activities - [place] Patient is not following a low salt diet. he denies chest pain, lightheadedness, syncope, rashes/swelling/urticaria, cough, shortness of breath, headache, nausea, or urinary frequency.       Medications: hydrochlorothiazide 25 mg daily, lisinopril 10 mg daily, metoprolol 50 mg in the AM and 100 mg in the PM    Smoking:No    Aspirin (recomemended for ASCVD >10%): Yes    Statin: no     Health maintenance reviewed and appropriate orders placed?  Yes    BP Readings from Last 3 Encounters:   01/22/20 (!) 142/70   12/20/19 131/65   11/29/19 135/67     Wt Readings from Last 3 Encounters:   01/22/20 72.6 kg (160 lb)   12/20/19 74.8 kg (165 lb)   11/29/19 73.7 kg (162 lb 6.4 oz)          PMHX:     Patient Active Problem List   Diagnosis     Health Care Home     S/P TURP     AK (actinic keratosis)     Benign essential hypertension     Balance problems     Stage 3 chronic kidney disease (H)     Flaccid neuropathic bladder, not elsewhere classified     Urine test positive for microalbuminuria - has not yet met time criteria for diagnosis      Prediabetes     Current Outpatient Medications   Medication Sig Dispense Refill     aspirin (ASA) 81 MG tablet Take 1 tablet (81 mg) by mouth daily       fluticasone (FLONASE) 50 MCG/ACT nasal spray Spray 1-2 sprays into both nostrils daily 15.8 mL 1     hydrochlorothiazide (HYDRODIURIL) 25 MG tablet Take 1 tablet (25 mg) by mouth daily 90 tablet 3     lisinopril (PRINIVIL/ZESTRIL) 10 MG tablet Take 1 tablet (10 mg) by mouth daily 90 tablet 3      "metoprolol tartrate (LOPRESSOR) 100 MG tablet Take 50 mg (half tablet) in the morning and 100 mg (1 full tablet) in the evening. 135 tablet 3     order for DME Equipment being ordered: FRANCISCO JAVIER stockings 1 Device 0     polyethylene glycol (MIRALAX/GLYCOLAX) powder Take 17 g by mouth 2 times daily 850 g 11     Allergies   Allergen Reactions     Nkda [No Known Drug Allergies]           Review of Systems:     C: NEGATIVE for fatigue, unexpected change in weight  E: NEGATIVE for acute vision problems or changes  R: NEGATIVE for significant cough or shortness of breath  CV: NEGATIVE for chest pain, palpitations or new or worsening peripheral edema  P: NEGATIVE for changes in mood or affect          Physical Exam:     Vitals: BP (!) 142/70   Pulse 77   Temp 97.5  F (36.4  C) (Oral)   Resp 16   Ht 1.73 m (5' 8.11\")   Wt 72.6 kg (160 lb)   SpO2 99%   BMI 24.25 kg/m    BMIE= Body mass index is 24.25 kg/m .  GENERAL APPEARANCE: alert and no acute distress  PSYCH: mentation appears normal and affect normal/bright  RESP: lungs clear to auscultation - no rales, rhonchi or wheezes  CV: regular rate and rhythm, normal S1 S2, no S3 or S4 and no murmur, click or rub -  EXT: no cyanosis or edema in lower extremities  SKIN: no venous stasis changes  NEURO: Normal strength and tone, sensory exam grossly normal, mentation intact and speech normal    Assessment and Plan     Kevin Saldana is a 84 year old male here for evaluation of:     1. Benign essential hypertension  BP at goal for age based on JNC-8 guidelines. Emphasized diet and exercise. Medication changes today: None. Discussed risks and benefits of continuing aspirin; shared decision making took place and patient decided to continue taking daily aspirin. Refills sent. Follow up 3 months with MD.   - aspirin (ASA) 81 MG tablet; Take 1 tablet (81 mg) by mouth daily  - hydrochlorothiazide (HYDRODIURIL) 25 MG tablet; Take 1 tablet (25 mg) by mouth daily  Dispense: 90 tablet; " Refill: 3  - lisinopril (PRINIVIL/ZESTRIL) 10 MG tablet; Take 1 tablet (10 mg) by mouth daily  Dispense: 90 tablet; Refill: 3  - metoprolol tartrate (LOPRESSOR) 100 MG tablet; Take 50 mg (half tablet) in the morning and 100 mg (1 full tablet) in the evening.  Dispense: 135 tablet; Refill: 3    Options for treatment and follow-up care were reviewed with the patient and/or guardian. Kevin Saldana and/or guardian engaged in the decision making process and verbalized understanding of the options discussed and agreed with the final plan.    Kinza Franco MD  [facility] Medicine Resident  P: [phone]    Precepted today with: Rustam yAala MD

## 2020-01-22 ENCOUNTER — OFFICE VISIT (OUTPATIENT)
Dept: FAMILY MEDICINE | Facility: CLINIC | Age: 85
End: 2020-01-22
Payer: COMMERCIAL

## 2020-01-22 VITALS
DIASTOLIC BLOOD PRESSURE: 70 MMHG | RESPIRATION RATE: 16 BRPM | BODY MASS INDEX: 24.25 KG/M2 | WEIGHT: 160 LBS | OXYGEN SATURATION: 99 % | TEMPERATURE: 97.5 F | HEART RATE: 77 BPM | HEIGHT: 68 IN | SYSTOLIC BLOOD PRESSURE: 142 MMHG

## 2020-01-22 DIAGNOSIS — I10 BENIGN ESSENTIAL HYPERTENSION: ICD-10-CM

## 2020-01-22 DIAGNOSIS — I10 BENIGN ESSENTIAL HYPERTENSION: Primary | ICD-10-CM

## 2020-01-22 RX ORDER — HYDROCHLOROTHIAZIDE 25 MG/1
25 TABLET ORAL DAILY
Qty: 90 TABLET | Refills: 3 | Status: SHIPPED | OUTPATIENT
Start: 2020-01-22 | End: 2020-01-22

## 2020-01-22 RX ORDER — METOPROLOL TARTRATE 100 MG
TABLET ORAL
Qty: 135 TABLET | Refills: 3 | Status: SHIPPED | OUTPATIENT
Start: 2020-01-22 | End: 2020-03-30

## 2020-01-22 RX ORDER — LISINOPRIL 10 MG/1
10 TABLET ORAL DAILY
Qty: 90 TABLET | Refills: 3 | Status: SHIPPED | OUTPATIENT
Start: 2020-01-22 | End: 2020-03-30

## 2020-01-22 ASSESSMENT — MIFFLIN-ST. JEOR: SCORE: 1397.01

## 2020-01-22 NOTE — TELEPHONE ENCOUNTER
[facility] phone call message- patient requesting a refill:    Full Medication Name: hydrochlorothiazide (HYDRODIURIL)     Dose: 25 MG tablet    Pharmacy confirmed as   [id number] - [place], [place] - [address] [place] [address]  Phone: [phone] Fax: [phone]  : Yes    Additional Comments: Pt came in requesting for a refill for med listed above and needs more as he is running out of the meds and is heading to the pharmacy now.      OK to leave a message on voice mail? Yes    Primary language: English      needed? No    Call taken on January 22, 2020 at 11:37 AM by Shaw Chowdhury

## 2020-01-23 RX ORDER — HYDROCHLOROTHIAZIDE 25 MG/1
25 TABLET ORAL DAILY
Qty: 90 TABLET | Refills: 3 | Status: SHIPPED | OUTPATIENT
Start: 2020-01-23 | End: 2020-03-30

## 2020-01-29 NOTE — PROGRESS NOTES
Preceptor Attestation:  Patient's case reviewed and discussed with Kinza Franco MD resident and I evaluated the patient. I agree with written assessment and plan of care.  Supervising Physician:  Rustam Ayala MD, MD ANN  [facility]

## 2020-02-25 ENCOUNTER — ALLIED HEALTH/NURSE VISIT (OUTPATIENT)
Dept: FAMILY MEDICINE | Facility: CLINIC | Age: 85
End: 2020-02-25
Payer: COMMERCIAL

## 2020-02-25 VITALS
BODY MASS INDEX: 24.07 KG/M2 | RESPIRATION RATE: 18 BRPM | TEMPERATURE: 97.9 F | WEIGHT: 158.8 LBS | DIASTOLIC BLOOD PRESSURE: 75 MMHG | SYSTOLIC BLOOD PRESSURE: 129 MMHG | HEIGHT: 68 IN | OXYGEN SATURATION: 99 % | HEART RATE: 76 BPM

## 2020-02-25 DIAGNOSIS — Z01.30 BLOOD PRESSURE CHECK: Primary | ICD-10-CM

## 2020-02-25 ASSESSMENT — MIFFLIN-ST. JEOR: SCORE: 1385.32

## 2020-03-30 ENCOUNTER — VIRTUAL VISIT (OUTPATIENT)
Dept: FAMILY MEDICINE | Facility: CLINIC | Age: 85
End: 2020-03-30
Payer: COMMERCIAL

## 2020-03-30 VITALS — WEIGHT: 159 LBS | HEIGHT: 69 IN | BODY MASS INDEX: 23.55 KG/M2

## 2020-03-30 DIAGNOSIS — I10 BENIGN ESSENTIAL HYPERTENSION: ICD-10-CM

## 2020-03-30 DIAGNOSIS — K59.00 CONSTIPATION, UNSPECIFIED CONSTIPATION TYPE: ICD-10-CM

## 2020-03-30 RX ORDER — HYDROCHLOROTHIAZIDE 25 MG/1
25 TABLET ORAL DAILY
Qty: 90 TABLET | Refills: 3 | Status: SHIPPED | OUTPATIENT
Start: 2020-03-30 | End: 2021-03-29

## 2020-03-30 RX ORDER — POLYETHYLENE GLYCOL 3350 17 G/17G
17 POWDER, FOR SOLUTION ORAL 2 TIMES DAILY
Qty: 850 G | Refills: 11 | Status: SHIPPED | OUTPATIENT
Start: 2020-03-30 | End: 2020-04-15

## 2020-03-30 RX ORDER — METOPROLOL TARTRATE 100 MG
TABLET ORAL
Qty: 180 TABLET | Refills: 3 | Status: SHIPPED | OUTPATIENT
Start: 2020-03-30 | End: 2021-04-07

## 2020-03-30 RX ORDER — LISINOPRIL 10 MG/1
10 TABLET ORAL DAILY
Qty: 90 TABLET | Refills: 3 | Status: SHIPPED | OUTPATIENT
Start: 2020-03-30 | End: 2021-04-05

## 2020-03-30 ASSESSMENT — MIFFLIN-ST. JEOR: SCORE: 1406.6

## 2020-03-30 NOTE — PROGRESS NOTES
"Family Medicine Telephone Visit Note         Telephone Visit Consent   Patient was verbally read the following and verbal consent was obtained.  \"I understand that I may revoke this request for a phone visit at any time.  This consent will automatically  3 months from the signed date and time.\"    Name person giving consent:  Patient   Date verbal consent given:  3/30/2020  Time verbal consent given:  3:28 PM    CC: Blood pressure follow up     Current Outpatient Medications   Medication Sig Dispense Refill     hydrochlorothiazide (HYDRODIURIL) 25 MG tablet Take 1 tablet (25 mg) by mouth daily 90 tablet 3     lisinopril (ZESTRIL) 10 MG tablet Take 1 tablet (10 mg) by mouth daily 90 tablet 3     metoprolol tartrate (LOPRESSOR) 100 MG tablet Take 50 mg (half tablet) in the morning and 100 mg (1 full tablet) in the evening. 180 tablet 3     order for DME Equipment being ordered: FRANCISCO JAVIER stockings 1 Device 0     polyethylene glycol (MIRALAX) powder Take 17 g by mouth 2 times daily 850 g 11     Allergies   Allergen Reactions     Nkda [No Known Drug Allergies]           HPI   Patients name: Kevin  Appointment start time:  3:30    BP follow up:  Does not check BPs at home. Not interested in home BP cuff. Currently on hydrochlorothiazide, lisinopril, hydrochlorothiazide. Will need refills within the next month. Denies CP, SOB, HA. Occasionally has lightheadedness when getting up from seated position too fast. This does not occur frequently.         Assessment and Plan   1. Benign essential hypertension  BP has been stable on current regimen for quite sometime so refills have been sent. He will notify us if he experiences orthostatic symptoms. After risk-benefit discussion regarding aspirin for primary prevention, patient agreed to stop aspirin.   - hydrochlorothiazide (HYDRODIURIL) 25 MG tablet; Take 1 tablet (25 mg) by mouth daily  Dispense: 90 tablet; Refill: 3  - lisinopril (ZESTRIL) 10 MG tablet; Take 1 tablet (10 " mg) by mouth daily  Dispense: 90 tablet; Refill: 3  - metoprolol tartrate (LOPRESSOR) 100 MG tablet; Take 50 mg (half tablet) in the morning and 100 mg (1 full tablet) in the evening.  Dispense: 180 tablet; Refill: 3    2. Constipation, unspecified constipation type  - polyethylene glycol (MIRALAX) powder; Take 17 g by mouth 2 times daily  Dispense: 850 g; Refill: 11      Refilled medications that would be required in the next 3 months.     After Visit Information:  Will print and mail AVS     Appointment end time: 3:42  This is a telephone visit that took 12 minutes.      Clinician location:  [facility] [place]    Precepted with Dr. Hansen    Preceptor Attestation:  I discussed the patient with the resident. I have verified the content of the note, which accurately reflects my assessment of the patient and the plan of care.   Supervising Physician:  Sarath Hansen MD.

## 2020-04-15 DIAGNOSIS — K59.00 CONSTIPATION, UNSPECIFIED CONSTIPATION TYPE: ICD-10-CM

## 2020-04-15 RX ORDER — POLYETHYLENE GLYCOL 3350 17 G/17G
17 POWDER, FOR SOLUTION ORAL 2 TIMES DAILY
Qty: 255 G | Refills: 3 | Status: SHIPPED | OUTPATIENT
Start: 2020-04-15 | End: 2021-03-11

## 2021-02-09 ENCOUNTER — TELEPHONE (OUTPATIENT)
Dept: FAMILY MEDICINE | Facility: CLINIC | Age: 86
End: 2021-02-09

## 2021-03-11 ENCOUNTER — OFFICE VISIT (OUTPATIENT)
Dept: FAMILY MEDICINE | Facility: CLINIC | Age: 86
End: 2021-03-11
Payer: COMMERCIAL

## 2021-03-11 VITALS
BODY MASS INDEX: 25.16 KG/M2 | TEMPERATURE: 97.9 F | DIASTOLIC BLOOD PRESSURE: 124 MMHG | WEIGHT: 166 LBS | SYSTOLIC BLOOD PRESSURE: 169 MMHG | HEIGHT: 68 IN | OXYGEN SATURATION: 100 % | HEART RATE: 125 BPM

## 2021-03-11 DIAGNOSIS — I89.0 LYMPHEDEMA: ICD-10-CM

## 2021-03-11 DIAGNOSIS — N18.30 STAGE 3 CHRONIC KIDNEY DISEASE, UNSPECIFIED WHETHER STAGE 3A OR 3B CKD (H): ICD-10-CM

## 2021-03-11 DIAGNOSIS — I10 BENIGN ESSENTIAL HYPERTENSION: Primary | ICD-10-CM

## 2021-03-11 DIAGNOSIS — K59.00 CONSTIPATION, UNSPECIFIED CONSTIPATION TYPE: ICD-10-CM

## 2021-03-11 LAB
ANION GAP SERPL CALCULATED.3IONS-SCNC: 10 MMOL/L (ref 5–18)
BUN SERPL-MCNC: 19 MG/DL (ref 8–28)
CALCIUM SERPL-MCNC: 10.1 MG/DL (ref 8.5–10.5)
CHLORIDE SERPL-SCNC: 94 MMOL/L (ref 98–107)
CHOLEST SERPL-MCNC: 174 MG/DL
CO2 SERPL-SCNC: 24 MMOL/L (ref 22–31)
CREAT SERPL-MCNC: 1.31 MG/DL (ref 0.7–1.3)
FASTING?: NORMAL
GLUCOSE SERPL-MCNC: 107 MG/DL (ref 70–125)
HBA1C MFR BLD: 5.7 % (ref 4.1–5.7)
HDLC SERPL-MCNC: 50 MG/DL
LDLC SERPL CALC-MCNC: 101 MG/DL
POTASSIUM SERPL-SCNC: 4.6 MMOL/L (ref 3.5–5)
SODIUM SERPL-SCNC: 128 MMOL/L (ref 136–145)
TRIGL SERPL-MCNC: 113 MG/DL

## 2021-03-11 PROCEDURE — 36415 COLL VENOUS BLD VENIPUNCTURE: CPT | Performed by: STUDENT IN AN ORGANIZED HEALTH CARE EDUCATION/TRAINING PROGRAM

## 2021-03-11 PROCEDURE — 83036 HEMOGLOBIN GLYCOSYLATED A1C: CPT | Performed by: STUDENT IN AN ORGANIZED HEALTH CARE EDUCATION/TRAINING PROGRAM

## 2021-03-11 PROCEDURE — 99214 OFFICE O/P EST MOD 30 MIN: CPT | Mod: GC | Performed by: STUDENT IN AN ORGANIZED HEALTH CARE EDUCATION/TRAINING PROGRAM

## 2021-03-11 RX ORDER — POLYETHYLENE GLYCOL 3350 17 G/17G
17 POWDER, FOR SOLUTION ORAL 2 TIMES DAILY
Qty: 850 G | Refills: 3 | Status: SHIPPED | OUTPATIENT
Start: 2021-03-11 | End: 2021-07-13

## 2021-03-11 ASSESSMENT — MIFFLIN-ST. JEOR: SCORE: 1416.09

## 2021-03-11 NOTE — LETTER
March 17, 2021      Kevin Saldana  [address]  [place] [address]        Dear ,    We are writing to inform you of your test results.    Your sodium is on the lower side at 128.  This can happen for all sorts of reasons including dehydration, a diet low in sodium, or issues with your kidneys filtering sodium.  Symptoms of low sodium include headache, fatigue, lethargy, nausea, vomiting, dizziness, gait disturbances, forgetfulness, confusion, and muscle cramps.  At this point, especially if you don't have any symptoms, we should repeat some labwork either later this week or early next week to make sure it is correcting.  If you starts to develop any symptoms you should present to an ED for more acute evaluation.  Otherwise your kidney function appears stable, though is somewhat elevated.       Resulted Orders   Hemoglobin A1c ([facility])   Result Value Ref Range    Hemoglobin A1C 5.7 4.1 - 5.7 %   Basic Metabolic Profile ([facility])   Result Value Ref Range    Sodium 128 (L) 136 - 145 mmol/L    Potassium 4.6 3.5 - 5.0 mmol/L    Chloride 94 (L) 98 - 107 mmol/L    CO2, Total 24 22 - 31 mmol/L    Anion Gap 10 5 - 18 mmol/L    Glucose 107 70 - 125 mg/dL    Calcium 10.1 8.5 - 10.5 mg/dL    Urea Nitrogen 19 8 - 28 mg/dL    Creatinine 1.31 (H) 0.70 - 1.30 mg/dL    GFR Estimate If Black >60 >60 mL/min/1.73m2    GFR Estimate 52 (L) >60 mL/min/1.73m2    Narrative    Test performed by:  [facility]  [address] [place] [address]  Fasting Glucose reference range is 70-99 mg/dL per  American Diabetes Association (ADA) guidelines.   Lipid [place] ([facility]) - Results > 1 hr   Result Value Ref Range    Cholesterol 174 <=199 mg/dL    Triglycerides 113 <=149 mg/dL    HDL Cholesterol 50 >=40 mg/dL    LDL Cholesterol Calculated 101 <=129 mg/dL    Fasting? Unknown     Narrative    Test performed by:  [facility]  [address] [place]  [address]       If you have any questions or concerns, please call the clinic at the number listed above.       Sincerely,      Viviana Tolentino, DO

## 2021-03-11 NOTE — PROGRESS NOTES
CHIEF COMPLAINT                                                      Chief Complaint   Patient presents with     Forms     handi medical forms     Medication Reconciliation     doesnt know all names     SUBJECTIVE:                                                    Kevin Saldana is a 85 year old year old male who presents to clinic today for the following health issues:    Patient is here for follow up order for compression socks.   -Needs prescription for compression socks at this time.    -Paperwork was filled out  -Socks have been working well for the patient currently    HTN  -BP today is elevated at 169/124  -Occasionally has some pressure in his head associated with this when he stands.    -Happens everyday if he gets up too fast, but much better when he slowly gets up   -No headaches, vision changes, chest pressure or other concerning symptoms associated with this  -Does not check BP at home  -Taking all of his medications without issues    Patient is an established patient of this clinic.    ----------------------------------------------------------------------------------------------------------------------  Patient Active Problem List   Diagnosis     Health Care Home     S/P TURP     AK (actinic keratosis)     Benign essential hypertension     Balance problems     Stage 3 chronic kidney disease     Flaccid neuropathic bladder, not elsewhere classified     Urine test positive for microalbuminuria - has not yet met time criteria for diagnosis      Prediabetes     Past Surgical History:   Procedure Laterality Date     TURP  2007       Social History     Tobacco Use     Smoking status: Former Smoker     Smokeless tobacco: Never Used     Tobacco comment: quit 40 years prior   Substance Use Topics     Alcohol use: Yes     Alcohol/week: 0.0 standard drinks     Comment: 1 Beer once in a while     Family History   Problem Relation Age of Onset     Cancer Mother         lung     Other Cancer Mother          Lung?     C.A.D. No family hx of      Diabetes No family hx of      Hypertension No family hx of      Prostate Cancer No family hx of      Cancer - colorectal No family hx of      Coronary Artery Disease No family hx of      Hyperlipidemia No family hx of      Cerebrovascular Disease No family hx of      Breast Cancer No family hx of      Colon Cancer No family hx of      Depression No family hx of      Anxiety Disorder No family hx of      Mental Illness No family hx of      Substance Abuse No family hx of      Anesthesia Reaction No family hx of      Asthma No family hx of      Osteoporosis No family hx of      Genetic Disorder No family hx of      Thyroid Disease No family hx of      Obesity No family hx of      Unknown/Adopted No family hx of          Problem list and past medical, surgical, social, and family histories reviewed & adjusted, as indicated.    Current Outpatient Medications   Medication Sig Dispense Refill     hydrochlorothiazide (HYDRODIURIL) 25 MG tablet Take 1 tablet (25 mg) by mouth daily 90 tablet 3     lisinopril (ZESTRIL) 10 MG tablet Take 1 tablet (10 mg) by mouth daily 90 tablet 3     metoprolol tartrate (LOPRESSOR) 100 MG tablet Take 50 mg (half tablet) in the morning and 100 mg (1 full tablet) in the evening. 180 tablet 3     polyethylene glycol (MIRALAX) 17 GM/SCOOP powder Take 17 g by mouth 2 times daily 255 g 3     order for DME Equipment being ordered: FRANCISCO JAVIER stockings 1 Device 0     Medication list reviewed and updated as indicated.    Allergies   Allergen Reactions     Nkda [No Known Drug Allergies]      Allergies reviewed and updated as indicated.  ----------------------------------------------------------------------------------------------------------------------  ROS:  Constitutional, HEENT, cardiovascular, pulmonary, GI, musculoskeletal, neuro, skin, and psych systems are negative, except as otherwise noted.    OBJECTIVE:     BP (!) 169/124   Pulse 125   Temp 97.9  F (36.6  C)  "(Oral)   Ht 1.725 m (5' 7.91\")   Wt 75.3 kg (166 lb)   SpO2 100%   BMI 25.30 kg/m    Body mass index is 25.3 kg/m .  Exam:  Constitutional: healthy, alert and no distress  Head: Normocephalic. No masses, lesions, tenderness or abnormalities  Cardiovascular: negative, PMI normal. No lifts, heaves, or thrills. RRR. No murmurs, clicks gallops or rub  Respiratory: negative, Percussion normal. Good diaphragmatic excursion. Lungs clear  Gastrointestinal: Abdomen soft, non-tender. BS normal. No masses, organomegaly  Musculoskeletal: extremities normal- no gross deformities noted, gait normal and normal muscle tone  Skin: Pale, fragile skin  Neurologic: Gait normal. Reflexes normal and symmetric. Sensation grossly WNL.  Psychiatric: mentation appears normal and affect normal/bright  Hematologic/Lymphatic/Immunologic: Normal cervical lymph nodes    Diagnostic tests pending:  A1c  BMP  Lipid panel    ASSESSMENT/PLAN:     (I10) Benign essential hypertension  (primary encounter diagnosis)  (N18.30) Stage 3 chronic kidney disease, unspecified whether stage 3a or 3b CKD  -BP is elevated today at 169/124  -Patient is having some pressure in his head with standing secondary to this  -Has a history of some CKD in the past, but has not been reevaluated for this in several years.    -We will obtain BMP, A1c and lipid profile as it has been since 2019 that this was reevaluated   -Will call with results to adjust meds, would like to increase lisinopril to 20 mg if he can tolerate it, otherwise could consider adjusting metoprolol  -Plan to follow up in two weeks    OF NOTE, patient was offered to help schedule COVID vaccine but stated he would prefer to [facility] and [facility] he [facility] for all of his vaccines and prescription refills)    (I89.0) Lymphedema  -Patient has lymphedema of the lower extremities and needs compression socks for this  -Has been well under control with appropriate interventions "   -Filled out paperwork for this to help ensure improvement     There are no discontinued medications.    Options for treatment and follow-up care were reviewed with the patient and/or guardian. Kevin Saldana and/or guardian engaged in the decision making process and verbalized understanding of the options discussed and agreed with the final plan    Precepted with Dr. Tolentino.    Rio Brizuela MD on 3/11/2021 at 2:27 PM

## 2021-03-12 ENCOUNTER — TELEPHONE (OUTPATIENT)
Dept: FAMILY MEDICINE | Facility: CLINIC | Age: 86
End: 2021-03-12

## 2021-03-17 NOTE — RESULT ENCOUNTER NOTE
Team - please call patient with results.    Please let him know his sodium is on the lower side at 128.  This can happen for all sorts of reasons including dehydration, a diet low in sodium, or issues with your kidneys filtering sodium.  Symptoms of low sodium include headache, fatigue, lethargy, nausea, vomiting, dizziness, gait disturbances, forgetfulness, confusion, and muscle cramps.  At this point, especially if he has no symptoms, we should repeat some labwork either later this week or early next week to make sure it is correcting.  If he starts to develop any symptoms he should present to an ED for more acute evaluation.  Otherwise his kidney function appears stable, though is somewhat elevated.      Thanks,  Rio Brizuela MD on 3/17/2021 at 1:40 PM

## 2021-03-20 ENCOUNTER — IMMUNIZATION (OUTPATIENT)
Dept: FAMILY MEDICINE | Facility: CLINIC | Age: 86
End: 2021-03-20
Payer: COMMERCIAL

## 2021-03-20 PROCEDURE — 91301 PR COVID VAC MODERNA 100 MCG/0.5 ML IM: CPT

## 2021-03-20 PROCEDURE — 0011A PR COVID VAC MODERNA 100 MCG/0.5 ML IM: CPT

## 2021-03-29 DIAGNOSIS — I10 BENIGN ESSENTIAL HYPERTENSION: ICD-10-CM

## 2021-03-29 RX ORDER — HYDROCHLOROTHIAZIDE 25 MG/1
25 TABLET ORAL DAILY
Qty: 90 TABLET | Refills: 3 | Status: SHIPPED | OUTPATIENT
Start: 2021-03-29 | End: 2021-04-15 | Stop reason: SINTOL

## 2021-03-29 NOTE — TELEPHONE ENCOUNTER
Message to physician:     Date of last visit: 3/11/2021     Date of next visit if schedule: none  Last Comprehensive Metabolic Panel:  Sodium   Date Value Ref Range Status   03/11/2021 128 (L) 136 - 145 mmol/L Final     Potassium   Date Value Ref Range Status   03/11/2021 4.6 3.5 - 5.0 mmol/L Final     Chloride   Date Value Ref Range Status   03/11/2021 94 (L) 98 - 107 mmol/L Final     Carbon Dioxide   Date Value Ref Range Status   07/23/2019 26.0 20.0 - 32.0 mmol/L Final     Glucose   Date Value Ref Range Status   03/11/2021 107 70 - 125 mg/dL Final     Urea Nitrogen   Date Value Ref Range Status   03/11/2021 19 8 - 28 mg/dL Final     Creatinine   Date Value Ref Range Status   03/11/2021 1.31 (H) 0.70 - 1.30 mg/dL Final     GFR Estimate   Date Value Ref Range Status   03/11/2021 52 (L) >60 mL/min/1.73m2 Final     Calcium   Date Value Ref Range Status   03/11/2021 10.1 8.5 - 10.5 mg/dL Final       BP Readings from Last 3 Encounters:   03/11/21 (!) 169/124   02/25/20 129/75   01/22/20 (!) 142/70       Lab Results   Component Value Date    A1C 5.7 03/11/2021    A1C 5.7 12/01/2017                Please complete refill and CLOSE ENCOUNTER.  Closing the encounter signifies the refill is complete.

## 2021-04-05 DIAGNOSIS — I10 BENIGN ESSENTIAL HYPERTENSION: ICD-10-CM

## 2021-04-05 RX ORDER — LISINOPRIL 10 MG/1
10 TABLET ORAL DAILY
Qty: 90 TABLET | Refills: 0 | Status: SHIPPED | OUTPATIENT
Start: 2021-04-05 | End: 2021-06-29

## 2021-04-05 NOTE — TELEPHONE ENCOUNTER
It looks like he is getting the COVID shot soon, but should also just get an annual physical as well.

## 2021-04-07 DIAGNOSIS — I10 BENIGN ESSENTIAL HYPERTENSION: ICD-10-CM

## 2021-04-07 RX ORDER — METOPROLOL TARTRATE 100 MG
TABLET ORAL
Qty: 180 TABLET | Refills: 3 | Status: SHIPPED | OUTPATIENT
Start: 2021-04-07 | End: 2022-06-20

## 2021-04-15 ENCOUNTER — OFFICE VISIT (OUTPATIENT)
Dept: FAMILY MEDICINE | Facility: CLINIC | Age: 86
End: 2021-04-15
Payer: COMMERCIAL

## 2021-04-15 VITALS
HEIGHT: 69 IN | BODY MASS INDEX: 24.59 KG/M2 | WEIGHT: 166 LBS | TEMPERATURE: 97.5 F | SYSTOLIC BLOOD PRESSURE: 152 MMHG | DIASTOLIC BLOOD PRESSURE: 93 MMHG | HEART RATE: 118 BPM | OXYGEN SATURATION: 100 %

## 2021-04-15 DIAGNOSIS — R60.0 PERIPHERAL EDEMA: ICD-10-CM

## 2021-04-15 DIAGNOSIS — I10 BENIGN ESSENTIAL HYPERTENSION: Primary | ICD-10-CM

## 2021-04-15 DIAGNOSIS — E87.1 HYPONATREMIA: ICD-10-CM

## 2021-04-15 LAB
BUN SERPL-MCNC: 24 MG/DL (ref 7–30)
CALCIUM SERPL-MCNC: 10.3 MG/DL (ref 8.5–10.4)
CHLORIDE SERPLBLD-SCNC: 92 MMOL/L (ref 94–109)
CO2 SERPL-SCNC: 26 MMOL/L (ref 20–32)
CREAT SERPL-MCNC: 1.6 MG/DL (ref 0.8–1.5)
EGFR CALCULATED (NON BLACK REFERENCE): 43.9 ML/MIN
GLUCOSE SERPL-MCNC: 113 MG/DL (ref 60–109)
POTASSIUM SERPL-SCNC: 4.6 MMOL/L (ref 3.4–5.3)
SODIUM SERPL-SCNC: 133 MMOL/L (ref 133–144)

## 2021-04-15 PROCEDURE — 80048 BASIC METABOLIC PNL TOTAL CA: CPT | Performed by: STUDENT IN AN ORGANIZED HEALTH CARE EDUCATION/TRAINING PROGRAM

## 2021-04-15 PROCEDURE — 99214 OFFICE O/P EST MOD 30 MIN: CPT | Mod: GC | Performed by: STUDENT IN AN ORGANIZED HEALTH CARE EDUCATION/TRAINING PROGRAM

## 2021-04-15 PROCEDURE — 36415 COLL VENOUS BLD VENIPUNCTURE: CPT | Performed by: STUDENT IN AN ORGANIZED HEALTH CARE EDUCATION/TRAINING PROGRAM

## 2021-04-15 ASSESSMENT — MIFFLIN-ST. JEOR: SCORE: 1424.85

## 2021-04-15 NOTE — PROGRESS NOTES
Preceptor Attestation:   Patient seen, evaluated and discussed with the resident. I have verified the content of the note, which accurately reflects my assessment of the patient and the plan of care.  Supervising Physician:Rio Conner MD  [facility]

## 2021-04-15 NOTE — LETTER
April 20, 2021      Kevin Deweytt  [address]  [place] [address]        Dear ,    We are writing to inform you of your test results.    Your creatinine is still elevated, but it has been. Your sodium was looking a little better and your labs were otherwise stable.       Resulted Orders   Basic Metabolic Panel ([facility]) - Results < 1 hr   Result Value Ref Range    Glucose 113.0 (H) 60.0 - 109.0 mg/dL    Urea Nitrogen 24.0 7.0 - 30.0 mg/dL    Creatinine 1.6 (H) 0.8 - 1.5 mg/dL    Sodium 133.0 133.0 - 144.0 mmol/L    Potassium 4.6 3.4 - 5.3 mmol/L    Chloride 92.0 (L) 94.0 - 109.0 mmol/L    Carbon Dioxide 26.0 20.0 - 32.0 mmol/L    Calcium 10.3 8.5 - 10.4 mg/dL    eGFR Calculated (Non Black Reference) 43.9 (L) >60.0 mL/min      Comment:      eGFR is calculated by the CKD-EPI creatinine equation, without race   adjustment. eGFR can be influenced by muscle mass, exercise, and diet. The   reported eGFR is an estimation only and is only applicable if the renal   function is stable.         If you have any questions or concerns, please call the clinic at the number listed above.       Sincerely,      Rio Conner MD

## 2021-04-15 NOTE — PROGRESS NOTES
CHIEF COMPLAINT                                                      Chief Complaint   Patient presents with     Hypertension     follow up brought in home readings      Medication Reconciliation     completed      SUBJECTIVE:                                                    Kevin Saldana is a 86 year old year old male who presents to clinic today for the following health issues:    Hypertension Follow-up    Do you check your blood pressure regularly outside of the clinic? Yes   Are you following a low salt diet? No  Are your blood pressures ever more than 140 on the top number (systolic) OR more   than 90 on the bottom number (diastolic), for example 140/90? Yes    How many servings of fruits and vegetables do you eat daily?  4 or more  On average, how many sweetened beverages do you drink each day (Examples: soda, juice, sweet tea, etc.  Do NOT count diet or artificially sweetened beverages)?   0  How many days per week do you exercise enough to make your heart beat faster? 4  How many minutes a day do you exercise enough to make your heart beat faster? 10 - 19  How many days per week do you miss taking your medication? 0    Patient is an established patient of this clinic.    ----------------------------------------------------------------------------------------------------------------------  Patient Active Problem List   Diagnosis     Health Care Home     S/P TURP     AK (actinic keratosis)     Benign essential hypertension     Balance problems     Stage 3 chronic kidney disease     Flaccid neuropathic bladder, not elsewhere classified     Urine test positive for microalbuminuria - has not yet met time criteria for diagnosis      Prediabetes     Past Surgical History:   Procedure Laterality Date     TURP  2007       Social History     Tobacco Use     Smoking status: Former Smoker     Smokeless tobacco: Never Used     Tobacco comment: quit 40 years prior   Substance Use Topics     Alcohol use: Yes      Alcohol/week: 0.0 standard drinks     Comment: 1 Beer once in a while     Family History   Problem Relation Age of Onset     Cancer Mother         lung     Other Cancer Mother         Lung?     C.A.D. No family hx of      Diabetes No family hx of      Hypertension No family hx of      Prostate Cancer No family hx of      Cancer - colorectal No family hx of      Coronary Artery Disease No family hx of      Hyperlipidemia No family hx of      Cerebrovascular Disease No family hx of      Breast Cancer No family hx of      Colon Cancer No family hx of      Depression No family hx of      Anxiety Disorder No family hx of      Mental Illness No family hx of      Substance Abuse No family hx of      Anesthesia Reaction No family hx of      Asthma No family hx of      Osteoporosis No family hx of      Genetic Disorder No family hx of      Thyroid Disease No family hx of      Obesity No family hx of      Unknown/Adopted No family hx of          Problem list and past medical, surgical, social, and family histories reviewed & adjusted, as indicated.    Current Outpatient Medications   Medication Sig Dispense Refill     hydrochlorothiazide (HYDRODIURIL) 25 MG tablet Take 1 tablet (25 mg) by mouth daily 90 tablet 3     lisinopril (ZESTRIL) 10 MG tablet Take 1 tablet (10 mg) by mouth daily 90 tablet 0     metoprolol tartrate (LOPRESSOR) 100 MG tablet Take 50 mg (half tablet) in the morning and 100 mg (1 full tablet) in the evening. 180 tablet 3     order for DME Equipment being ordered: FRANCISCO JAVIER stockings 1 Device 0     polyethylene glycol (MIRALAX) 17 GM/Dose powder Take 17 g by mouth 2 times daily 850 g 3     Medication list reviewed and updated as indicated.    Allergies   Allergen Reactions     Nkda [No Known Drug Allergies]      Allergies reviewed and updated as indicated.  ----------------------------------------------------------------------------------------------------------------------  ROS:  Constitutional, HEENT,  "cardiovascular, pulmonary, GI, musculoskeletal, neuro, skin, and psych systems are negative, except as otherwise noted.    OBJECTIVE:     BP (!) 152/93   Pulse 118   Temp 97.5  F (36.4  C) (Oral)   Ht 1.747 m (5' 8.78\")   Wt 75.3 kg (166 lb)   SpO2 100%   BMI 24.67 kg/m    Body mass index is 24.67 kg/m .  Exam:  Constitutional: healthy, alert and no distress  Head: Normocephalic. No masses, lesions, tenderness or abnormalities  Cardiovascular: negative, PMI normal. No lifts, heaves, or thrills. RRR. No murmurs, clicks gallops or rub  Respiratory: negative, Percussion normal. Good diaphragmatic excursion. Lungs clear  Gastrointestinal: Abdomen soft, non-tender. BS normal. No masses, organomegaly  Musculoskeletal: extremities normal- no gross deformities noted, gait normal and normal muscle tone  Skin: no suspicious lesions or rashes, pale, fragile appearing skin  Neurologic: Gait normal with cane. Reflexes normal and symmetric. Sensation grossly WNL.  Psychiatric: mentation appears normal and affect normal/bright    Diagnostic Test Results:    ASSESSMENT/PLAN:     (I10) Benign essential hypertension  (primary encounter diagnosis)  (E87.1) Hyponatremia  (R60.9) Peripheral edema  -Patient presents today for follow up of BP  -At his last visit remained elevated, but stated when he checked at home his BP was well controlled and he had limited symptoms.   -Brought in last three weeks worth of results and a majority are <130/90, did have three days in the 150's but is never above 165  -Have been much better the last week or so, has even had some in the 90's systolically  -That makes managing his BP a little more difficult, BP today is mildly elevated at 152/93.    -Also noted to have hyponatremia at his last visit  -We will discontinue his hydrochlorothiazide at this time, and will hold off on starting another BP med   -Discussed that this may cause his BP to go up mildly, and he should call us if his BP is " consistently above 150 at home or if he develops any symptoms.    -We will follow up in two weeks to recheck BMP at that time  -Will contact sooner with any acute questions or concerns.    -Also recommended he cut back on how much fluid he drinks as it sounds like there may be a component of polydipsia (though his sugars have been good and his last A1c 3/11 was reassuring).      There are no discontinued medications.    Options for treatment and follow-up care were reviewed with the patient and/or guardian. Kevin Saldana and/or guardian engaged in the decision making process and verbalized understanding of the options discussed and agreed with the final plan    Precepted with Dr. Conner.    Rio Brizuela MD on 4/15/2021 at 10:21 AM

## 2021-04-15 NOTE — PATIENT INSTRUCTIONS
-We will stop your hydrochlorothiazide at this point in time   -Try cutting back a little on water as this point in time as it can lower your sodium   -We will see you back in two weeks.

## 2021-04-17 ENCOUNTER — IMMUNIZATION (OUTPATIENT)
Dept: FAMILY MEDICINE | Facility: CLINIC | Age: 86
End: 2021-04-17
Attending: FAMILY MEDICINE
Payer: COMMERCIAL

## 2021-04-17 PROCEDURE — 91301 PR COVID VAC MODERNA 100 MCG/0.5 ML IM: CPT

## 2021-04-17 PROCEDURE — 0012A PR COVID VAC MODERNA 100 MCG/0.5 ML IM: CPT

## 2021-04-30 ENCOUNTER — OFFICE VISIT (OUTPATIENT)
Dept: FAMILY MEDICINE | Facility: CLINIC | Age: 86
End: 2021-04-30
Payer: COMMERCIAL

## 2021-04-30 VITALS
RESPIRATION RATE: 18 BRPM | WEIGHT: 167.12 LBS | HEIGHT: 68 IN | BODY MASS INDEX: 25.33 KG/M2 | DIASTOLIC BLOOD PRESSURE: 86 MMHG | OXYGEN SATURATION: 99 % | SYSTOLIC BLOOD PRESSURE: 161 MMHG | HEART RATE: 77 BPM | TEMPERATURE: 98 F

## 2021-04-30 DIAGNOSIS — I10 BENIGN ESSENTIAL HYPERTENSION: Primary | ICD-10-CM

## 2021-04-30 LAB
BUN SERPL-MCNC: 22 MG/DL (ref 7–30)
CALCIUM SERPL-MCNC: 9.7 MG/DL (ref 8.5–10.4)
CHLORIDE SERPLBLD-SCNC: 98 MMOL/L (ref 94–109)
CO2 SERPL-SCNC: 26 MMOL/L (ref 20–32)
CREAT SERPL-MCNC: 1.3 MG/DL (ref 0.8–1.5)
EGFR CALCULATED (NON BLACK REFERENCE): 55.8 ML/MIN
GLUCOSE SERPL-MCNC: 104 MG/DL (ref 60–109)
POTASSIUM SERPL-SCNC: 4.1 MMOL/L (ref 3.4–5.3)
SODIUM SERPL-SCNC: 134 MMOL/L (ref 133–144)

## 2021-04-30 PROCEDURE — 80048 BASIC METABOLIC PNL TOTAL CA: CPT | Performed by: STUDENT IN AN ORGANIZED HEALTH CARE EDUCATION/TRAINING PROGRAM

## 2021-04-30 PROCEDURE — 99214 OFFICE O/P EST MOD 30 MIN: CPT | Performed by: STUDENT IN AN ORGANIZED HEALTH CARE EDUCATION/TRAINING PROGRAM

## 2021-04-30 PROCEDURE — 36415 COLL VENOUS BLD VENIPUNCTURE: CPT | Performed by: STUDENT IN AN ORGANIZED HEALTH CARE EDUCATION/TRAINING PROGRAM

## 2021-04-30 ASSESSMENT — MIFFLIN-ST. JEOR: SCORE: 1413.06

## 2021-04-30 NOTE — PROGRESS NOTES
CHIEF COMPLAINT                                                      Chief Complaint   Patient presents with     Hypertension     follow up HTN     SUBJECTIVE:                                                    Kevin Saldana is a 86 year old year old male who presents to clinic today for the following health issues:    Hypertension Follow-up    Do you check your blood pressure regularly outside of the clinic? Yes   Are you following a low salt diet? No  Are your blood pressures ever more than 140 on the top number (systolic) OR more   than 90 on the bottom number (diastolic), for example 140/90? Only once or twice    Patient is an established patient of this clinic.    ----------------------------------------------------------------------------------------------------------------------  Patient Active Problem List   Diagnosis     Health Care Home     S/P TURP     AK (actinic keratosis)     Benign essential hypertension     Balance problems     Stage 3 chronic kidney disease     Flaccid neuropathic bladder, not elsewhere classified     Urine test positive for microalbuminuria - has not yet met time criteria for diagnosis      Prediabetes     Past Surgical History:   Procedure Laterality Date     TURP  2007       Social History     Tobacco Use     Smoking status: Former Smoker     Smokeless tobacco: Never Used     Tobacco comment: quit 40 years prior   Substance Use Topics     Alcohol use: Yes     Alcohol/week: 0.0 standard drinks     Comment: 1 Beer once in a while     Family History   Problem Relation Age of Onset     Cancer Mother         lung     Other Cancer Mother         Lung?     C.A.D. No family hx of      Diabetes No family hx of      Hypertension No family hx of      Prostate Cancer No family hx of      Cancer - colorectal No family hx of      Coronary Artery Disease No family hx of      Hyperlipidemia No family hx of      Cerebrovascular Disease No family hx of      Breast Cancer No family hx of       "Colon Cancer No family hx of      Depression No family hx of      Anxiety Disorder No family hx of      Mental Illness No family hx of      Substance Abuse No family hx of      Anesthesia Reaction No family hx of      Asthma No family hx of      Osteoporosis No family hx of      Genetic Disorder No family hx of      Thyroid Disease No family hx of      Obesity No family hx of      Unknown/Adopted No family hx of          Problem list and past medical, surgical, social, and family histories reviewed & adjusted, as indicated.    Current Outpatient Medications   Medication Sig Dispense Refill     lisinopril (ZESTRIL) 10 MG tablet Take 1 tablet (10 mg) by mouth daily 90 tablet 0     metoprolol tartrate (LOPRESSOR) 100 MG tablet Take 50 mg (half tablet) in the morning and 100 mg (1 full tablet) in the evening. 180 tablet 3     order for DME Equipment being ordered: FRANCISCO JAVIER stockings 1 Device 0     polyethylene glycol (MIRALAX) 17 GM/Dose powder Take 17 g by mouth 2 times daily 850 g 3     Medication list reviewed and updated as indicated.    Allergies   Allergen Reactions     Nkda [No Known Drug Allergies]      Allergies reviewed and updated as indicated.  ----------------------------------------------------------------------------------------------------------------------  ROS:  Constitutional, HEENT, cardiovascular, pulmonary, GI, musculoskeletal, neuro, skin, and psych systems are negative, except as otherwise noted.    OBJECTIVE:     BP (!) 161/86   Pulse 77   Temp 98  F (36.7  C) (Oral)   Resp 18   Ht 1.72 m (5' 7.72\")   Wt 75.8 kg (167 lb 1.9 oz)   SpO2 99%   BMI 25.62 kg/m    Body mass index is 25.62 kg/m .  Exam:  Constitutional: healthy, alert and no distress  Head: Normocephalic. No masses, lesions, tenderness or abnormalities  Cardiovascular: negative, PMI normal. No lifts, heaves, or thrills. RRR. No murmurs, clicks gallops or rub  Respiratory: negative, Percussion normal. Good diaphragmatic excursion. " Lungs clear  Musculoskeletal: extremities normal- no gross deformities noted, gait normal and normal muscle tone  Skin: no suspicious lesions or rashes  Neurologic: Gait normal. Reflexes normal and symmetric. Sensation grossly WNL.  Psychiatric: mentation appears normal and affect normal/bright  Hematologic/Lymphatic/Immunologic: Normal cervical lymph nodes    Diagnostic Test Results: BMP pending     ASSESSMENT/PLAN:     (I10) Benign essential hypertension  (primary encounter diagnosis)  -BP is elevated here today, but BP at home has been well controlled in the 110's-120's in the morning and evening.    -Since stopping his hydrochlorothiazide he has felt improved and has actually had resolution of pressure in his head.    -At this point we will recheck BMP today  -Given his resolution of symptoms, the patient may have been having some hypotension at home causing this pressure.    -He appears to be well controlled at home and because of this we will hold off on further changes  -Will call with results  -Otherwise encouraged medicare annual wellness when the patient is feeling inclined for this.    -Will return sooner with any acute questions or concerns.      There are no discontinued medications.    Options for treatment and follow-up care were reviewed with the patient and/or guardian. Kevin Saldana and/or guardian engaged in the decision making process and verbalized understanding of the options discussed and agreed with the final plan    Precepted with Dr. Gatica.    Rio Brizuela MD on 4/30/2021 at 10:37 AM

## 2021-04-30 NOTE — PROGRESS NOTES
I have personally reviewed the history and examination as documented by Dr. Brizuela.  I was present during key portions of the visit and agree with the assessment and plan as documented for 86 yr old male with HTN here for follow-up. Home BPs are excellent. Will cont current regimen and recheck labs. Precautions given. Anticipatory guidance given.     Mathieu Gatica MD  April 30, 2021  10:46 AM

## 2021-04-30 NOTE — LETTER
May 3, 2021      Kevin Saldana  [address]  [place] [address]        Dear ,    We are writing to inform you of your test results.    Your blood work was reassuring today and that we can just follow up as needed going forward.     Resulted Orders   Basic Metabolic Panel ([facility]) - Results < 1 hr   Result Value Ref Range    Glucose 104.0 60.0 - 109.0 mg/dL    Urea Nitrogen 22.0 7.0 - 30.0 mg/dL    Creatinine 1.3 0.8 - 1.5 mg/dL    Sodium 134.0 133.0 - 144.0 mmol/L    Potassium 4.1 3.4 - 5.3 mmol/L    Chloride 98.0 94.0 - 109.0 mmol/L    Carbon Dioxide 26.0 20.0 - 32.0 mmol/L    Calcium 9.7 8.5 - 10.4 mg/dL    eGFR Calculated (Non Black Reference) 55.8 (L) >60.0 mL/min      Comment:      eGFR is calculated by the CKD-EPI creatinine equation, without race   adjustment. eGFR can be influenced by muscle mass, exercise, and diet. The   reported eGFR is an estimation only and is only applicable if the renal   function is stable.         If you have any questions or concerns, please call the clinic at the number listed above.       Sincerely,      Mathieu Gatica MD

## 2021-05-30 NOTE — TELEPHONE ENCOUNTER
RN cannot approve Refill Request    RN can NOT refill this medication Protocol failed and NO refill given. Last office visit: Visit date not found Last Physical: Visit date not found Last MTM visit: Visit date not found Last visit same specialty: Visit date not found.  Next visit within 3 mo: Visit date not found  Next physical within 3 mo: Visit date not found      Diane Hopson, [facility] Triage/Med Refill 7/17/2019    Requested Prescriptions   Pending Prescriptions Disp Refills     metoprolol tartrate (LOPRESSOR) 100 MG tablet [Pharmacy Med Name: METOPROLOL TARTRATE 100MG TABLETS] 135 tablet 0     Sig: TAKE 1/2 TABLET BY MOUTH EVERY MORNING AND TAKE 1 TABLET BY MOUTH EVERY EVENING       Beta-Blockers Refill Protocol Failed - 7/17/2019  5:06 AM        Failed - PCP or prescribing provider visit in past 12 months or next 3 months     Last office visit with prescriber/PCP: Visit date not found OR same dept: Visit date not found OR same specialty: Visit date not found  Last physical: Visit date not found Last MTM visit: Visit date not found   Next visit within 3 mo: Visit date not found  Next physical within 3 mo: Visit date not found  Prescriber OR PCP: Ilsa Wayne DO  Last diagnosis associated with med order: There are no diagnoses linked to this encounter.  If protocol passes may refill for 12 months if within 3 months of last provider visit (or a total of 15 months).             Failed - Blood pressure filed in past 12 months     BP Readings from Last 1 Encounters:   10/05/16 137/73

## 2021-06-08 NOTE — PROGRESS NOTES
[facility] Daily Progress     Patient Name: Kevin Saldana  Date: 2017  Visit #: 2  Referral Diagnosis: vertigo  Referring provider: Caitlin Thomas MD  Visit Diagnosis:     ICD-10-CM    1. Dizziness R42    2. Unsteadiness on feet R26.81          Assessment:     Patient is appropriate to continue with skilled occupational therapy intervention, as indicated by initial plan of care. Patient reports that the dizziness is resolved but the balance is only slightly improved. Progressed HEP with focus on balance exercises. Patient will work on these for 3-6 months. No need for follow up at this time. He is aware that he can ask his physician to send him back to therapy if indicated.    Goal Status:  Pt. will be independent with home exercise program in : 4 weeks  Pt. will be able to walk : on uneven/inclined surfaces;with less difficulty;in 12 weeks  Pt. will bend: to dress;in 12 weeks (without dizziness)  Patient will ascend / descend: curb;with less difficulty;in 12 weeks  Patient Turn Head: without dizziness;for conversation;in 12 weeks      Plan / Patient Education:     Continue with initial plan of care. Progress with home program as tolerated.    Subjective:     Pain Ratin    Objective:   Subjective progress relative to last visit:  Intensity of dizziness is:  less  Frequency of dizziness is: less  Duration of dizziness is: less  Balance is:  better    Positional Tests: Not tested    Treatment Today:  X1 viewing-60 seconds-continue for 2 weeks and then stop  Targets-60 seconds-continue for 2 weeks and then stop  Normal surface eyes closed-continue  Gait head motion-instructed  Tandem stance eyes open-instructed  single leg stance eyes open-instructed    TREATMENT MINUTES COMMENTS   Evaluation     Self-care/ Home management     Neuromuscular Re-education 20    Canalith repositioning procedure           Total 20    Blank areas are intentional and mean the treatment did not include these items.           Christin Stone  2/1/2017  1:01 PM

## 2021-06-09 NOTE — PROGRESS NOTES
[facility] Discharge Summary  Patient Name: Kevin Saldana  Date: 3/20/2017  Referral Diagnosis: Dizziness  Referring provider: Caitlin Thomas MD  Visit Diagnosis:   1. Dizziness     2. Unsteadiness on feet       Goals: partially met and progressing toward  Pt. will be independent with home exercise program in : 4 weeks  Pt. will be able to walk : on uneven/inclined surfaces;with less difficulty;in 12 weeks  Pt. will bend: to dress;in 12 weeks (without dizziness)  Patient will ascend / descend: curb;with less difficulty;in 12 weeks  Patient Turn Head: without dizziness;for conversation;in 12 weeks    Patient was seen for 2 visits between 1-18-17 and 2-1-17.    Therapy will be discontinued at this time.  See last progress note below. The patient will need a new referral to resume.    Thank you for your referral.  Christin Stone  3/20/2017  7:50 AM

## 2021-06-23 NOTE — TELEPHONE ENCOUNTER
Not [facility] pt  [facility] pt  Deena Roland RN [facility] Triage/Medication Refill

## 2021-06-23 NOTE — TELEPHONE ENCOUNTER
Refills declined. Not a [facility] patient. Pharmacy informed.  Dora Gill RN, [facility] Med Refill/Triage, 1/24/2019 2:42 PM

## 2021-06-23 NOTE — TELEPHONE ENCOUNTER
RN cannot approve Refill Request    RN can NOT refill this medication overdue for office visits and/or labs.    Rustam Grissom, [facility] Triage/Med Refill 1/24/2019    Requested Prescriptions   Pending Prescriptions Disp Refills     hydroCHLOROthiazide (HYDRODIURIL) 25 MG tablet [Pharmacy Med Name: HYDROCHLOROTHIAZIDE 25MG TABLETS] 90 tablet 0     Sig: TAKE 1 TABLET(25 MG) BY MOUTH DAILY    Diuretics/Combination Diuretics Refill Protocol  Failed - 1/24/2019 11:19 AM       Failed - Visit with PCP or prescribing provider visit in past 12 months    Last office visit with prescriber/PCP: Visit date not found OR same dept: Visit date not found OR same specialty: Visit date not found  Last physical: Visit date not found Last MTM visit: Visit date not found   Next visit within 3 mo: Visit date not found  Next physical within 3 mo: Visit date not found  Prescriber OR PCP: Ilsa Wayne, DO  Last diagnosis associated with med order: There are no diagnoses linked to this encounter.  If protocol passes may refill for 12 months if within 3 months of last provider visit (or a total of 15 months).            Failed - Serum Potassium in past 12 months     No results found for: LN-POTASSIUM         Failed - Serum Sodium in past 12 months     No results found for: LN-SODIUM         Failed - Blood pressure on file in past 12 months    BP Readings from Last 1 Encounters:   10/05/16 137/73            Failed - Serum Creatinine in past 12 months     No results found for: CREATININE

## 2021-06-29 DIAGNOSIS — I10 BENIGN ESSENTIAL HYPERTENSION: ICD-10-CM

## 2021-06-30 RX ORDER — LISINOPRIL 10 MG/1
10 TABLET ORAL DAILY
Qty: 90 TABLET | Refills: 3 | Status: SHIPPED | OUTPATIENT
Start: 2021-06-30 | End: 2022-06-20

## 2021-07-13 ENCOUNTER — MEDICAL CORRESPONDENCE (OUTPATIENT)
Dept: HEALTH INFORMATION MANAGEMENT | Facility: CLINIC | Age: 86
End: 2021-07-13

## 2021-07-13 DIAGNOSIS — K59.00 CONSTIPATION, UNSPECIFIED CONSTIPATION TYPE: ICD-10-CM

## 2021-07-13 NOTE — TELEPHONE ENCOUNTER
Message to physician:     Date of last visit: 4/30/2021     Date of next visit if scheduled: none    Last Comprehensive Metabolic Panel:  Sodium   Date Value Ref Range Status   04/30/2021 134.0 133.0 - 144.0 mmol/L Final     Potassium   Date Value Ref Range Status   04/30/2021 4.1 3.4 - 5.3 mmol/L Final     Chloride   Date Value Ref Range Status   04/30/2021 98.0 94.0 - 109.0 mmol/L Final     Carbon Dioxide   Date Value Ref Range Status   04/30/2021 26.0 20.0 - 32.0 mmol/L Final     Glucose   Date Value Ref Range Status   04/30/2021 104.0 60.0 - 109.0 mg/dL Final     Urea Nitrogen   Date Value Ref Range Status   04/30/2021 22.0 7.0 - 30.0 mg/dL Final     Creatinine   Date Value Ref Range Status   04/30/2021 1.3 0.8 - 1.5 mg/dL Final     GFR Estimate   Date Value Ref Range Status   03/11/2021 52 (L) >60 mL/min/1.73m2 Final     Calcium   Date Value Ref Range Status   04/30/2021 9.7 8.5 - 10.4 mg/dL Final       BP Readings from Last 3 Encounters:   04/30/21 (!) 161/86   04/15/21 (!) 152/93   03/11/21 (!) 169/124       Lab Results   Component Value Date    A1C 5.7 03/11/2021    A1C 5.7 12/01/2017                Please complete refill and CLOSE ENCOUNTER.  Closing the encounter signifies the refill is complete.

## 2021-07-15 RX ORDER — POLYETHYLENE GLYCOL 3350 17 G/17G
17 POWDER, FOR SOLUTION ORAL 2 TIMES DAILY
Qty: 850 G | Refills: 3 | Status: SHIPPED | OUTPATIENT
Start: 2021-07-15 | End: 2022-06-27

## 2021-12-23 ENCOUNTER — IMMUNIZATION (OUTPATIENT)
Dept: FAMILY MEDICINE | Facility: CLINIC | Age: 86
End: 2021-12-23
Payer: COMMERCIAL

## 2021-12-23 PROCEDURE — 0064A COVID-19,PF,MODERNA (18+ YRS BOOSTER .25ML): CPT

## 2021-12-23 PROCEDURE — 91306 COVID-19,PF,MODERNA (18+ YRS BOOSTER .25ML): CPT

## 2022-02-14 ENCOUNTER — MEDICAL CORRESPONDENCE (OUTPATIENT)
Dept: HEALTH INFORMATION MANAGEMENT | Facility: CLINIC | Age: 87
End: 2022-02-14
Payer: COMMERCIAL

## 2022-02-17 PROBLEM — R73.03 PREDIABETES: Status: ACTIVE | Noted: 2019-10-22

## 2022-06-20 DIAGNOSIS — I10 BENIGN ESSENTIAL HYPERTENSION: ICD-10-CM

## 2022-06-20 RX ORDER — METOPROLOL TARTRATE 100 MG
TABLET ORAL
Qty: 180 TABLET | Refills: 3 | Status: SHIPPED | OUTPATIENT
Start: 2022-06-20 | End: 2022-06-27

## 2022-06-20 RX ORDER — LISINOPRIL 10 MG/1
10 TABLET ORAL DAILY
Qty: 90 TABLET | Refills: 3 | Status: SHIPPED | OUTPATIENT
Start: 2022-06-20 | End: 2022-07-29 | Stop reason: DRUGHIGH

## 2022-06-24 ENCOUNTER — IMMUNIZATION (OUTPATIENT)
Dept: FAMILY MEDICINE | Facility: CLINIC | Age: 87
End: 2022-06-24
Payer: COMMERCIAL

## 2022-06-24 PROCEDURE — 91306 COVID-19,PF,MODERNA (18+ YRS BOOSTER .25ML): CPT

## 2022-06-24 PROCEDURE — 0064A COVID-19,PF,MODERNA (18+ YRS BOOSTER .25ML): CPT

## 2022-06-24 PROCEDURE — 99207 PR NO CHARGE LOS: CPT

## 2022-06-27 ENCOUNTER — OFFICE VISIT (OUTPATIENT)
Dept: FAMILY MEDICINE | Facility: CLINIC | Age: 87
End: 2022-06-27
Payer: COMMERCIAL

## 2022-06-27 VITALS
SYSTOLIC BLOOD PRESSURE: 151 MMHG | RESPIRATION RATE: 16 BRPM | BODY MASS INDEX: 25 KG/M2 | DIASTOLIC BLOOD PRESSURE: 99 MMHG | HEART RATE: 130 BPM | WEIGHT: 163.04 LBS | TEMPERATURE: 98 F | OXYGEN SATURATION: 100 %

## 2022-06-27 DIAGNOSIS — I48.92 ATRIAL FLUTTER BY ELECTROCARDIOGRAM (H): Primary | ICD-10-CM

## 2022-06-27 DIAGNOSIS — I10 BENIGN ESSENTIAL HYPERTENSION: ICD-10-CM

## 2022-06-27 DIAGNOSIS — R00.0 TACHYCARDIA: ICD-10-CM

## 2022-06-27 DIAGNOSIS — K59.00 CONSTIPATION, UNSPECIFIED CONSTIPATION TYPE: ICD-10-CM

## 2022-06-27 LAB
ANION GAP SERPL CALCULATED.3IONS-SCNC: 10 MMOL/L (ref 5–18)
BUN SERPL-MCNC: 14 MG/DL (ref 8–28)
CALCIUM SERPL-MCNC: 10.1 MG/DL (ref 8.5–10.5)
CHLORIDE BLD-SCNC: 100 MMOL/L (ref 98–107)
CO2 SERPL-SCNC: 23 MMOL/L (ref 22–31)
CREAT SERPL-MCNC: 1.27 MG/DL (ref 0.7–1.3)
GFR SERPL CREATININE-BSD FRML MDRD: 55 ML/MIN/1.73M2
GLUCOSE BLD-MCNC: 108 MG/DL (ref 70–125)
POTASSIUM BLD-SCNC: 5 MMOL/L (ref 3.5–5)
SODIUM SERPL-SCNC: 133 MMOL/L (ref 136–145)

## 2022-06-27 PROCEDURE — 99214 OFFICE O/P EST MOD 30 MIN: CPT | Mod: GC | Performed by: STUDENT IN AN ORGANIZED HEALTH CARE EDUCATION/TRAINING PROGRAM

## 2022-06-27 PROCEDURE — 80048 BASIC METABOLIC PNL TOTAL CA: CPT | Performed by: STUDENT IN AN ORGANIZED HEALTH CARE EDUCATION/TRAINING PROGRAM

## 2022-06-27 PROCEDURE — 93000 ELECTROCARDIOGRAM COMPLETE: CPT | Mod: GC | Performed by: STUDENT IN AN ORGANIZED HEALTH CARE EDUCATION/TRAINING PROGRAM

## 2022-06-27 PROCEDURE — 36415 COLL VENOUS BLD VENIPUNCTURE: CPT | Performed by: STUDENT IN AN ORGANIZED HEALTH CARE EDUCATION/TRAINING PROGRAM

## 2022-06-27 RX ORDER — POLYETHYLENE GLYCOL 3350 17 G/17G
1 POWDER, FOR SOLUTION ORAL 2 TIMES DAILY
Qty: 2580 G | Refills: 3 | Status: SHIPPED | OUTPATIENT
Start: 2022-06-27 | End: 2023-03-20

## 2022-06-27 RX ORDER — METOPROLOL TARTRATE 100 MG
100 TABLET ORAL 2 TIMES DAILY
Qty: 180 TABLET | Refills: 3 | Status: SHIPPED | OUTPATIENT
Start: 2022-06-27 | End: 2023-04-05

## 2022-06-27 RX ORDER — POLYETHYLENE GLYCOL 3350 17 G/17G
17 POWDER, FOR SOLUTION ORAL 2 TIMES DAILY
Qty: 850 G | Status: CANCELLED | OUTPATIENT
Start: 2022-06-27

## 2022-06-27 NOTE — PROGRESS NOTES
CHIEF COMPLAINT                                                      Chief Complaint   Patient presents with     RECHECK     Check up, Emani vaz change prescription pt wants  To send prescription for two times a day for laxative.     SUBJECTIVE:                                                    Kevin Saldana is a 87 year old year old male who presents to clinic today for the following health issues:    Medication question:  -Patient presented today to get the order for his miralax fixed as he would like to gt enough for three months  -States he is having normal bowel movements with the mediation, he just wants insurance to cover this  -Otherwise feeling at his baseline without any acute questions    Tachycardia  -Patient noted to be tachycardic  -Denies any symptoms when asked  -No chest pain, shortness of breath, chest tightness, feelings of an irregular heart rate or butterflies in the chest  -Has been told he has a fast heart rate in the past and why he was started on metoprolol    Patient is an established patient of this clinic.  ----------------------------------------------------------------------------------------------------------------------  Patient Active Problem List   Diagnosis     Health Care Home     S/P TURP     AK (actinic keratosis)     Benign essential hypertension     Balance problems     Stage 3 chronic kidney disease (H)     Flaccid neuropathic bladder, not elsewhere classified     Urine test positive for microalbuminuria - has not yet met time criteria for diagnosis      Prediabetes     Past Surgical History:   Procedure Laterality Date     TURP  2007       Social History     Tobacco Use     Smoking status: Former Smoker     Smokeless tobacco: Never Used     Tobacco comment: quit 40 years prior   Substance Use Topics     Alcohol use: Yes     Alcohol/week: 0.0 standard drinks     Comment: 1 Beer once in a while     Family History   Problem Relation Age of Onset     Cancer Mother          lung     Other Cancer Mother         Lung?     C.A.D. No family hx of      Diabetes No family hx of      Hypertension No family hx of      Prostate Cancer No family hx of      Cancer - colorectal No family hx of      Coronary Artery Disease No family hx of      Hyperlipidemia No family hx of      Cerebrovascular Disease No family hx of      Breast Cancer No family hx of      Colon Cancer No family hx of      Depression No family hx of      Anxiety Disorder No family hx of      Mental Illness No family hx of      Substance Abuse No family hx of      Anesthesia Reaction No family hx of      Asthma No family hx of      Osteoporosis No family hx of      Genetic Disorder No family hx of      Thyroid Disease No family hx of      Obesity No family hx of      Unknown/Adopted No family hx of          Problem list and past medical, surgical, social, and family histories reviewed & adjusted, as indicated.    Current Outpatient Medications   Medication Sig Dispense Refill     lisinopril (ZESTRIL) 10 MG tablet Take 1 tablet (10 mg) by mouth daily 90 tablet 3     metoprolol tartrate (LOPRESSOR) 100 MG tablet Take 50 mg (half tablet) in the morning and 100 mg (1 full tablet) in the evening. 180 tablet 3     order for DME Equipment being ordered: FRANCISCO JAVIER stockings 1 Device 0     polyethylene glycol (MIRALAX) 17 GM/Dose powder Take 17 g by mouth 2 times daily 850 g 3     Medication list reviewed and updated as indicated.    Allergies   Allergen Reactions     Nkda [No Known Drug Allergies]      Allergies reviewed and updated as indicated.  ----------------------------------------------------------------------------------------------------------------------  ROS:  Constitutional, HEENT, cardiovascular, pulmonary, GI, musculoskeletal, neuro, skin, and psych systems are negative, except as otherwise noted.    OBJECTIVE:     BP (!) 151/99   Pulse (!) 130   Temp 98  F (36.7  C)   Resp 16   Wt 74 kg (163 lb 0.6 oz)   SpO2 100%    BMI 25.00 kg/m    Body mass index is 25 kg/m .  Exam:  Constitutional: elderly appearing gentleman, alert and no distress  Head: Normocephalic. No masses, lesions, tenderness or abnormalities  Cardiovascular: Irregular rhythm, tachycardic, intact peripheral pulses at DP and radial, distal extremities are warm to touch, no murmur appreciated  Respiratory: CTAB, no wheezing, rales or rhonchi  Gastrointestinal: Abdomen soft, non-tender. BS normal. No masses, organomegaly  Musculoskeletal: extremities normal- no gross deformities noted, gait normal with cane, and normal muscle tone  Skin: Slightly pale skin, no sign of bruising or bleeding  Neurologic: Gait normal. Reflexes normal and symmetric. Sensation grossly WNL.  Psychiatric: mentation appears normal and affect normal/bright  Hematologic/Lymphatic/Immunologic: Normal cervical lymph nodes    Diagnostic Test Results:  BMP pending    ASSESSMENT/PLAN:     (I48.92) Atrial flutter by electrocardiogram (H)  (primary encounter diagnosis)  (R00.0) Tachycardia  (I10) Benign essential hypertension  -Patient noted to be tachycardic on exam today  -Has been told he is tachycardic in the past  -Unable to see any previous EKG's or documented history of atrial fibrillation or flutter   -EKG showing rate concerning for atrial flutter 2:1 with a rate around 100  -Patient asymptomatic with this  -Already on metoprolol 50 mg  mg PM and lisinopril 10 mg  -BP remains elevated today  -We will increase his metoprolol to 100 mg BID  -Given the flutter will also get the patient on blood thinner  -Will send apixiban 5 mg BID  -Testing kidney function at this time, was supposed to be stat but was accidentally sent out of clinic  -May need to decrease dose to 2.5 mg if kidney function gets worse  -Will follow up in a 3-5 days for reevaluation and to ensure his heart rate is improved with an increased dose of medications  -Had long conversation of symptoms to look for and reasons to  present to the ED for further evaluation     (K59.00) Constipation, unspecified constipation type  -patient having daily bowel movements with miralax twice daily  -Needs his prescription fixed so that enough is sent for 3 months per each perception  -This was adjusted and sent for the patient  understands if he has any problems to return for further evaluation    There are no discontinued medications.    Options for treatment and follow-up care were reviewed with the patient and/or guardian. Kevin Saldana and/or guardian engaged in the decision making process and verbalized understanding of the options discussed and agreed with the final plan    Precepted with Dr. Rosenstein.    Rio Brizuela MD on 6/27/2022 at 11:39 AM

## 2022-06-27 NOTE — TELEPHONE ENCOUNTER
Message to physician:     Date of last visit: 4/30/2021    Date of next visit if scheduled:     Potassium   Date Value Ref Range Status   04/30/2021 4.1 3.4 - 5.3 mmol/L Final     Creatinine   Date Value Ref Range Status   04/30/2021 1.3 0.8 - 1.5 mg/dL Final     GFR Estimate   Date Value Ref Range Status   03/11/2021 52 (L) >60 mL/min/1.73m2 Final       BP Readings from Last 3 Encounters:   04/30/21 (!) 161/86   04/15/21 (!) 152/93   03/11/21 (!) 169/124       Hemoglobin A1C POCT   Date Value Ref Range Status   03/11/2021 5.7 4.1 - 5.7 % Final       Please complete refill and CLOSE ENCOUNTER.  Closing the encounter signifies the refill is complete.

## 2022-06-27 NOTE — PROGRESS NOTES
Preceptor Attestation:   Patient seen, evaluated and discussed with the resident. I personally viewed the EKG and agree with the interpretation documented by the resident. I have verified the content of the note, which accurately reflects my assessment of the patient and the plan of care.    Tachycardic today, does seem to have been in the past too. Will evaluate with labs and EKG.  EKG reviewed, consistent with atrial flutter. Recommend anticoagulation. Consider cardiology referral to evaluate any indication for cardioversion.      Supervising Physician:Benjamin Rosenstein, MD, MA  [facility]

## 2022-06-27 NOTE — LETTER
June 29, 2022      Kevin Saldana  [address]  [place] [address]        Dear ,    We are writing to inform you of your test results.    Your kidney function is stable at this time though it looks like you may be a little dehydrated. The filtration of the kidney is a little reduced but it looks like you has some issues with this in the past.  We will continue to monitor this going forward.          Resulted Orders   Basic metabolic panel   Result Value Ref Range    Sodium 133 (L) 136 - 145 mmol/L    Potassium 5.0 3.5 - 5.0 mmol/L    Chloride 100 98 - 107 mmol/L    Carbon Dioxide (CO2) 23 22 - 31 mmol/L    Anion Gap 10 5 - 18 mmol/L    Urea Nitrogen 14 8 - 28 mg/dL    Creatinine 1.27 0.70 - 1.30 mg/dL    Calcium 10.1 8.5 - 10.5 mg/dL    Glucose 108 70 - 125 mg/dL    GFR Estimate 55 (L) >60 mL/min/1.73m2      Comment:      Effective December 21, 2021 eGFRcr in adults is calculated using the 2021 CKD-EPI creatinine equation which includes age and gender ( et al., NEJM, DOI: 10.1056/ARXXjn1119317)       If you have any questions or concerns, please call the clinic at the number listed above.       Sincerely,      Hope Hewitt MD

## 2022-07-01 ENCOUNTER — OFFICE VISIT (OUTPATIENT)
Dept: FAMILY MEDICINE | Facility: CLINIC | Age: 87
End: 2022-07-01
Payer: COMMERCIAL

## 2022-07-01 VITALS
OXYGEN SATURATION: 95 % | TEMPERATURE: 97.9 F | HEART RATE: 114 BPM | BODY MASS INDEX: 24.55 KG/M2 | DIASTOLIC BLOOD PRESSURE: 82 MMHG | HEIGHT: 68 IN | WEIGHT: 162 LBS | SYSTOLIC BLOOD PRESSURE: 125 MMHG | RESPIRATION RATE: 16 BRPM

## 2022-07-01 DIAGNOSIS — I48.92 ATRIAL FLUTTER BY ELECTROCARDIOGRAM (H): ICD-10-CM

## 2022-07-01 DIAGNOSIS — N18.30 STAGE 3 CHRONIC KIDNEY DISEASE, UNSPECIFIED WHETHER STAGE 3A OR 3B CKD (H): Primary | ICD-10-CM

## 2022-07-01 PROCEDURE — 99214 OFFICE O/P EST MOD 30 MIN: CPT | Mod: GC

## 2022-07-01 NOTE — PROGRESS NOTES
Assessment & Plan     Stage 3 chronic kidney disease, unspecified whether stage 3a or 3b CKD (H)  Atrial flutter by electrocardiogram (H)  Kevin presents for follow-up for atrial flutter diagnosed at his last appointment on 6/27/22. His metoprolol dose was increased at that time, and he was started on apixaban. Dr. Brizuela wanted to check patient's kidney function to make sure appropriate to start apixiban; creatinine returned at 1.27 which appears to be around his baseline; okay to continue with apixaban. Patient feels like his palpitations have improved, and is not having any side effects from the medication changes. Heart rate is still elevatd in clinic today at 114, but improved from prior visit. Instructed patient to continue on same dose of metoprolol, and to continue with the apixaban. Instructed patient to return in 1-2 months for a repeat check considering heart rate continues to be elevated; would consider starting another starting an addition medication at that time.  - Continue on metoprolol 100 mg BID  - Continue apixaban 5 mg BID  - Follow-up in 1-2 months     Bernice Butler MD  [facility]    Zoë Brown is a 87 year old presenting for the following health issues:  Follow Up (Follow up/ check heart )    HPI     On patient's last visit on 6/27/22 he was found to be in 2:1 atrial flutter via EKG with heart rate of 130. His metoprolol was increased to 100 mg BID, and he was started on apixiban 5 mg BID.    - Medications: metoprolol and apixiban   - Any recent changes: recently started apixaban, metoprolol dose increased   - Any side effects/intolerance: concerned about being able to shave, wants to know if the medication will make him bleed a dangerous amount.    - Able to take medications consistently: yes  - Chest pain: No   - Palpitations: Had been feeling this prior to starting the medication; this has resolved.  - Lightheaded/dizzy: Yes, in the morning. More  "with moving from sitting to standing  - Shortness of breath: No     Review of Systems   Constitutional, HEENT, cardiovascular, pulmonary, gi and gu systems are negative, except as otherwise noted.      Objective    /82   Pulse 114   Temp 97.9  F (36.6  C) (Oral)   Resp 16   Ht 1.73 m (5' 8.11\")   Wt 73.5 kg (162 lb)   SpO2 95%   BMI 24.55 kg/m    Body mass index is 24.55 kg/m .  Physical Exam   GENERAL: healthy, alert and no distress  EYES: Eyes grossly normal to inspection, PERRL and conjunctivae and sclerae normal  RESP: lungs clear to auscultation - no rales, rhonchi or wheezes  CV: irregularly irregular rhythm, tachycardic   MS: no gross musculoskeletal defects noted, no edema  SKIN: no suspicious lesions or rashes  PSYCH: mentation appears normal, affect normal/bright            .  ..  "

## 2022-07-01 NOTE — PROGRESS NOTES
Preceptor Attestation:   Patient seen, evaluated and discussed with the resident Dr. Butler. I have verified the content of the note, which accurately reflects my assessment of the patient and the plan of care.    Symptoms significantly improved and HR improving. Continue metoprolol 100mg BID -- if needing further blockade, consider EP vs additional medication or transition to coreg.     Supervising Physician:Benjamin Rosenstein, MD, MA  [facility]

## 2022-07-29 ENCOUNTER — OFFICE VISIT (OUTPATIENT)
Dept: FAMILY MEDICINE | Facility: CLINIC | Age: 87
End: 2022-07-29
Payer: COMMERCIAL

## 2022-07-29 VITALS
TEMPERATURE: 97.7 F | WEIGHT: 160.04 LBS | SYSTOLIC BLOOD PRESSURE: 156 MMHG | BODY MASS INDEX: 18.52 KG/M2 | OXYGEN SATURATION: 98 % | HEIGHT: 78 IN | DIASTOLIC BLOOD PRESSURE: 93 MMHG | RESPIRATION RATE: 20 BRPM | HEART RATE: 93 BPM

## 2022-07-29 DIAGNOSIS — I10 BENIGN ESSENTIAL HYPERTENSION: ICD-10-CM

## 2022-07-29 DIAGNOSIS — N18.30 STAGE 3 CHRONIC KIDNEY DISEASE, UNSPECIFIED WHETHER STAGE 3A OR 3B CKD (H): ICD-10-CM

## 2022-07-29 DIAGNOSIS — Z13.220 SCREENING FOR HYPERLIPIDEMIA: Primary | ICD-10-CM

## 2022-07-29 DIAGNOSIS — I48.92 ATRIAL FLUTTER BY ELECTROCARDIOGRAM (H): ICD-10-CM

## 2022-07-29 LAB
CHOLEST SERPL-MCNC: 149 MG/DL
HDLC SERPL-MCNC: 51 MG/DL
HGB BLD-MCNC: 13.4 G/DL (ref 13.3–17.7)
LDLC SERPL CALC-MCNC: 85 MG/DL
NONHDLC SERPL-MCNC: 98 MG/DL
TRIGL SERPL-MCNC: 64 MG/DL

## 2022-07-29 PROCEDURE — 85018 HEMOGLOBIN: CPT

## 2022-07-29 PROCEDURE — 99214 OFFICE O/P EST MOD 30 MIN: CPT | Mod: GC

## 2022-07-29 PROCEDURE — 36415 COLL VENOUS BLD VENIPUNCTURE: CPT

## 2022-07-29 PROCEDURE — 80061 LIPID PANEL: CPT

## 2022-07-29 RX ORDER — LISINOPRIL 20 MG/1
20 TABLET ORAL DAILY
Qty: 60 TABLET | Refills: 0 | Status: SHIPPED | OUTPATIENT
Start: 2022-07-29 | End: 2022-11-01

## 2022-07-29 NOTE — PROGRESS NOTES
Assessment & Plan     Benign essential hypertension  Patient has elevated blood pressure in clinic today to 156/93; his last few clinic visits have revealed blood pressures in the 140's/90's. He consistently takes his lisinopril 10 mg without issue. Will increase lisinopril to 20 mg and follow-up in one month.   - lisinopril (ZESTRIL) 20 MG tablet  Dispense: 60 tablet; Refill: 0    Atrial flutter by electrocardiogram (H)  Patient's heart rate has improved; last visit his heart rate was 114, today heart rate is 93. He denies any symptoms of shortness of breath, chest pain, palpitations or dizziness. He continues to tolerate taking apixaban and metoprolol twice a day, and has no questions about his medicines.   - apixaban ANTICOAGULANT (ELIQUIS) 5 MG tablet  Dispense: 180 tablet; Refill: 3  - Continue metoprolol 100 mg BID. May need to push heart rate down further with a calcium channel blocker but is currently asymptomatic with a HR of 93 bpm.     Screening for hyperlipidemia  - Lipid panel reflex to direct LDL Non-fasting    Stage 3 chronic kidney disease, unspecified whether stage 3a or 3b CKD (H)  Routine screening for anemia and microabuminuria with patient's diagnosis of CKD.   - Albumin Random Urine Quantitative with Creat Ratio  - Hemoglobin    Return in about 4 weeks (around 8/26/2022).    Bernice Butler MD  [facility]    Zoë Brown is a 87 year old, presenting for the following health issues:  Follow Up (Blood thinner med)    HPI     Atrial fibrillation follow-up  Patient was seen on 7/1/22 for A-fib follow-up; had started apixaban 5 mg BID without issue. At that time, his heart rate continued to be elevated at 114 despite taking metoprolol 100 mg BID. Heart rate is appropriate today at 93 and he has been feeling even better than he did at last appointment.   - Any problems with medications: No issues   - Chest pain: No  - Palpitations: No   - Shortness of breath: No  "  - Lightheadedness/dizziness: No     Blood pressure   - Blood pressure elevated today at 156/93. Appears to have been elevated at the last few visits in the 140's/150's systolic.   - Takes lisinopril 10 mg daily   - Does not check blood pressure at home     Review of Systems   Constitutional, HEENT, cardiovascular, pulmonary, gi and gu systems are negative, except as otherwise noted.      Objective    BP (!) 156/93 (BP Location: Left arm, Patient Position: Sitting, Cuff Size: Adult Regular)   Pulse 93   Temp 97.7  F (36.5  C) (Oral)   Resp 20   Ht 2.051 m (6' 8.75\")   Wt 72.6 kg (160 lb 0.6 oz)   SpO2 98%   BMI 17.26 kg/m    Body mass index is 17.26 kg/m .  Physical Exam   GENERAL: healthy, alert and no distress  EYES: Eyes grossly normal to inspection, PERRL and conjunctivae and sclerae normal  RESP: lungs clear to auscultation - no rales, rhonchi or wheezes  CV: regular rate and rhythm, normal S1 S2, no S3 or S4, no murmur, click or rub, no peripheral edema and peripheral pulses strong  MS: no gross musculoskeletal defects noted, no edema  SKIN: no suspicious lesions or rashes  NEURO: Normal strength and tone, mentation intact and speech normal  PSYCH: mentation appears normal, affect normal/bright  "

## 2022-07-29 NOTE — LETTER
August 3, 2022      Kevni Saldana  [address]  [place] [address]        Dear ,    We are writing to inform you of your test results.    At your clinic appointment on 7/29, we performed a test to check your cholesterol levels and your blood levels. Both of these tests came back normal, and no follow-up is needed for these issues at this time.        Resulted Orders   Lipid panel reflex to direct LDL Non-fasting   Result Value Ref Range    Cholesterol 149 <200 mg/dL    Triglycerides 64 <150 mg/dL    Direct Measure HDL 51 >=40 mg/dL    LDL Cholesterol Calculated 85 <=100 mg/dL    Non HDL Cholesterol 98 <130 mg/dL    Narrative    Cholesterol  Desirable:  <200 mg/dL    Triglycerides  Normal:  Less than 150 mg/dL  Borderline High:  150-199 mg/dL  High:  200-499 mg/dL  Very High:  Greater than or equal to 500 mg/dL    Direct Measure HDL  Female:  Greater than or equal to 50 mg/dL   Male:  Greater than or equal to 40 mg/dL    LDL Cholesterol  Desirable:  <100mg/dL  Above Desirable:  100-129 mg/dL   Borderline High:  130-159 mg/dL   High:  160-189 mg/dL   Very High:  >= 190 mg/dL    Non HDL Cholesterol  Desirable:  130 mg/dL  Above Desirable:  130-159 mg/dL  Borderline High:  160-189 mg/dL  High:  190-219 mg/dL  Very High:  Greater than or equal to 220 mg/dL   Hemoglobin   Result Value Ref Range    Hemoglobin 13.4 13.3 - 17.7 g/dL       If you have any questions or concerns, please call the clinic at the number listed above.       Sincerely,      Hope Hewitt MD

## 2022-07-29 NOTE — PATIENT INSTRUCTIONS
We will increase your lisinopril to 20 mg (pink pill). Until you run out of the lisinopril, take two a day at the same time (2 10 mg tablets). When you run out of that first prescription, the new prescription will just require one a day (1 20 mg tablet)  2. Follow-up with me on in one month

## 2022-08-01 NOTE — PROGRESS NOTES
Preceptor Attestation:  Patient's case reviewed and discussed with the resident, Bernice Butler MD, and I personally evaluated the patient. I agree with written assessment and plan of care.    Supervising Physician:  Hope Hewitt MD   [facility]

## 2022-08-02 LAB
CREAT UR-MCNC: 102 MG/DL
MICROALBUMIN UR-MCNC: <12 MG/L
MICROALBUMIN/CREAT UR: NORMAL MG/G{CREAT}

## 2022-08-02 PROCEDURE — 82043 UR ALBUMIN QUANTITATIVE: CPT

## 2022-08-30 ENCOUNTER — OFFICE VISIT (OUTPATIENT)
Dept: FAMILY MEDICINE | Facility: CLINIC | Age: 87
End: 2022-08-30
Payer: COMMERCIAL

## 2022-08-30 VITALS
HEIGHT: 68 IN | RESPIRATION RATE: 20 BRPM | TEMPERATURE: 98.1 F | BODY MASS INDEX: 24.25 KG/M2 | DIASTOLIC BLOOD PRESSURE: 71 MMHG | SYSTOLIC BLOOD PRESSURE: 121 MMHG | OXYGEN SATURATION: 99 % | WEIGHT: 160 LBS | HEART RATE: 84 BPM

## 2022-08-30 DIAGNOSIS — I10 BENIGN ESSENTIAL HYPERTENSION: ICD-10-CM

## 2022-08-30 DIAGNOSIS — I48.92 ATRIAL FLUTTER BY ELECTROCARDIOGRAM (H): ICD-10-CM

## 2022-08-30 DIAGNOSIS — N18.30 STAGE 3 CHRONIC KIDNEY DISEASE, UNSPECIFIED WHETHER STAGE 3A OR 3B CKD (H): Primary | ICD-10-CM

## 2022-08-30 PROCEDURE — 36415 COLL VENOUS BLD VENIPUNCTURE: CPT

## 2022-08-30 PROCEDURE — 99214 OFFICE O/P EST MOD 30 MIN: CPT | Mod: GC

## 2022-08-30 PROCEDURE — 80048 BASIC METABOLIC PNL TOTAL CA: CPT

## 2022-08-30 NOTE — LETTER
September 7, 2022      Kevin Saldana  [address] [facility] [address]  [place] [address]        Dear ,    We are writing to inform you of your test results.    Your BMP reveals hyponatremia with a sodium of 128. You are not on any medications that would be contributing to this. Please return for a lab only visit to recheck a BMP and to obtain a urine sodium and urine osm.        Resulted Orders   Basic metabolic panel   Result Value Ref Range    Creatinine 1.32 (H) 0.67 - 1.17 mg/dL    Sodium 128 (L) 136 - 145 mmol/L    Potassium 4.8 3.4 - 5.3 mmol/L    Urea Nitrogen 15.0 8.0 - 23.0 mg/dL    Chloride 93 (L) 98 - 107 mmol/L    Carbon Dioxide (CO2) 22 22 - 29 mmol/L    Anion Gap 13 7 - 15 mmol/L    Glucose 110 (H) 70 - 99 mg/dL    GFR Estimate 53 (L) >60 mL/min/1.73m2      Comment:      Effective December 21, 2021 eGFRcr in adults is calculated using the 2021 CKD-EPI creatinine equation which includes age and gender (Burke et al., NEJM, DOI: 10.1056/LAUMbc7518368)    Calcium 9.7 8.8 - 10.2 mg/dL       If you have any questions or concerns, please call the clinic at the number listed above.       Sincerely,      Viviana Tolentino, DO

## 2022-08-30 NOTE — PROGRESS NOTES
Assessment & Plan     Stage 3 chronic kidney disease, unspecified whether stage 3a or 3b CKD (H)  Benign essential hypertension  At last visit (7/29) lisinopril dose was increased from 10 mg to 20 mg; given that he has not had any symptoms of hypotension and BP is 121/ 71 today, will continue on the 20 mg.  - Basic metabolic panel  - If BMP does not show changes in kidney function, can follow up here in 3 months  - If BMP shoes increases in creatinine, will follow up here in 1 month and have a repeat BMP at that time    Atrial flutter  HR is controlled at 84 today and he is having no symptoms.  - Continue apixaban BID and metoprolol 100 mg     uLcie Goldberg MS3  [facility]    Resident/Fellow Attestation   I, Bernice Butler MD, was present with the medical/TRISTIAN student who participated in the service and in the documentation of the note.  I have verified the history and personally performed the physical exam and medical decision making.  I agree with the assessment and plan of care as documented in the note.      Bernice Butler MD   [facility], PGY-2      Zoë Brown is a 87 year old, presenting for the following health issues:  RECHECK (Heart murmur? unsure)      HPI     Atrial fibrillation   Patient currently managed on apixaban BID and metoprolol 100 mg BID. Patient's heart rate was appropriate at last visit at 93 and his symptoms of palpitations and lightheadedness had improved.   - Chest pain: no  - Palpitations: no  - Shortness of breath: no  - Lightheadedness/dizziness: he was diagnosed with vertigo 10 years ago, but no new or different dizziness   - uses a cane since being on apixaban  - HR at clinic today: 84    HTN follow up  - Medications: Patient last seen in clinic on 7/29/22; he had multiple elevated blood pressures over previous clinic visits, so his lisinopril was increased from 10 mg to 20 mg at that visit.   - Any side effects/intolerance: no  -  "Diet: no changes in diet or appetite  - Exercise: reports he does not do much exercise  - Home BP values: Does not check blood pressure at home. Does have a cuff at home but does not use it  - Low BP sx: Denies lightheadedness, orthostatic hypotension  - Blood pressure in clinic today: 121/71      Review of Systems   Constitutional, HEENT, cardiovascular, pulmonary, gi and gu systems are negative, except as otherwise noted.      Objective    /71   Pulse 84   Temp 98.1  F (36.7  C)   Resp 20   Ht 1.727 m (5' 8\")   Wt 72.6 kg (160 lb)   SpO2 99%   BMI 24.33 kg/m    Body mass index is 24.33 kg/m .  Physical Exam   GENERAL: healthy, alert and no distress  EYES: Eyes grossly normal to inspection, PERRL and conjunctivae and sclerae normal  RESP: lungs clear to auscultation - no rales, rhonchi or wheezes  CV: regular rate and rhythm, normal S1 S2, no S3 or S4, no murmur, click or rub  MS: no gross musculoskeletal defects noted      "

## 2022-08-31 LAB
ANION GAP SERPL CALCULATED.3IONS-SCNC: 13 MMOL/L (ref 7–15)
BUN SERPL-MCNC: 15 MG/DL (ref 8–23)
CALCIUM SERPL-MCNC: 9.7 MG/DL (ref 8.8–10.2)
CHLORIDE SERPL-SCNC: 93 MMOL/L (ref 98–107)
CREAT SERPL-MCNC: 1.32 MG/DL (ref 0.67–1.17)
DEPRECATED HCO3 PLAS-SCNC: 22 MMOL/L (ref 22–29)
GFR SERPL CREATININE-BSD FRML MDRD: 53 ML/MIN/1.73M2
GLUCOSE SERPL-MCNC: 110 MG/DL (ref 70–99)
POTASSIUM SERPL-SCNC: 4.8 MMOL/L (ref 3.4–5.3)
SODIUM SERPL-SCNC: 128 MMOL/L (ref 136–145)

## 2022-09-06 DIAGNOSIS — E87.1 HYPONATREMIA: Primary | ICD-10-CM

## 2022-09-15 ENCOUNTER — LAB (OUTPATIENT)
Dept: LAB | Facility: CLINIC | Age: 87
End: 2022-09-15
Payer: COMMERCIAL

## 2022-09-15 DIAGNOSIS — E87.1 HYPONATREMIA: ICD-10-CM

## 2022-09-15 LAB
ANION GAP SERPL CALCULATED.3IONS-SCNC: 11 MMOL/L (ref 7–15)
BUN SERPL-MCNC: 20.3 MG/DL (ref 8–23)
CALCIUM SERPL-MCNC: 9.7 MG/DL (ref 8.8–10.2)
CHLORIDE SERPL-SCNC: 96 MMOL/L (ref 98–107)
CREAT SERPL-MCNC: 1.33 MG/DL (ref 0.67–1.17)
DEPRECATED HCO3 PLAS-SCNC: 22 MMOL/L (ref 22–29)
GFR SERPL CREATININE-BSD FRML MDRD: 52 ML/MIN/1.73M2
GLUCOSE SERPL-MCNC: 105 MG/DL (ref 70–99)
POTASSIUM SERPL-SCNC: 5 MMOL/L (ref 3.4–5.3)
SODIUM SERPL-SCNC: 129 MMOL/L (ref 136–145)

## 2022-09-15 PROCEDURE — 84300 ASSAY OF URINE SODIUM: CPT

## 2022-09-15 PROCEDURE — 36415 COLL VENOUS BLD VENIPUNCTURE: CPT

## 2022-09-15 PROCEDURE — 80048 BASIC METABOLIC PNL TOTAL CA: CPT

## 2022-09-15 PROCEDURE — 83935 ASSAY OF URINE OSMOLALITY: CPT

## 2022-09-22 LAB
OSMOLALITY UR: 159 MMOL/KG (ref 100–1200)
SODIUM UR-SCNC: 44 MMOL/L

## 2022-09-29 ENCOUNTER — TELEPHONE (OUTPATIENT)
Dept: FAMILY MEDICINE | Facility: CLINIC | Age: 87
End: 2022-09-29

## 2022-09-29 DIAGNOSIS — E87.1 HYPONATREMIA: Primary | ICD-10-CM

## 2022-09-29 NOTE — TELEPHONE ENCOUNTER
Called patient to discuss lab results. 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 Patient estimates that he drinks three large glasses of water a day. I asked him to cut back to two glasses a day and follow up in two weeks, which the patient agreed to. [facility] already ordered for that appointment.    Bernice Butler MD

## 2022-09-30 ENCOUNTER — IMMUNIZATION (OUTPATIENT)
Dept: FAMILY MEDICINE | Facility: CLINIC | Age: 87
End: 2022-09-30
Payer: COMMERCIAL

## 2022-09-30 PROCEDURE — 0134A COVID-19,PF,MODERNA BIVALENT: CPT

## 2022-09-30 PROCEDURE — 91313 COVID-19,PF,MODERNA BIVALENT: CPT

## 2022-09-30 PROCEDURE — 90662 IIV NO PRSV INCREASED AG IM: CPT

## 2022-09-30 PROCEDURE — G0008 ADMIN INFLUENZA VIRUS VAC: HCPCS

## 2022-10-26 ENCOUNTER — HOSPITAL ENCOUNTER (OUTPATIENT)
Dept: CARDIOLOGY | Facility: CLINIC | Age: 87
Discharge: HOME | End: 2022-10-26
Attending: FAMILY MEDICINE | Admitting: FAMILY MEDICINE
Payer: COMMERCIAL

## 2022-10-26 DIAGNOSIS — I48.92 ATRIAL FLUTTER BY ELECTROCARDIOGRAM (H): ICD-10-CM

## 2022-10-26 LAB — LVEF ECHO: NORMAL

## 2022-10-26 PROCEDURE — 93306 TTE W/DOPPLER COMPLETE: CPT

## 2022-11-01 DIAGNOSIS — I10 BENIGN ESSENTIAL HYPERTENSION: ICD-10-CM

## 2022-11-02 RX ORDER — LISINOPRIL 20 MG/1
20 TABLET ORAL DAILY
Qty: 90 TABLET | Refills: 3 | Status: SHIPPED | OUTPATIENT
Start: 2022-11-02 | End: 2023-10-24

## 2022-12-24 NOTE — PROGRESS NOTES
Assessment & Plan     Hyponatremia  Patient presents for follow-up for hyponatremia. 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 Patient has made that change and has tolerated it well. Will obtain repeat BMP today to check sodium. If improved, will continue with fluid restriction. If sodium is same/worse, likely more of a tea/toast low intake picture with patient's limited diet.   - Basic metabolic panel    Benign essential hypertension  Patient's blood pressure is elevated today to 164/71; was normal at last check in August. He continues to take his lisinopril regularly without issue. Will have patient return for blood pressure check with nursing visit in two weeks. If normal at that time, will not make changes. If continued elevated, will increase blood pressure medication and have patient return in 3-4 weeks for follow-up.   - Nursing visit for blood pressure recheck in two weeks.     Bernice Butler MD  [facility] Subjective Harold is a 87 year old presenting for the following health issues:  RECHECK    HPI     Hyponatremia follow-up  Patient had low sodium on lab check back in September. 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 Patient estimated that he drinks three large glasses of water a day. I asked him to cut back to two glasses a day and follow up in two weeks; patient following up now.   - Has been drinking two glasses of water daily. He has been comfortable with that.   - Has been eating well. Notes that he tries to eat quick meals; eats breakfast and supper. Notes that he doesn't eat as much as he used to.      HTN follow-up  Patient was seen for his blood pressure back in August after his lisinopril had been increased from 10 mg to 20 mg. Blood pressure was normal at that visit at  "120/70. Blood pressure is elevated today at 164/71; patient notes that he has been taking his medications consistently. Denies chest pain, shortness of breath, palpitations. Doesn't check blood pressure at home.     ECHO results  Patient had ECHO performed on 10/26. This was ordered in the setting of patient's atrial flutter with tachycardia. ECHO returned with normal EF; mild 1+ regurgitation noted across all valves, otherwise normal.  Discussed these results with patient today.     Review of systems   Constitutional, HEENT, cardiovascular, pulmonary, gi and gu systems are negative, except as otherwise noted.      Objective    BP (!) 164/71   Pulse 74   Temp 97.8  F (36.6  C)   Resp 18   Ht 1.71 m (5' 7.32\")   Wt 72.6 kg (160 lb)   SpO2 99%   BMI 24.82 kg/m    Body mass index is 24.82 kg/m .  Physical Exam   GENERAL: healthy, alert and no distress  EYES: Eyes grossly normal to inspection, PERRL and conjunctivae and sclerae normal  RESP: lungs clear to auscultation - no rales, rhonchi or wheezes  CV: irregularly irregular rhythm and normal S1 S2, no S3 or S4  MS: no gross musculoskeletal defects noted, no edema  SKIN: no suspicious lesions or rashes      "

## 2022-12-26 ENCOUNTER — OFFICE VISIT (OUTPATIENT)
Dept: FAMILY MEDICINE | Facility: CLINIC | Age: 87
End: 2022-12-26
Payer: COMMERCIAL

## 2022-12-26 VITALS
SYSTOLIC BLOOD PRESSURE: 164 MMHG | WEIGHT: 160 LBS | RESPIRATION RATE: 18 BRPM | BODY MASS INDEX: 25.11 KG/M2 | HEART RATE: 74 BPM | TEMPERATURE: 97.8 F | DIASTOLIC BLOOD PRESSURE: 71 MMHG | HEIGHT: 67 IN | OXYGEN SATURATION: 99 %

## 2022-12-26 DIAGNOSIS — E87.1 HYPONATREMIA: Primary | ICD-10-CM

## 2022-12-26 DIAGNOSIS — I10 BENIGN ESSENTIAL HYPERTENSION: ICD-10-CM

## 2022-12-26 LAB
ANION GAP SERPL CALCULATED.3IONS-SCNC: 9 MMOL/L (ref 7–15)
BUN SERPL-MCNC: 25.3 MG/DL (ref 8–23)
CALCIUM SERPL-MCNC: 9.7 MG/DL (ref 8.8–10.2)
CHLORIDE SERPL-SCNC: 102 MMOL/L (ref 98–107)
CREAT SERPL-MCNC: 1.32 MG/DL (ref 0.67–1.17)
DEPRECATED HCO3 PLAS-SCNC: 25 MMOL/L (ref 22–29)
GFR SERPL CREATININE-BSD FRML MDRD: 53 ML/MIN/1.73M2
GLUCOSE SERPL-MCNC: 102 MG/DL (ref 70–99)
POTASSIUM SERPL-SCNC: 4.8 MMOL/L (ref 3.4–5.3)
SODIUM SERPL-SCNC: 136 MMOL/L (ref 136–145)

## 2022-12-26 PROCEDURE — 80048 BASIC METABOLIC PNL TOTAL CA: CPT

## 2022-12-26 PROCEDURE — 36415 COLL VENOUS BLD VENIPUNCTURE: CPT

## 2022-12-26 PROCEDURE — 99213 OFFICE O/P EST LOW 20 MIN: CPT | Mod: GC

## 2022-12-26 NOTE — PROGRESS NOTES
Faculty Supervision of Residents   I have examined this patient and the medical care has been evaluated and discussed with the resident. See resident note outlining our discussion.      Elena Nevarez MD

## 2023-01-18 ENCOUNTER — TELEPHONE (OUTPATIENT)
Dept: FAMILY MEDICINE | Facility: CLINIC | Age: 88
End: 2023-01-18

## 2023-01-18 NOTE — TELEPHONE ENCOUNTER
[facility] phone call message- general phone call:    Reason for call:     Patient call and wanted to schedule a appointment with nurse visit for bp check. I ask if his bp was high when he last check with Dr. Butler. Patient advise that it was and he is suppose to see a nurse for a bp check. I advise patient that because of what patient said, if it is true to what he said then he will need to see a provider for a bp check. Patient only want to see Dr. Butler. Because patient is a 40 minute patient Dr. Butler first avail day for patient will be on Tues in the morning. Would like a call back from Dr. Butler.     Please call patient back and advise. Thank you.     Return call needed: Yes    OK to leave a message on voice mail? Yes    Primary language: English      needed? No    Call taken on January 18, 2023 at 1:33 PM by Unique Hendrickson

## 2023-02-02 NOTE — TELEPHONE ENCOUNTER
Called patient and discussed setting up a follow-up visit. Patient would be able to come back in a month for a blood pressure visit. He agrees to call the clinic to set up an appointment.     Bernice Butler MD

## 2023-03-20 ENCOUNTER — OFFICE VISIT (OUTPATIENT)
Dept: FAMILY MEDICINE | Facility: CLINIC | Age: 88
End: 2023-03-20
Payer: COMMERCIAL

## 2023-03-20 VITALS
BODY MASS INDEX: 25.74 KG/M2 | SYSTOLIC BLOOD PRESSURE: 158 MMHG | HEIGHT: 67 IN | WEIGHT: 164 LBS | RESPIRATION RATE: 18 BRPM | OXYGEN SATURATION: 99 % | DIASTOLIC BLOOD PRESSURE: 67 MMHG | HEART RATE: 68 BPM

## 2023-03-20 DIAGNOSIS — I10 BENIGN ESSENTIAL HYPERTENSION: Primary | ICD-10-CM

## 2023-03-20 DIAGNOSIS — I48.92 ATRIAL FLUTTER BY ELECTROCARDIOGRAM (H): ICD-10-CM

## 2023-03-20 DIAGNOSIS — K59.00 CONSTIPATION, UNSPECIFIED CONSTIPATION TYPE: ICD-10-CM

## 2023-03-20 PROCEDURE — 99214 OFFICE O/P EST MOD 30 MIN: CPT | Mod: GC

## 2023-03-20 RX ORDER — POLYETHYLENE GLYCOL 3350 17 G/17G
1 POWDER, FOR SOLUTION ORAL 2 TIMES DAILY
Qty: 2580 G | Refills: 3 | Status: SHIPPED | OUTPATIENT
Start: 2023-03-20 | End: 2023-06-06

## 2023-03-20 RX ORDER — AMLODIPINE BESYLATE 5 MG/1
5 TABLET ORAL AT BEDTIME
Qty: 90 TABLET | Refills: 0 | Status: SHIPPED | OUTPATIENT
Start: 2023-03-20 | End: 2023-04-05

## 2023-03-20 RX ORDER — METOPROLOL TARTRATE 100 MG
100 TABLET ORAL 2 TIMES DAILY
Qty: 180 TABLET | Refills: 3 | Status: SHIPPED | OUTPATIENT
Start: 2023-03-20 | End: 2024-02-27

## 2023-03-20 NOTE — PROGRESS NOTES
Assessment & Plan     Benign essential hypertension  Patient has labile blood pressures; but has been consistently elevated >160 systolic at the last couple of checks. Will continue lisinopril 20 mg, and will start amlodipine 5 mg. Asked patient to check his blood pressure 3 times weekly and write the number down.   - amLODIPine (NORVASC) 5 MG tablet  Dispense: 90 tablet; Refill: 0  - BMP next visit   - Follow-up 2 weeks    SIADH   Patient's sodium level returned to normal after decreasing his water intake to two glasses per day. Patient feels comfortable on this amount, but since we will get blood work at next visit anyway, will increase intake to 2.5 glasses and recheck sodium at next visit.   - BMP next visit     Atrial flutter by electrocardiogram (H)  Patient continues to tolerate his medications. His heart rate is controlled today.    - apixaban ANTICOAGULANT (ELIQUIS) 5 MG tablet  Dispense: 180 tablet; Refill: 3  - metoprolol tartrate (LOPRESSOR) 100 MG tablet  Dispense: 180 tablet; Refill: 3    Constipation, unspecified constipation type  Uses Miralax daily. Would like a refill today.   - polyethylene glycol (MIRALAX) 17 GM/Dose powder  Dispense: 2580 g; Refill: 3    Return in about 2 weeks (around 4/3/2023).    Bernice Butler MD  [facility]    Zoë Brown is a 87 year old, presenting for the following health issues:  Follow Up (Bp, Prescription )    HPI     HTN follow up  - Medications: Currently on metoprolol 100 mg BID, lisinopril 20 mg    - Any recent changes: None   - Any side effects/intolerance: None  - Home BP values: Patient does have a blood pressure cuff. He notes that he has not been checking his blood pressure because he doesn't know what to do with the results.   - Low BP sx: Denies lightheadedness, orthostatic hypotension  - High BP sx: Denies headache, visual blurring, chest pain, SOB, leg swelling    Review of Systems   Constitutional, HEENT,  "cardiovascular, pulmonary, gi and gu systems are negative, except as otherwise noted.      Objective    BP (!) 158/67   Pulse 68   Resp 18   Ht 1.702 m (5' 7\")   Wt 74.4 kg (164 lb)   SpO2 99%   BMI 25.69 kg/m    Body mass index is 25.69 kg/m .  Physical Exam   GENERAL: healthy, alert and no distress  EYES: Eyes grossly normal to inspection, PERRL and conjunctivae and sclerae normal  RESP: lungs clear to auscultation - no rales, rhonchi or wheezes  CV: regular rate and rhythm, normal S1 S2, no S3 or S4, no murmur  MS: no gross musculoskeletal defects noted, no edema        "

## 2023-03-20 NOTE — PROGRESS NOTES
Preceptor Attestation:  Patient's case reviewed and discussed with the resident, Bernice Butler MD, and I personally evaluated the patient. I agree with written assessment and plan of care.    Supervising Physician:  Lucie Hardin MD   [facility]

## 2023-04-03 ENCOUNTER — OFFICE VISIT (OUTPATIENT)
Dept: FAMILY MEDICINE | Facility: CLINIC | Age: 88
End: 2023-04-03
Payer: COMMERCIAL

## 2023-04-03 VITALS
SYSTOLIC BLOOD PRESSURE: 154 MMHG | WEIGHT: 158 LBS | TEMPERATURE: 98.1 F | HEIGHT: 67 IN | BODY MASS INDEX: 24.8 KG/M2 | DIASTOLIC BLOOD PRESSURE: 73 MMHG | OXYGEN SATURATION: 99 % | HEART RATE: 67 BPM

## 2023-04-03 DIAGNOSIS — I10 BENIGN ESSENTIAL HYPERTENSION: Primary | ICD-10-CM

## 2023-04-03 DIAGNOSIS — E87.1 HYPONATREMIA: ICD-10-CM

## 2023-04-03 LAB
ANION GAP SERPL CALCULATED.3IONS-SCNC: 11 MMOL/L (ref 7–15)
BUN SERPL-MCNC: 22.2 MG/DL (ref 8–23)
CALCIUM SERPL-MCNC: 9.9 MG/DL (ref 8.8–10.2)
CHLORIDE SERPL-SCNC: 93 MMOL/L (ref 98–107)
CREAT SERPL-MCNC: 1.44 MG/DL (ref 0.67–1.17)
DEPRECATED HCO3 PLAS-SCNC: 23 MMOL/L (ref 22–29)
GFR SERPL CREATININE-BSD FRML MDRD: 47 ML/MIN/1.73M2
GLUCOSE SERPL-MCNC: 103 MG/DL (ref 70–99)
POTASSIUM SERPL-SCNC: 5.1 MMOL/L (ref 3.4–5.3)
SODIUM SERPL-SCNC: 127 MMOL/L (ref 136–145)

## 2023-04-03 PROCEDURE — 36415 COLL VENOUS BLD VENIPUNCTURE: CPT

## 2023-04-03 PROCEDURE — 80048 BASIC METABOLIC PNL TOTAL CA: CPT

## 2023-04-03 PROCEDURE — 99214 OFFICE O/P EST MOD 30 MIN: CPT | Mod: GC

## 2023-04-03 NOTE — PROGRESS NOTES
Assessment & Plan     Benign essential hypertension  Patient's blood pressure appears improved after starting the amlodipine 5 mg last visit, denies any side effects. Will continue on lisinopril 20 mg and amlodipine 5 mg. Discussed goal of less than 150/90 consistently. Patient agrees to follow-up in two months, and will take his blood [id number] 2-3 times a week with documentation prior to that visit.     Hyponatremia  Suspect previous hyponatremia was due to SIADH. Because patient's intake was so limited at two glasses, discussed attempting a trial of increased intake with three glasses, but patient's sodium decreased again. Will plan to continue patient on limit of 2 glasses of water per day, which patient is okay with.   - Basic metabolic panel    Return in about 2 months (around 6/3/2023).    Bernice Butler MD  [facility] [place] Brown is a 87 year old, presenting for the following health issues:  Follow Up (Follow up on Blood pressure/)    HPI     Blood pressure follow-up  - Patient's blood pressure had been consistently elevated >160 systolic at the last couple of clinic checks. At last visit, continude lisinopril 20 mg, and started amlodipine 5 mg.  - Tolerating new medication: yes, no side effects. Taking it daily   - Blood pressure values at home: range between 130's-160's systolic. Majority are in the 140 systolic range. Diastolic values consistently in the 70's-80's range.   - Has been getting up slowly and has not been experiencing any orthostatic hypotension    SIADH  Patient has a history of SIADH. Earlier in the year, limited patient's water intake to two glasses a day which improved his sodium. Discussed trying to go up on his fluid intake again, up to three glasses a day. Patient has done this with plans to repeat a BMP today.     Review of Systems   Constitutional, HEENT, cardiovascular, pulmonary, gi and gu systems are negative, except as otherwise  "noted.      Objective    BP (!) 154/73   Pulse 67   Temp 98.1  F (36.7  C) (Oral)   Ht 1.702 m (5' 7\")   Wt 71.7 kg (158 lb)   SpO2 99%   BMI 24.75 kg/m    Body mass index is 24.75 kg/m .  Physical Exam   GENERAL: healthy, alert and no distress  EYES: Eyes grossly normal to inspection, PERRL and conjunctivae and sclerae normal  RESP: lungs clear to auscultation - no rales, rhonchi or wheezes  CV: regular rate and rhythm, normal S1 S2, no S3 or S4, no murmur, click or rub, no peripheral edema and peripheral pulses strong  MS: no gross musculoskeletal defects noted, no edema  PSYCH: mentation appears normal, affect normal/bright          "

## 2023-04-03 NOTE — PATIENT INSTRUCTIONS
We want your blood pressure to be consistently less than 150 for the top number.   Check your blood pressure a couple of times a week and write the number down.   Come back in two months  Keep taking your lisinopril and amlodipine  We will check your sodium level today. If normal, keep drinking three glasses of water.

## 2023-04-05 RX ORDER — AMLODIPINE BESYLATE 5 MG/1
5 TABLET ORAL AT BEDTIME
Qty: 90 TABLET | Refills: 1 | Status: SHIPPED | OUTPATIENT
Start: 2023-04-05 | End: 2023-06-06

## 2023-06-06 ENCOUNTER — OFFICE VISIT (OUTPATIENT)
Dept: FAMILY MEDICINE | Facility: CLINIC | Age: 88
End: 2023-06-06
Payer: COMMERCIAL

## 2023-06-06 VITALS
DIASTOLIC BLOOD PRESSURE: 72 MMHG | SYSTOLIC BLOOD PRESSURE: 148 MMHG | BODY MASS INDEX: 22.96 KG/M2 | HEART RATE: 72 BPM | HEIGHT: 69 IN | WEIGHT: 155 LBS

## 2023-06-06 DIAGNOSIS — R26.81 GAIT INSTABILITY: ICD-10-CM

## 2023-06-06 DIAGNOSIS — R42 VERTIGO: ICD-10-CM

## 2023-06-06 DIAGNOSIS — K59.00 CONSTIPATION, UNSPECIFIED CONSTIPATION TYPE: ICD-10-CM

## 2023-06-06 DIAGNOSIS — E87.1 HYPONATREMIA: ICD-10-CM

## 2023-06-06 DIAGNOSIS — H90.6 MIXED CONDUCTIVE AND SENSORINEURAL HEARING LOSS OF BOTH EARS: ICD-10-CM

## 2023-06-06 DIAGNOSIS — I10 BENIGN ESSENTIAL HYPERTENSION: Primary | ICD-10-CM

## 2023-06-06 PROBLEM — N40.1 BENIGN PROSTATIC HYPERPLASIA WITH URINARY OBSTRUCTION: Status: ACTIVE | Noted: 2023-03-01

## 2023-06-06 PROBLEM — N13.8 BENIGN PROSTATIC HYPERPLASIA WITH URINARY OBSTRUCTION: Status: ACTIVE | Noted: 2023-03-01

## 2023-06-06 PROCEDURE — 36415 COLL VENOUS BLD VENIPUNCTURE: CPT

## 2023-06-06 PROCEDURE — 99214 OFFICE O/P EST MOD 30 MIN: CPT | Mod: GC

## 2023-06-06 PROCEDURE — 80048 BASIC METABOLIC PNL TOTAL CA: CPT

## 2023-06-06 RX ORDER — POLYETHYLENE GLYCOL 3350 17 G/17G
17 POWDER, FOR SOLUTION ORAL 2 TIMES DAILY
Qty: 2580 G | Refills: 4 | Status: SHIPPED | OUTPATIENT
Start: 2023-06-06 | End: 2024-02-27

## 2023-06-06 RX ORDER — AMLODIPINE BESYLATE 5 MG/1
5 TABLET ORAL AT BEDTIME
Qty: 90 TABLET | Refills: 3 | Status: SHIPPED | OUTPATIENT
Start: 2023-06-06 | End: 2024-02-27

## 2023-06-06 NOTE — PROGRESS NOTES
Preceptor Attestation:   Patient seen, evaluated and discussed with the resident Dr. Butler. I have verified the content of the note, which accurately reflects my assessment of the patient and the plan of care.  Supervising Physician:Benjamin Rosenstein, MD, MA  [facility]

## 2023-06-06 NOTE — PATIENT INSTRUCTIONS
I will put in a referral to audiology. They will call you. They will evaluate you for hearing aids.   I put in an order for a four wheel walker. I will look into how it will get to you.  Your blood pressure looks good today. We will not change your medications.   I refilled your amlodipine and Miralax.

## 2023-06-06 NOTE — PROGRESS NOTES
Assessment & Plan     Benign essential hypertension  Blood pressure appropriate in clinic today. Had patient measure blood pressures at home over the past two weeks, consistently at goal. Will continue on lisinopril 20 mg and amlodipine 5 mg without change.   - amLODIPine (NORVASC) 5 MG tablet  Dispense: 90 tablet; Refill: 3    Hyponatremia  Patient with history of SIADH. Attempted to increase fluid intake at last visit, but sodium dropped again. Rechecking sodium today after returning to previous fluid restriction; returned improved at 134.   - Basic metabolic panel    Mixed conductive and sensorineural hearing loss of both ears  Patient has a history of gradual, bilateral hearing loss. He is interested in being evaluated for hearing aids. Will submit audiology referral today.   - Adult Audiology  Referral    Gait instability  Patient felt like he injured his back two months ago, started using a walker for support. He feels much more stable walking with a walker, and would like to continue using one. Will put in order for a four wheel walker.   - Walker Order for DME - ONLY FOR DME    Vertigo  Patient notes a history of intermittent dizziness. He states that he was diagnosed with vertigo years ago and did go to therapy for it, did not find it helpful. He is not interested in attending it again. Currently finds his symptoms manageable.     Constipation, unspecified constipation type  Refilling Miralax today, patient is having regular bowel movements while using this medication.   - polyethylene glycol (MIRALAX) 17 GM/Dose powder  Dispense: 2580 g; Refill: 4      Return in about 3 months (around 9/6/2023).    Bernice Butler MD  [facility]    Zoë Brown is a 88 year old, presenting for the following health issues:  Hypertension and Medication Problem (He have some question about the medication )    HPI     HTN follow up  - Medications: lisinopril 20 mg and amlodipine  "5 mg    - Any recent changes: None   - Any side effects/intolerance: None   - Home BP values: 115-140's/50-60's  - Notes intermittent dizziness, has a history of vertigo.   - High BP sx: Denies headache, visual blurring, chest pain, SOB, leg swelling    Back pain and gait instability  - Patient notes that his back \"went out\" two months ago  - Started using the walker for support  - Feels more confident walking around outside with the walker.   - Had been using a cane for about a year  - Called a DME supply company, who noted that he would qualify for a four wheel walker.     Hearing loss  - Hasn't ever been to an audiologist.   - Has been struggling with hearing loss for a couple of years, gradual loss in both ears.  - Would like to be evaluated to see if he needs hearing aids.      Review of Systems   Constitutional, HEENT, cardiovascular, pulmonary, gi and gu systems are negative, except as otherwise noted.      Objective    BP (!) 148/72   Pulse 72   Ht 1.74 m (5' 8.5\")   Wt 70.3 kg (155 lb)   BMI 23.22 kg/m    Body mass index is 23.22 kg/m .  Physical Exam     GENERAL: Healthy, alert and no distress  EYES: Eyes grossly normal to inspection.  No discharge or erythema, or obvious scleral/conjunctival abnormalities.  RESP: No audible wheeze, cough, or visible cyanosis.  No visible retractions or increased work of breathing.    SKIN: Visible skin clear. No significant rash, abnormal pigmentation or lesions.  NEURO: Cranial nerves grossly intact.  Mentation and speech appropriate for age.  PSYCH: Mentation appears normal, affect normal/bright, judgement and insight intact, normal speech and appearance well-groomed.            "

## 2023-06-06 NOTE — LETTER
June 12, 2023      Kevin Saldana  [address]  [place] [address]        Dear ,    We are writing to inform you of your test results.    At your last appointment, we checked your sodium levels, which came back improved and close to the normal range. I think it would be good to continue on your current fluid intake amount. Your kidney function has also slightly decreased, so we will continue to keep an eye on this. I saw that you have an audiology appointment in July, hopefully it goes well and you are able to get some hearing aids!     Plan to come back in around November/December to check in on your blood pressure again, or sooner if anything is wrong.        Resulted Orders   Basic metabolic panel   Result Value Ref Range    Sodium 134 (L) 136 - 145 mmol/L    Potassium 4.9 3.4 - 5.3 mmol/L    Chloride 99 98 - 107 mmol/L    Carbon Dioxide (CO2) 23 22 - 29 mmol/L    Anion Gap 12 7 - 15 mmol/L    Urea Nitrogen 25.2 (H) 8.0 - 23.0 mg/dL    Creatinine 1.54 (H) 0.67 - 1.17 mg/dL    Calcium 10.2 8.8 - 10.2 mg/dL    Glucose 107 (H) 70 - 99 mg/dL    GFR Estimate 43 (L) >60 mL/min/1.73m2      Comment:      eGFR calculated using 2021 CKD-EPI equation.       If you have any questions or concerns, please call the clinic at the number listed above.       Sincerely,      Benjamin Rosenstein, MD

## 2023-06-07 ENCOUNTER — TELEPHONE (OUTPATIENT)
Dept: FAMILY MEDICINE | Facility: CLINIC | Age: 88
End: 2023-06-07

## 2023-06-07 LAB
ANION GAP SERPL CALCULATED.3IONS-SCNC: 12 MMOL/L (ref 7–15)
BUN SERPL-MCNC: 25.2 MG/DL (ref 8–23)
CALCIUM SERPL-MCNC: 10.2 MG/DL (ref 8.8–10.2)
CHLORIDE SERPL-SCNC: 99 MMOL/L (ref 98–107)
CREAT SERPL-MCNC: 1.54 MG/DL (ref 0.67–1.17)
DEPRECATED HCO3 PLAS-SCNC: 23 MMOL/L (ref 22–29)
GFR SERPL CREATININE-BSD FRML MDRD: 43 ML/MIN/1.73M2
GLUCOSE SERPL-MCNC: 107 MG/DL (ref 70–99)
POTASSIUM SERPL-SCNC: 4.9 MMOL/L (ref 3.4–5.3)
SODIUM SERPL-SCNC: 134 MMOL/L (ref 136–145)

## 2023-06-07 NOTE — TELEPHONE ENCOUNTER
[facility] phone call message- general phone call:    Reason for call:     Patient call and would like to let Dr. Butler know that he just spoke with his insurance and that they will cover for the 4 perdue walker and they guarantee they will delivery it to his home.     Return call needed: No    OK to leave a message on voice mail? Yes    Primary language: English      needed? No    Call taken on June 7, 2023 at 4:05 PM by Unique Hendrickson

## 2023-06-08 ENCOUNTER — DOCUMENTATION ONLY (OUTPATIENT)
Dept: FAMILY MEDICINE | Facility: CLINIC | Age: 88
End: 2023-06-08
Payer: COMMERCIAL

## 2023-06-08 NOTE — PROGRESS NOTES
Name of Sender: [employer]  Phone # of Sender: [phone]  Fax #: [phone]  Reason for form: Walker Seat  Name of Provider to sign: Dr. Butler  Date Received: 6/8/23  Next Steps: Form placed in folder to be given to provider

## 2023-06-16 ENCOUNTER — MEDICAL CORRESPONDENCE (OUTPATIENT)
Dept: HEALTH INFORMATION MANAGEMENT | Facility: CLINIC | Age: 88
End: 2023-06-16
Payer: COMMERCIAL

## 2023-07-06 ENCOUNTER — OFFICE VISIT (OUTPATIENT)
Dept: AUDIOLOGY | Facility: CLINIC | Age: 88
End: 2023-07-06
Payer: COMMERCIAL

## 2023-07-06 DIAGNOSIS — H90.6 MIXED CONDUCTIVE AND SENSORINEURAL HEARING LOSS OF BOTH EARS: ICD-10-CM

## 2023-07-06 DIAGNOSIS — H90.A22 SENSORINEURAL HEARING LOSS (SNHL) OF LEFT EAR WITH RESTRICTED HEARING OF RIGHT EAR: Primary | ICD-10-CM

## 2023-07-06 PROCEDURE — 92557 COMPREHENSIVE HEARING TEST: CPT | Performed by: AUDIOLOGIST

## 2023-07-06 PROCEDURE — 92550 TYMPANOMETRY & REFLEX THRESH: CPT | Performed by: AUDIOLOGIST

## 2023-07-06 NOTE — PROGRESS NOTES
AUDIOLOGY REPORT    SUBJECTIVE:  Kevin Saldana is a 88 year old male who was seen in the Audiology Clinic at the [facility] for audiologic evaluation, referred by Benjamin Rosenstein, M.D. The patient reports gradual onset of hearing difficulty in both ears, with particular difficulty hearing on the phone. Back injury/gait instability reported in April 2023; now uses walker to ambulate with success (no reported history of falls). History of intermittent dizziness, treated via physical therapy. He denied true vertigo, otalgia, otorrhea, recent illness, tinnitus, noise exposure, or aural fullness.     OBJECTIVE:  Abuse Screening:  Do you feel unsafe at home or work/school? No  Do you feel threatened by someone? No  Does anyone try to keep you from having contact with others, or doing things outside of your home? No  Physical signs of abuse present? No     Fall Risk Screen:  1. Have you fallen two or more times in the past year? No  2. Have you fallen and had an injury in the past year? No    Is patient a fall risk? No; ambulated with walker today  Referral initiated: No  Fall Risk Assessment Completed by Audiology    Otoscopic exam indicates ears are clear of cerumen bilaterally; the right tympanic membrane appeared abnormal.     Pure Tone Thresholds assessed using conventional audiometry with good  reliability from 250-8000 Hz bilaterally using insert earphones and circumaural headphones.     RIGHT:  mild sloping to profound sensorineural hearing loss    LEFT:    mild sloping to severe sensorineural hearing loss    Tympanogram:    RIGHT: normal eardrum mobility    LEFT:   normal eardrum mobility    Reflexes for 1000 Hz (reported by stimulus ear):  RIGHT: Ipsilateral is absent at frequencies tested  RIGHT: Contralateral is absent at frequencies tested  LEFT:   Ipsilateral is absent at frequencies tested  LEFT:   Contralateral is absent at frequencies tested      Speech Reception Threshold:     RIGHT: 45 dB HL    LEFT:   55 dB HL  Word Recognition Score:     RIGHT: 80% at 90 dB HL using NU-6 recorded word list.    LEFT:   68% at 95 dB HL using NU-6 recorded word list.      ASSESSMENT:     ICD-10-CM    1. Sensorineural hearing loss (SNHL) of left ear with restricted hearing of right ear  H90.A22       2. Mixed conductive and sensorineural hearing loss of both ears  H90.6 Adult Audiology  Referral          Today s results were discussed with the patient in detail. Appropriate communication strategies were discussed at length, as were reasonable expectations regarding hearing at a distance, in noisy environments, or when attention is diverted elsewhere.      PLAN:  Patient was counseled regarding hearing loss and impact on communication. Patient is a candidate for binaural amplification at this time; however, reasonable expectations of benefit from hearing aids were discussed in view of good to poor word recognition ability. Mr. Saldaan was encouraged to check on his amplification benefits with his health insurance carrier, to determine his financial responsibility as well as where those benefits may be used.       ENT consult is recommended due to slight asymmetry in hearing ability between ears. Mr. Saldana should return for testing annually to monitor current hearing thresholds, or per medical management/patient concern. Wear hearing protection consistently in noise to preserve residual hearing sensitivity and to minimize the effects of tinnitus. Mr. Saldana expressed verbal understanding of this information and plan. Please call this clinic with questions regarding these results or recommendations.        Boby Khan., [facility]  [place] Licensed Audiologist [id number]

## 2023-08-21 DIAGNOSIS — I48.92 ATRIAL FLUTTER BY ELECTROCARDIOGRAM (H): ICD-10-CM

## 2023-10-02 ENCOUNTER — ALLIED HEALTH/NURSE VISIT (OUTPATIENT)
Dept: FAMILY MEDICINE | Facility: CLINIC | Age: 88
End: 2023-10-02
Payer: COMMERCIAL

## 2023-10-02 DIAGNOSIS — Z23 NEED FOR PROPHYLACTIC VACCINATION AND INOCULATION AGAINST INFLUENZA: Primary | ICD-10-CM

## 2023-10-02 PROCEDURE — G0008 ADMIN INFLUENZA VIRUS VAC: HCPCS

## 2023-10-02 PROCEDURE — 90662 IIV NO PRSV INCREASED AG IM: CPT

## 2023-10-02 PROCEDURE — 99207 PR NO BILLABLE SERVICE THIS VISIT: CPT

## 2023-10-24 DIAGNOSIS — I10 BENIGN ESSENTIAL HYPERTENSION: ICD-10-CM

## 2023-10-24 RX ORDER — LISINOPRIL 20 MG/1
20 TABLET ORAL DAILY
Qty: 90 TABLET | Refills: 1 | Status: SHIPPED | OUTPATIENT
Start: 2023-10-24 | End: 2024-02-27

## 2023-10-24 NOTE — TELEPHONE ENCOUNTER
[facility] phone call message- medication clarification/question:    Full Medication Name: lisinopril (ZESTRIL) 20 MG tablet     Question: patient came into office asking medication above to be refilled. Patient would like id medication can be sent as soon as possible. Once sent please have someone on the team call and let the him know. Thank you    Pharmacy confirmed as [id number] - [place], [place] - [address] AT [facility]: Yes    OK to leave a message on voice mail? Yes    Primary language: English      needed? No    Call taken on October 24, 2023 at 10:26 AM by Gabriella Chowdhury

## 2024-02-27 ENCOUNTER — OFFICE VISIT (OUTPATIENT)
Dept: FAMILY MEDICINE | Facility: CLINIC | Age: 89
End: 2024-02-27
Payer: COMMERCIAL

## 2024-02-27 VITALS
DIASTOLIC BLOOD PRESSURE: 55 MMHG | TEMPERATURE: 98.1 F | BODY MASS INDEX: 24.4 KG/M2 | HEART RATE: 75 BPM | SYSTOLIC BLOOD PRESSURE: 104 MMHG | WEIGHT: 161 LBS | RESPIRATION RATE: 20 BRPM | OXYGEN SATURATION: 95 % | HEIGHT: 68 IN

## 2024-02-27 DIAGNOSIS — I48.92 ATRIAL FLUTTER BY ELECTROCARDIOGRAM (H): ICD-10-CM

## 2024-02-27 DIAGNOSIS — Z13.220 SCREENING FOR HYPERLIPIDEMIA: ICD-10-CM

## 2024-02-27 DIAGNOSIS — K59.00 CONSTIPATION, UNSPECIFIED CONSTIPATION TYPE: ICD-10-CM

## 2024-02-27 DIAGNOSIS — I10 BENIGN ESSENTIAL HYPERTENSION: Primary | ICD-10-CM

## 2024-02-27 DIAGNOSIS — N18.31 STAGE 3A CHRONIC KIDNEY DISEASE (H): ICD-10-CM

## 2024-02-27 LAB — HGB BLD-MCNC: 10.7 G/DL (ref 13.3–17.7)

## 2024-02-27 PROCEDURE — 80048 BASIC METABOLIC PNL TOTAL CA: CPT

## 2024-02-27 PROCEDURE — 36415 COLL VENOUS BLD VENIPUNCTURE: CPT

## 2024-02-27 PROCEDURE — 80061 LIPID PANEL: CPT

## 2024-02-27 PROCEDURE — 85018 HEMOGLOBIN: CPT

## 2024-02-27 PROCEDURE — 99214 OFFICE O/P EST MOD 30 MIN: CPT | Mod: GC

## 2024-02-27 RX ORDER — POLYETHYLENE GLYCOL 3350 17 G/17G
17 POWDER, FOR SOLUTION ORAL 2 TIMES DAILY
Qty: 2580 G | Refills: 4 | Status: SHIPPED | OUTPATIENT
Start: 2024-02-27 | End: 2024-06-24

## 2024-02-27 RX ORDER — LISINOPRIL 20 MG/1
20 TABLET ORAL DAILY
Qty: 90 TABLET | Refills: 3 | Status: SHIPPED | OUTPATIENT
Start: 2024-02-27 | End: 2024-03-18

## 2024-02-27 RX ORDER — METOPROLOL TARTRATE 100 MG
100 TABLET ORAL 2 TIMES DAILY
Qty: 180 TABLET | Refills: 3 | Status: SHIPPED | OUTPATIENT
Start: 2024-02-27

## 2024-02-27 RX ORDER — AMLODIPINE BESYLATE 5 MG/1
5 TABLET ORAL AT BEDTIME
Qty: 90 TABLET | Refills: 3 | Status: SHIPPED | OUTPATIENT
Start: 2024-02-27

## 2024-02-27 NOTE — PROGRESS NOTES
Preceptor Attestation:   Patient seen, evaluated and discussed with the resident. I have verified the content of the note, which accurately reflects my assessment of the patient and the plan of care.  Supervising Physician:Kylah Choi MD  [facility]

## 2024-02-27 NOTE — PROGRESS NOTES
Assessment & Plan     Benign essential hypertension  Patient presents for hypertension follow-up. Blood pressure is at goal today in clinic. Current medications include: amlodipine 5 mg and lisinopril 20 mg. No changes to regimen made today.   - amLODIPine (NORVASC) 5 MG tablet  Dispense: 90 tablet; Refill: 3  - lisinopril (ZESTRIL) 20 MG tablet  Dispense: 90 tablet; Refill: 3    Stage 3a chronic kidney disease (H)  History of stage 3 CKD, GFR ranging between 45-55 over the past couple of years.   - Hemoglobin  - Basic metabolic panel    Screening for hyperlipidemia  Cholesterol levels normal last year.   - Lipid panel reflex to direct LDL Non-fasting    Atrial flutter by electrocardiogram (H)  Patient with history of atrial flutter, has tolerated apixaban without issue. Currently rate controlled on current dose of metprolol. Normal rate and rhythm on exam today. Denies syncopal symptoms.  - apixaban ANTICOAGULANT (ELIQUIS) 5 MG tablet  Dispense: 180 tablet; Refill: 3  - metoprolol tartrate (LOPRESSOR) 100 MG tablet  Dispense: 180 tablet; Refill: 3    Constipation, unspecified constipation type  History of constipation, currently has regular bowel regimen with daily use of Miralax. Discussed how to titrate Miralax use if he finds himself to be stooling too frequently.   - polyethylene glycol (MIRALAX) 17 GM/Dose powder  Dispense: 2580 g; Refill: 4    Recommended follow-up with Medicare Wellness Exam, but patient declines at this time.     Zoë Brown is a 88 year old, presenting for the following health issues:  RECHECK (BP)        2/27/2024    10:38 AM   Additional Questions   Roomed by Trevor   Accompanied by PRASHANTH         2/27/2024    Information    services provided? No     HPI     No concerns today    HTN follow up  - Medications: amlodipine 5 mg and lisinopril 20 mg   - Any side effects/intolerance: No   - Diet: Cut out a lot of sweets. Likes to do frozen dinners, nutrigrain bars.  "  - Exercise: Got a new walker, more mobile.   - Home BP values: Doesn't check blood pressure at home.   - Preventative Meds:   - Low BP sx: Denies lightheadedness, orthostatic hypotension  - High BP sx: Denies headache, visual blurring, chest pain, SOB, leg swelling  - Lives in an apartment, goes up 30 stairs. Handling it okay. Really likes his apartment, just a regular apartment.   - Brother in law takes him to get groceries. People will help him carry groceries.     Constipation  - Bowel movements daily  - No straining  - Takes Miralax daily     Review of Systems  Constitutional, HEENT, cardiovascular, pulmonary, gi and gu systems are negative, except as otherwise noted.      Objective    /55   Pulse 75   Temp 98.1  F (36.7  C)   Resp 20   Ht 1.727 m (5' 8\")   Wt 73 kg (161 lb)   SpO2 95%   BMI 24.48 kg/m    Body mass index is 24.48 kg/m .  Physical Exam   GENERAL: alert and no distress  EYES: Eyes grossly normal to inspection, PERRL and conjunctivae and sclerae normal  RESP: lungs clear to auscultation - no rales, rhonchi or wheezes  CV: regular rate and rhythm  MS: no gross musculoskeletal defects noted, no edema  SKIN: no suspicious lesions or rashes  PSYCH: mentation appears normal, affect normal/bright        Signed Electronically by: Bernice Butler MD  "

## 2024-02-28 LAB
ANION GAP SERPL CALCULATED.3IONS-SCNC: 8 MMOL/L (ref 7–15)
BUN SERPL-MCNC: 20.5 MG/DL (ref 8–23)
CALCIUM SERPL-MCNC: 9.7 MG/DL (ref 8.8–10.2)
CHLORIDE SERPL-SCNC: 97 MMOL/L (ref 98–107)
CHOLEST SERPL-MCNC: 130 MG/DL
CREAT SERPL-MCNC: 1.65 MG/DL (ref 0.67–1.17)
DEPRECATED HCO3 PLAS-SCNC: 25 MMOL/L (ref 22–29)
EGFRCR SERPLBLD CKD-EPI 2021: 40 ML/MIN/1.73M2
FASTING STATUS PATIENT QL REPORTED: NORMAL
GLUCOSE SERPL-MCNC: 116 MG/DL (ref 70–99)
HDLC SERPL-MCNC: 42 MG/DL
LDLC SERPL CALC-MCNC: 72 MG/DL
NONHDLC SERPL-MCNC: 88 MG/DL
POTASSIUM SERPL-SCNC: 5.5 MMOL/L (ref 3.4–5.3)
SODIUM SERPL-SCNC: 130 MMOL/L (ref 135–145)
TRIGL SERPL-MCNC: 79 MG/DL

## 2024-02-29 DIAGNOSIS — E87.5 HYPERKALEMIA: Primary | ICD-10-CM

## 2024-02-29 NOTE — RESULT ENCOUNTER NOTE
Team - please call patient with results and help him get scheduled for a lab only visit (2/29 or 3/1) to recheck his BMP (order placed).     Orquidea Brown,    Thanks for coming in for a visit the other day. We checked a few labs at that appointment.     1. Your electrolyte labs are a little off. Your sodium is slightly low, which is has been in the past. However, your potassium is high. I want to have you come back for a potassium recheck by Friday to make sure it isn't getting higher. You can come in just for the lab and we will let you know the results and what to do next.     2. Your kidney function is slightly worse than when we checked it a year ago. This is likely gradual worsening of your chronic kidney disease.    3. Your cholesterol looks normal.     Please let us know if you have questions!    Bernice Butler MD  [facility], PGY-3

## 2024-03-07 ENCOUNTER — LAB (OUTPATIENT)
Dept: LAB | Facility: CLINIC | Age: 89
End: 2024-03-07
Payer: COMMERCIAL

## 2024-03-07 DIAGNOSIS — E87.5 HYPERKALEMIA: ICD-10-CM

## 2024-03-07 PROCEDURE — 80048 BASIC METABOLIC PNL TOTAL CA: CPT

## 2024-03-07 PROCEDURE — 36415 COLL VENOUS BLD VENIPUNCTURE: CPT

## 2024-03-08 ENCOUNTER — TELEPHONE (OUTPATIENT)
Dept: FAMILY MEDICINE | Facility: CLINIC | Age: 89
End: 2024-03-08
Payer: COMMERCIAL

## 2024-03-08 LAB
ANION GAP SERPL CALCULATED.3IONS-SCNC: 8 MMOL/L (ref 7–15)
BUN SERPL-MCNC: 21.2 MG/DL (ref 8–23)
CALCIUM SERPL-MCNC: 9.8 MG/DL (ref 8.8–10.2)
CHLORIDE SERPL-SCNC: 97 MMOL/L (ref 98–107)
CREAT SERPL-MCNC: 1.6 MG/DL (ref 0.67–1.17)
DEPRECATED HCO3 PLAS-SCNC: 27 MMOL/L (ref 22–29)
EGFRCR SERPLBLD CKD-EPI 2021: 41 ML/MIN/1.73M2
GLUCOSE SERPL-MCNC: 101 MG/DL (ref 70–99)
POTASSIUM SERPL-SCNC: 5.4 MMOL/L (ref 3.4–5.3)
SODIUM SERPL-SCNC: 132 MMOL/L (ref 135–145)

## 2024-03-08 NOTE — TELEPHONE ENCOUNTER
----- Message from Candi Cardenas MD sent at 3/8/2024 12:48 PM CST -----  Please call Kevin Saldana with results; and help get him schedule for a BP check and BMP for about a week with Dr. Butler or anyone on her team.    Hi Kevin Saldana,     Thank you for visiting our clinic on Mar 7, 2024!    Your results have returned. I am Dr. Cardenas covering for Dr. Butler while she is on vacation. Your potassium is a little bit high still. Looking at your last visit it looks like your blood pressure was a bit on the lower end so we have room to hold your lisinopril.     I would like you to hold Lisinopril for now and then return in a week to have your blood pressure checked and repeat labwork with Dr. Butler.     If you have any questions or concerns, please feel free to give us a call!      Thanks,  Candi Cardenas MD  [facility]

## 2024-03-08 NOTE — RESULT ENCOUNTER NOTE
Please call Kevin Saldana with results; and help get him schedule for a BP check and BMP for about a week with Dr. Butler or anyone on her team.    Hi Kevin Saldana,     Thank you for visiting our clinic on Mar 7, 2024!    Your results have returned. I am Dr. Cardenas covering for Dr. Butler while she is on vacation. Your potassium is a little bit high still. Looking at your last visit it looks like your blood pressure was a bit on the lower end so we have room to hold your lisinopril.     I would like you to hold Lisinopril for now and then return in a week to have your blood pressure checked and repeat labwork with Dr. Butler.     If you have any questions or concerns, please feel free to give us a call!      Thanks,  Candi Cardenas MD  [facility]

## 2024-03-08 NOTE — TELEPHONE ENCOUNTER
Lvm for patient to call back and go over lab results. Also please help schedule follow up next week for HTN and advise patient to hold lisinopril, do not take before follow up. See note per Dr. Cardenas

## 2024-03-18 ENCOUNTER — OFFICE VISIT (OUTPATIENT)
Dept: FAMILY MEDICINE | Facility: CLINIC | Age: 89
End: 2024-03-18
Payer: COMMERCIAL

## 2024-03-18 VITALS
RESPIRATION RATE: 14 BRPM | BODY MASS INDEX: 24.18 KG/M2 | DIASTOLIC BLOOD PRESSURE: 56 MMHG | TEMPERATURE: 98.8 F | WEIGHT: 159 LBS | OXYGEN SATURATION: 99 % | SYSTOLIC BLOOD PRESSURE: 114 MMHG | HEART RATE: 53 BPM

## 2024-03-18 DIAGNOSIS — I10 BENIGN ESSENTIAL HYPERTENSION: ICD-10-CM

## 2024-03-18 DIAGNOSIS — E87.5 HYPERKALEMIA: Primary | ICD-10-CM

## 2024-03-18 DIAGNOSIS — H90.6 MIXED CONDUCTIVE AND SENSORINEURAL HEARING LOSS OF BOTH EARS: ICD-10-CM

## 2024-03-18 LAB
ANION GAP SERPL CALCULATED.3IONS-SCNC: 12 MMOL/L (ref 7–15)
BUN SERPL-MCNC: 22.6 MG/DL (ref 8–23)
CALCIUM SERPL-MCNC: 9.5 MG/DL (ref 8.8–10.2)
CHLORIDE SERPL-SCNC: 98 MMOL/L (ref 98–107)
CREAT SERPL-MCNC: 1.63 MG/DL (ref 0.67–1.17)
DEPRECATED HCO3 PLAS-SCNC: 23 MMOL/L (ref 22–29)
EGFRCR SERPLBLD CKD-EPI 2021: 41 ML/MIN/1.73M2
GLUCOSE SERPL-MCNC: 108 MG/DL (ref 70–99)
POTASSIUM SERPL-SCNC: 5 MMOL/L (ref 3.4–5.3)
SODIUM SERPL-SCNC: 133 MMOL/L (ref 135–145)

## 2024-03-18 PROCEDURE — 80048 BASIC METABOLIC PNL TOTAL CA: CPT

## 2024-03-18 PROCEDURE — 99213 OFFICE O/P EST LOW 20 MIN: CPT | Mod: GC

## 2024-03-18 PROCEDURE — 36415 COLL VENOUS BLD VENIPUNCTURE: CPT

## 2024-03-18 NOTE — PROGRESS NOTES
Preceptor Attestation:   Patient seen, evaluated and discussed with the resident. I have verified the content of the note, which accurately reflects my assessment of the patient and the plan of care.    Supervising Physician:Ines Sahni MD    [facility]

## 2024-03-18 NOTE — PATIENT INSTRUCTIONS
Stop taking your lisinopril. Keep taking the other blood pressure medication, the amlodipine.   Come back and see me in two weeks. We will check your blood pressure and blood work at that time.   We will have our clinic  call your insurance to see if hearing aids are covered

## 2024-03-18 NOTE — LETTER
March 21, 2024      Kevin Saldana  [address]  [place] [address]        Dear ,    We are writing to inform you of your test results.    Thanks for coming to clinic the other day. Your labs came back looking a little better than the last check I think they will look even better without the lisinopril, so make sure that you have stopped that medication. I will see you at the beginning of April.       Resulted Orders   Basic metabolic panel   Result Value Ref Range    Sodium 133 (L) 135 - 145 mmol/L      Comment:      Reference intervals for this test were updated on 09/26/2023 to more accurately reflect our healthy population. There may be differences in the flagging of prior results with similar values performed with this method. Interpretation of those prior results can be made in the context of the updated reference intervals.     Potassium 5.0 3.4 - 5.3 mmol/L    Chloride 98 98 - 107 mmol/L    Carbon Dioxide (CO2) 23 22 - 29 mmol/L    Anion Gap 12 7 - 15 mmol/L    Urea Nitrogen 22.6 8.0 - 23.0 mg/dL    Creatinine 1.63 (H) 0.67 - 1.17 mg/dL    GFR Estimate 41 (L) >60 mL/min/1.73m2    Calcium 9.5 8.8 - 10.2 mg/dL    Glucose 108 (H) 70 - 99 mg/dL       If you have any questions or concerns, please call the clinic at the number listed above.       Sincerely,      Bernice Butler MD

## 2024-03-18 NOTE — PROGRESS NOTES
Assessment & Plan     Hyperkalemia  Benign essential hypertension  Patient noted to have hyperkalemia to 5.5 on last check with soft blood pressure at last visit. With this combination, Kevin was instructed to stop taking his lisinopril but he did not do so due to confusion about instructions. He will stop taking his lisinopril today and return for repeat visit in two weeks to ensure that his potasium goes down and blood pressure remains stable.   - Stop lisinopril due to hyperkalemia  - Basic metabolic panel    Mixed conductive and sensorineural hearing loss of both ears  Patient was seen by audiology in July and was told that hearing aids would be helpful. At that time he was told to reach out to his insurance to see if they would be covered, but he needs help doing that. Reached out to clinic  to help with insurance inquiry for hearing aid coverage.     Return in about 2 weeks (around 4/1/2024).    Subjective   Kevin is a 88 year old, presenting for the following health issues:  RECHECK        3/18/2024    10:59 AM   Additional Questions   Roomed by chet queen         3/18/2024    Information    services provided? No     HPI     Hyperkalemia  Hypertension  - Patient was seen for hypertension follow-up on 2/27. BMP checked at that visit returned with elevated potassium at 5.5.   - BMP was rechecked a few days later, and continued to be elevated at 5.4. Patient was called, and lisinopril held due to hyperkalemia and normal blood pressure at last visit.   - Since last visit, Kevin has been taking his lisinopril. He didn't remember hearing that he was supposed to stop taking it.   - Blood pressure today is normal  - No chest pain, shortness of breath, fatigue.   - Eating and drinking normally. Drinks two glasses of water a day.     Review of Systems  Constitutional, HEENT, cardiovascular, pulmonary, gi and gu systems are negative, except as otherwise noted.      Objective     /56   Pulse 53   Temp 98.8  F (37.1  C) (Oral)   Resp 14   Wt 72.1 kg (159 lb)   SpO2 99%   BMI 24.18 kg/m    Body mass index is 24.18 kg/m .  Physical Exam   GENERAL: Healthy, alert and no distress  EYES: Eyes grossly normal to inspection.  No discharge or erythema, or obvious scleral/conjunctival abnormalities.  RESP: No audible wheeze, cough, or visible cyanosis.  No visible retractions or increased work of breathing.    SKIN: Visible skin clear. No significant rash, abnormal pigmentation or lesions.  NEURO: Cranial nerves grossly intact.  Mentation and speech appropriate for age.  PSYCH: Mentation appears normal, affect normal/bright, judgement and insight intact, normal speech and appearance well-groomed.        Signed Electronically by: Bernice Butler MD

## 2024-03-21 ENCOUNTER — PATIENT OUTREACH (OUTPATIENT)
Dept: CARE COORDINATION | Facility: CLINIC | Age: 89
End: 2024-03-21
Payer: COMMERCIAL

## 2024-03-21 NOTE — PROGRESS NOTES
Clinic Care Coordination Contact  Follow Up Progress Note      Assessment: I got a message from  about the pt. The pt was told that he qualifies for a hearing aide through his medical insurance. She wanted me to help reach out to [employer] to see if the hearing aide is covered. The pt is hard of hearing so, its makes it hard for him to call. I called [employer], they do cover hearing aide for the pt, once every five years.     I called the pt, but got his vm, so I left a vm that his hearing is covered by his insurance. If he has any questions to give me a call back.     Care Gaps:    Health Maintenance Due   Topic Date Due    ADVANCE CARE PLANNING  Never done    RSV VACCINE (Pregnancy & 60+) (1 - 1-dose 60+ series) Never done    MEDICARE ANNUAL WELLNESS VISIT  06/05/2019    MICROALBUMIN  08/02/2023    DTAP/TDAP/TD IMMUNIZATION (2 - Td or Tdap) 08/14/2023           Care Plans      Intervention/Education provided during outreach:               Plan:     Care Coordinator will follow up in

## 2024-04-01 NOTE — PROGRESS NOTES
"  Assessment & Plan     Hyperkalemia  Benign essential hypertension  Patient presents for follow-up after his lisinopril was discontinued with hyperkalemia noted. He has not been taking his lisinopril, has continued his amlodipine without issue. Blood pressure at goal today in clinic at 115/61; previously struggled with systolic pressures in the 150's-180's, so will continue amlodipine at this time. BMP returned with hyperkalemia improved. No further labs needed at this time.   - Lisinopril discontinued     Mixed conductive and sensorineural hearing loss of both ears  Patient was seen by audiology in the summer, recommended bilateral hearing aids. Social work in our clinic was able to reach out to his insurance and notes that he does have coverage for the hearing aids. Discussed with patient, who would like to get scheduled. Spent time during appointment today getting him an appointment with audiology in May.     Zoë Brown is a 88 year old, presenting for the following health issues:  RECHECK (lab)    HPI     Hyperkalemia follow-up  - Patient noted to have potassium elevation to 5.5, also with soft blood pressures  - At last visit, lisinopril was discontinued. Continues on amlodipine 5 mg.   - Blood pressure after lisinopril discontinuation: Normal here in clinic.   - ROS: Denies chest pain, shortness of breath, edema    Hearing aids  - Souk called Kevin's insurance, does qualify for coverage.   - Looking forward to having better hearing, has noticed worsening over the past year since he was last seen in audiology.   - Would like to see audiology again to have this set up    Review of Systems  Constitutional, HEENT, cardiovascular, pulmonary, gi and gu systems are negative, except as otherwise noted.      Objective    /61   Pulse 67   Temp 97.9  F (36.6  C)   Resp 20   Ht 1.727 m (5' 8\")   Wt 73 kg (161 lb)   SpO2 97%   BMI 24.48 kg/m    Body mass index is 24.48 kg/m .  Physical Exam "   GENERAL: alert and no distress. Very hard of hearing.   EYES: Eyes grossly normal to inspection, PERRL and conjunctivae and sclerae normal  RESP: No increased work of breathing   CV: regular rate and rhythm  MS: no gross musculoskeletal defects noted, no edema  PSYCH: mentation appears normal, affect normal/bright      Signed Electronically by: Bernice Butler MD

## 2024-04-02 ENCOUNTER — OFFICE VISIT (OUTPATIENT)
Dept: FAMILY MEDICINE | Facility: CLINIC | Age: 89
End: 2024-04-02
Payer: COMMERCIAL

## 2024-04-02 VITALS
SYSTOLIC BLOOD PRESSURE: 115 MMHG | RESPIRATION RATE: 20 BRPM | HEIGHT: 68 IN | HEART RATE: 67 BPM | OXYGEN SATURATION: 97 % | DIASTOLIC BLOOD PRESSURE: 61 MMHG | TEMPERATURE: 97.9 F | BODY MASS INDEX: 24.4 KG/M2 | WEIGHT: 161 LBS

## 2024-04-02 DIAGNOSIS — I10 BENIGN ESSENTIAL HYPERTENSION: ICD-10-CM

## 2024-04-02 DIAGNOSIS — E87.5 HYPERKALEMIA: Primary | ICD-10-CM

## 2024-04-02 DIAGNOSIS — H90.6 MIXED CONDUCTIVE AND SENSORINEURAL HEARING LOSS OF BOTH EARS: ICD-10-CM

## 2024-04-02 LAB
ANION GAP SERPL CALCULATED.3IONS-SCNC: 9 MMOL/L (ref 7–15)
BUN SERPL-MCNC: 19.5 MG/DL (ref 8–23)
CALCIUM SERPL-MCNC: 9.5 MG/DL (ref 8.8–10.2)
CHLORIDE SERPL-SCNC: 96 MMOL/L (ref 98–107)
CREAT SERPL-MCNC: 1.42 MG/DL (ref 0.67–1.17)
DEPRECATED HCO3 PLAS-SCNC: 26 MMOL/L (ref 22–29)
EGFRCR SERPLBLD CKD-EPI 2021: 48 ML/MIN/1.73M2
GLUCOSE SERPL-MCNC: 103 MG/DL (ref 70–99)
POTASSIUM SERPL-SCNC: 5 MMOL/L (ref 3.4–5.3)
SODIUM SERPL-SCNC: 131 MMOL/L (ref 135–145)

## 2024-04-02 PROCEDURE — 80048 BASIC METABOLIC PNL TOTAL CA: CPT

## 2024-04-02 PROCEDURE — 36415 COLL VENOUS BLD VENIPUNCTURE: CPT

## 2024-04-02 PROCEDURE — 99213 OFFICE O/P EST LOW 20 MIN: CPT | Mod: GC

## 2024-05-20 ENCOUNTER — OFFICE VISIT (OUTPATIENT)
Dept: AUDIOLOGY | Facility: CLINIC | Age: 89
End: 2024-05-20
Payer: COMMERCIAL

## 2024-05-20 DIAGNOSIS — H90.A22 SENSORINEURAL HEARING LOSS (SNHL) OF LEFT EAR WITH RESTRICTED HEARING OF RIGHT EAR: Primary | ICD-10-CM

## 2024-05-20 DIAGNOSIS — H61.22 IMPACTED CERUMEN OF LEFT EAR: Primary | ICD-10-CM

## 2024-05-20 PROCEDURE — 92567 TYMPANOMETRY: CPT | Performed by: AUDIOLOGIST

## 2024-05-20 PROCEDURE — 92591 PR HEARING AID EXAM BINAURAL: CPT | Performed by: AUDIOLOGIST

## 2024-05-20 NOTE — PROGRESS NOTES
AUDIOLOGY REPORT    SUBJECTIVE: Kevin Saldana is a 89 year old male was seen in the Audiology Clinic at  [facility] on 5/20/24 to discuss concerns with hearing and functional communication difficulties. He self-referred for today's appointment. Mr. Saldana has been seen in audiology previously on 7-6-23 for hearing evaluation, and results revealed mild, sloping to severe sensorineural hearing loss in the left ear, and mild, sloping to severe/profound sensorineural hearing loss in the right ear. He has medical assistance for coverage ([employer]) and has not yet received medical clearance for amplification, which is a requirement for this insurance carrier's coverage of amplification.    OBJECTIVE:  Abuse Screening:  Do you feel unsafe at home or work/school? No  Do you feel threatened by someone? No  Does anyone try to keep you from having contact with others, or doing things outside of your home? No  Physical signs of abuse present? No    Patient is a hearing aid candidate. Patient would like to move forward with a hearing aid evaluation today. Therefore, the patient was presented with different options for amplification to help aid in communication. Discussed styles, levels of technology and monaural vs. binaural fitting.     The hearing aid(s) mutually chosen were:  Binaural: Phonak Virto   COLOR: pink  BATTERY SIZE: 312/brown  EARMOLD/TIPS: not applicable  CANAL/ LENGTH:  length not applicable; power receivers are needed for both devices    Otoscopy revealed left ear was occluded with cerumen, right ear was clear. Right tympanic membrane appeared abnormal (possible perforation visualized); tympanometry was performed to evaluate middle ear function. Tympanometry results yielded normal static admittance, bilaterally, with normal and similar canal volumes between ears. These findings are consistent with intact tympanic membranes, bilaterally. While  the cerumen visualized in the left ear is not a complete occlusion, it is significant enough to prevent a good ear impression for the purpose of fabricating custom amplification. Impressions were deferred until cerumen can be safely removed by a physician.    ASSESSMENT:     ICD-10-CM    1. Sensorineural hearing loss (SNHL) of left ear with restricted hearing of right ear  H90.A22           Reviewed purchase information and warranty information with patient. The 45 day trial period was explained to patient. The patient was given a copy of the [place] [employer] consumer brochure on purchasing hearing instruments. Patient risk factors have been provided to the patient in writing prior to the sale of the hearing aid per FDA regulation. The risk factors are also available in the User Instructional Booklet to be presented on the day of the hearing aid fitting. Hearing aid(s) ordered. Hearing aid evaluation completed.    PLAN: Mr. Saldana is scheduled to return 6-27-23 for a hearing evaluation and for ear impressions to be taken. As his last audiogram was dated 7-6-23, new hearing evaluation results will be needed for hearing aid programming and fitting. Hearing aid fitting was scheduled for 7-18-24, and initial review was scheduled for 8-1-24. Mr. Saldana will follow up with Dr. Butler (primary care) for ear cleaning prior to 6-27-23, and medical clearance will also be requested from this provider. Purchase agreement will be completed at the hearing aid fitting appointment. Mr. Saldana expressed verbal understanding of this information and plan. Please contact this clinic with any questions or concerns.      Thomas Khan, [facility]-A  [place] Licensed Audiologist [phone]

## 2024-05-21 ENCOUNTER — TELEPHONE (OUTPATIENT)
Dept: FAMILY MEDICINE | Facility: CLINIC | Age: 89
End: 2024-05-21
Payer: COMMERCIAL

## 2024-05-21 NOTE — TELEPHONE ENCOUNTER
----- Message from Bernice Butler MD sent at 5/20/2024  4:46 PM CDT -----  Regarding: Need for ear cleaning  Hello!     Could you please call Kevin and get him set up for a cerumen cleaning visit? Maybe plan for the middle of June (needs to be completed prior to 6/27). Please also let him know that I will send over some wax softening drops for him to take in the five days prior to his appointment to make it easier to remove wax that day.     Thank you!  Bernice

## 2024-05-28 NOTE — TELEPHONE ENCOUNTER
Forms Request:  Today's Date: May 28, 2024   Form Type:  HEARING AID MEDICAL CLEARANCE ,   Where is the form from: AUDIOLOGY  How was form received: Fax  KEYLA on file: UNKNOWN   How is form being returned: Fax  Fax Number/Address: [phone]  PCP: KIM Jasso Team: Yellow Team  Form Given to: PCP/KEEP AT COLOR TEAMS DESK UNTIL APPT ON 6/24/24. FORM COMPLETE BEFORE 6/27/24

## 2024-06-24 ENCOUNTER — OFFICE VISIT (OUTPATIENT)
Dept: FAMILY MEDICINE | Facility: CLINIC | Age: 89
End: 2024-06-24
Payer: COMMERCIAL

## 2024-06-24 VITALS
WEIGHT: 151.04 LBS | TEMPERATURE: 98 F | OXYGEN SATURATION: 99 % | HEART RATE: 63 BPM | DIASTOLIC BLOOD PRESSURE: 70 MMHG | HEIGHT: 68 IN | RESPIRATION RATE: 18 BRPM | BODY MASS INDEX: 22.89 KG/M2 | SYSTOLIC BLOOD PRESSURE: 152 MMHG

## 2024-06-24 DIAGNOSIS — K59.00 CONSTIPATION, UNSPECIFIED CONSTIPATION TYPE: ICD-10-CM

## 2024-06-24 DIAGNOSIS — H61.23 BILATERAL IMPACTED CERUMEN: Primary | ICD-10-CM

## 2024-06-24 DIAGNOSIS — I10 BENIGN ESSENTIAL HYPERTENSION: ICD-10-CM

## 2024-06-24 PROCEDURE — 69209 REMOVE IMPACTED EAR WAX UNI: CPT

## 2024-06-24 PROCEDURE — 99213 OFFICE O/P EST LOW 20 MIN: CPT | Mod: 25

## 2024-06-24 RX ORDER — POLYETHYLENE GLYCOL 3350 17 G/17G
17 POWDER, FOR SOLUTION ORAL 2 TIMES DAILY
Qty: 3060 G | Refills: 3 | Status: SHIPPED | OUTPATIENT
Start: 2024-06-24

## 2024-06-24 RX ORDER — RESPIRATORY SYNCYTIAL VIRUS VACCINE 120MCG/0.5
0.5 KIT INTRAMUSCULAR ONCE
Qty: 1 EACH | Refills: 0 | Status: CANCELLED | OUTPATIENT
Start: 2024-06-24 | End: 2024-06-24

## 2024-06-24 RX ORDER — POLYETHYLENE GLYCOL 3350 17 G/17G
17 POWDER, FOR SOLUTION ORAL 2 TIMES DAILY
Qty: 3060 G | Refills: 0 | Status: SHIPPED | OUTPATIENT
Start: 2024-06-24 | End: 2024-06-24

## 2024-06-24 NOTE — PROGRESS NOTES
Assessment & Plan     Bilateral impacted cerumen  Patient presents today for ear cleaning. He is scheduled with audiology to be fitted for hearing aids, and at their last appointment was found to have bilaterally impacted ceremen. I sent over drops prior to this appointment which he was able to complete. Prior to cleaning today, noted soft wax bilaterally which resolved after cleaning procedure. Patient is looking forward to having proper hearing aids and is aware of the upcoming schedule to have this completed.     Benign essential hypertension  Patient's blood pressure is 152/70, had discontinued lisinopril back in March for hyperkalemia and soft BP. Although his blood pressure is slightly elevated today, with his age I think it is in the acceptable range. He does not check his blood pressure at home. Suggested that he return in a couple of months to recheck his blood pressure and repeat lab work, which he agrees to do.     Constipation, unspecified constipation type  Patient requesting refill of his Miralax today. Notes that this continues to work well, has regular bowel movements without straining.   - polyethylene glycol (MIRALAX) 17 GM/Dose powder  Dispense: 3060 g; Refill: 3    Return in two months to recheck blood pressure.     Zoë Brown is a 89 year old, presenting for the following health issues:  Ear Problem (Ears need cleaning)        6/24/2024     1:20 PM   Additional Questions   Roomed by January lutz   Accompanied by self         6/24/2024    Information    services provided? No      HPI     Ear cleaning  - Getting set up for hearing aids  - Has been struggling with his hearing for years.  - No ear pain  - No ear discharge  - Thinks he had his ears cleaned out years ago  - Finished course of ear drops prior to visit, had noticed some ear wax coming out in the shower earlier.     Hypertension  - Lisinopril discontinued back in March/April due to hyperkalemia and soft  "BP  - Has been continuing to take the amlodipine without issue       Objective    BP (!) 152/70 (BP Location: Right arm, Patient Position: Sitting, Cuff Size: Adult Regular)   Pulse 63   Temp 98  F (36.7  C)   Resp 18   Ht 1.733 m (5' 8.23\")   Wt 68.5 kg (151 lb 0.6 oz)   SpO2 99%   BMI 22.81 kg/m    Body mass index is 22.81 kg/m .  Physical Exam   GENERAL: alert and no distress  EYES: Eyes grossly normal to inspection, PERRL and conjunctivae and sclerae normal  HENT: normal cephalic/atraumatic, ear canals without trauma. Soft, non-occlusive ear wax noted bilaterally prior to washing. Post-procedure, left ear completely clean, right ear with small amount overlying TM.   RESP: No increased work of breathing noted.   MS: no gross musculoskeletal defects noted, no edema      Signed Electronically by: Bernice Butler MD    "

## 2024-06-26 ENCOUNTER — DOCUMENTATION ONLY (OUTPATIENT)
Dept: FAMILY MEDICINE | Facility: CLINIC | Age: 89
End: 2024-06-26
Payer: COMMERCIAL

## 2024-06-26 NOTE — PROGRESS NOTES
Forms Request:  Today's Date: June 26, 2024   Form Type:  HEARING AID MEDICAL CLEARANCE ,   Where is the form from: AUDIOLOGY  How was form received: Fax  KEYLA on file: UNKNOWN   How is form being returned: Fax  Fax Number/Address: [phone]  PCP: Bernice Jasso Team: Yellow Team  Form Given to: KIM DESK    Date form completed: 6/26/24  Form sent via: Fax  Date faxed or mailed: 6/27/24  Fax # or Address: [phone]  Completed by: Jose Suresh MA

## 2024-06-27 ENCOUNTER — OFFICE VISIT (OUTPATIENT)
Dept: AUDIOLOGY | Facility: CLINIC | Age: 89
End: 2024-06-27
Payer: COMMERCIAL

## 2024-06-27 DIAGNOSIS — H90.A22 SENSORINEURAL HEARING LOSS (SNHL) OF LEFT EAR WITH RESTRICTED HEARING OF RIGHT EAR: Primary | ICD-10-CM

## 2024-06-27 PROCEDURE — 92550 TYMPANOMETRY & REFLEX THRESH: CPT | Mod: 52 | Performed by: AUDIOLOGIST

## 2024-06-27 PROCEDURE — 92557 COMPREHENSIVE HEARING TEST: CPT | Performed by: AUDIOLOGIST

## 2024-06-27 NOTE — PROGRESS NOTES
AUDIOLOGY REPORT    SUBJECTIVE:  Kevin Saldana is a 89 year old male who was seen in the Audiology Clinic at the [facility] for audiologic evaluation, referred by Bernice Butler M.D. The patient has been seen previously in this clinic on 7-6-23 for assessment and results indicated mild to severe/profound sensorineural hearing loss, with left ear somewhat worse than the right. He is scheduled for hearing aid fitting 7-18-24; medical clearance is still pending from primary care. Cerumen removal occurred within the past week. He denied tinnitus, dizziness, aural fullness, otalgia, otorrhea, recent illness, or history of significant noise exposure.     OBJECTIVE:  Abuse Screening:  Do you feel unsafe at home or work/school? No  Do you feel threatened by someone? No  Does anyone try to keep you from having contact with others, or doing things outside of your home? No  Physical signs of abuse present? No     Fall Risk Screen:  1. Have you fallen two or more times in the past year? No  2. Have you fallen and had an injury in the past year? No  Patient ambulated using a walker today.    Otoscopic exam indicates ears are clear of cerumen, bilaterally.    Pure Tone Thresholds assessed using conventional audiometry with good  reliability from 250-8000 Hz bilaterally using insert earphones and circumaural headphones.     RIGHT:  mild sloping to profound sensorineural hearing loss    LEFT:    mild sloping to severe sensorineural hearing loss    Tympanogram:    RIGHT: normal/hypermobile eardrum mobility    LEFT:   normal eardrum mobility    Reflexes for 1000 Hz (reported by stimulus ear):  RIGHT: Ipsilateral is absent at frequencies tested  RIGHT: Contralateral could not be assessed due to equipment issues  LEFT:   Ipsilateral is present at normal levels  LEFT:   Contralateral could not be assessed due to equipment issues    Speech Reception Threshold:    RIGHT: 50 dB HL    LEFT:   60 dB HL  Word  Recognition Score:     RIGHT: 84% at 90 dB HL using NU-6 recorded word list.    LEFT:   56% at 100 dB HL using NU-6 recorded word list.      ASSESSMENT:     ICD-10-CM    1. Sensorineural hearing loss (SNHL) of left ear with restricted hearing of right ear  H90.A22           Compared to patient's previous audiogram dated 7-6-23, hearing thresholds and word recognition ability are essentially stable, bilaterally. Today s results were discussed with the patient in detail.     Mr. Saldana indicated that he wanted to discuss type of hearing aids to be ordered once more; he had previously chosen custom full shell devices and was slated for ear impressions to be done today. However, he indicated he would prefer EVELYN devices. His Quality of Service Commitment form was updated to reflect this choice, and a copy was given to him. It will also be resubmitted for scanning into the record. No ear impressions were taken today.    PLAN:  Mr. Saldana should return for hearing evaluation annually to monitor current hearing thresholds and to keep programming in amplification up to date. Wear hearing protection consistently in noise to preserve residual hearing sensitivity and to minimize the effects of noise on tinnitus. He is currently scheduled for hearing aid fitting on 7-18-24 (pending medical clearance), and he expressed verbal understanding of this information and plan. Please call this clinic with questions regarding these results or recommendations.        Boby Khan., [facility]-A  [place] Licensed Audiologist [id number]

## 2024-07-18 ENCOUNTER — OFFICE VISIT (OUTPATIENT)
Dept: AUDIOLOGY | Facility: CLINIC | Age: 89
End: 2024-07-18
Payer: COMMERCIAL

## 2024-07-18 DIAGNOSIS — H90.A22 SENSORINEURAL HEARING LOSS (SNHL) OF LEFT EAR WITH RESTRICTED HEARING OF RIGHT EAR: Primary | ICD-10-CM

## 2024-07-18 PROCEDURE — V5011 HEARING AID FITTING/CHECKING: HCPCS | Performed by: AUDIOLOGIST

## 2024-07-18 PROCEDURE — V5261 HEARING AID, DIGIT, BIN, BTE: HCPCS | Performed by: AUDIOLOGIST

## 2024-07-18 PROCEDURE — V5160 DISPENSING FEE BINAURAL: HCPCS | Performed by: AUDIOLOGIST

## 2024-07-18 PROCEDURE — V5020 CONFORMITY EVALUATION: HCPCS | Mod: RT | Performed by: AUDIOLOGIST

## 2024-07-18 NOTE — PATIENT INSTRUCTIONS

## 2024-07-18 NOTE — PROGRESS NOTES
AUDIOLOGY REPORT    SUBJECTIVE: Kevin Saldana, a 89 year old male, was seen in the Audiology Clinic at [facility] today for a Binaural hearing aid fitting. Previous results have revealed a bilateral mild, sloping to severe sensorineural hearing loss, slightly worse in the left ear than in the right. The patient was given medical clearance to pursue amplification by Bernice Butler MD.    OBJECTIVE:  Prior to fitting, a hearing aid check was performed to ensure device functionality. The hearing aid conformity evaluation was completed.The hearing aids were placed and they provided a good fit. Real-ear-probe-microphone measurements were completed on the [employer] system and were a good match to NAL-NL2 target with soft sounds audible, moderate sounds comfortable, and loud sounds below discomfort. UCLs are verified through maximum power output measures and demonstrate appropriate limiting of loud inputs. Mr. Saldana was oriented to proper hearing aid use, care, cleaning (no water, dry brush), use of , aid insertion/removal, user booklet, warranty information, storage cases, and other hearing aid details. The patient confirmed understanding of hearing aid use and care, and showed proper insertion of hearing aid and  use while in the office today. Mr. Saldana reported good volume and sound quality today. No Bluetooth pairing was completed today.    EAR(S) FIT: Binaural  MA HEARING AID MAKE: Right: Phonak; Left: Phonak    MA HEARING AID MODEL #: Right: Slim L70-R; Left: Slim L70-R  HEARING AID STYLE: Right: EVELYN; Left: EVELYN  DOME SIZE: Right:  small vented; Left::  small vented   LENGTH: Right:  2P; Left:  2P  SERIAL NUMBERS: Right: [id number]; Left: [id number]  WARRANTY END DATE: Right: 8/6/2027; Left:: 8/6/2027      Cost of devices $4718.00        ASSESSMENT: Binaural hearing aid fitting completed today. Verification measures were performed. The 45 day trial period was explained  to patient, and they expressed understanding. Mr. Saldana signed the Hearing Aid Purchase Agreement and was given a copy, as well as details on his hearing aids. Patient was counseled that exact out of pocket amounts cannot be determined for hearing aid claims being sent to insurance. Any insurance coverage information presented to the patient is an estimate only, and is not a guarantee of payment. Patient has been advised to check with their own insurance.    PLAN: Mr. Saldana will return for follow-up 8-1-24 for a hearing aid review appointment. Mr. Saldana expressed verbal understanding of this information and plan. Please call this clinic with questions regarding today s appointment.    Thomas Khan, [facility]-A  [place] Licensed Audiologist [phone]

## 2024-08-01 ENCOUNTER — OFFICE VISIT (OUTPATIENT)
Dept: AUDIOLOGY | Facility: CLINIC | Age: 89
End: 2024-08-01
Payer: COMMERCIAL

## 2024-08-01 DIAGNOSIS — H90.A22 SENSORINEURAL HEARING LOSS (SNHL) OF LEFT EAR WITH RESTRICTED HEARING OF RIGHT EAR: Primary | ICD-10-CM

## 2024-08-01 PROCEDURE — V5299 HEARING SERVICE: HCPCS | Performed by: AUDIOLOGIST

## 2024-08-01 NOTE — PROGRESS NOTES
AUDIOLOGY REPORT    SUBJECTIVE:Kevin Saldana is a 89 year old male who was seen in the Audiology Clinic at the [facility] on 8/1/2024  for a follow-up check regarding the fitting of new hearing aids. Previous results have revealed mild, sloping to severe sensorineural hearing loss, bilaterally. The patient has been seen previously in this clinic and was fit binaurally with Phonak Slim L70-R EVELYN devices on 7-18-24. Mr. Saldana reported the ability to hear sounds in his home environment that he has not heard for a while or ever, including hearing the ringer on his phone. His conversational responses were observed to be markedly improved today when he was wearing his devices versus when he was not.     OBJECTIVE:   Datalogging indicated average daily wear of 13.6 hours over the past fourteen days. Targets were at 100% in both devices. Manual volume increases were noted across all programs; these adaptations were applied. Mr. Saldana noted some difficulty with retention of the  in his left ear only; dome size in the left ear was changed from small vented to medium vented and improvement in retention as well as comfort were noted.     The process of removing the dome and exchanging the wax protection was demonstrated on one device; Mr. Saldana was able to perform this task independently on the second device.    Reviewed 45 day trial period, care, cleaning (no water, dry brush), use of , insertion/removal, toxicity, low-battery signal), aid insertion/removal, volume adjustment, user booklet, warranty information, storage cases, and other hearing aid details.     No charge visit today (in warranty hearing aid check).    ASSESSMENT: A follow-up appointment for hearing aid fitting was completed today. Changes to hearing aid were completed as outlined above.     PLAN: Mr. Saldana will return for follow-up as needed. His trial period ends 9-7-24, and he is aware that any  exchanges/returns need to be completed in writing prior to that date. The drop off procedure for items requiring repair and methods for requesting hearing aid supplies were discussed. Mr. Saldana expressed verbal understanding of this information and plan. Please call this clinic with any questions regarding today s appointment.    Thomas Khan, [facility]-A  [place] Licensed Audiologist [phone]

## 2024-09-13 ENCOUNTER — OFFICE VISIT (OUTPATIENT)
Dept: FAMILY MEDICINE | Facility: CLINIC | Age: 89
End: 2024-09-13
Payer: COMMERCIAL

## 2024-09-13 VITALS
SYSTOLIC BLOOD PRESSURE: 169 MMHG | OXYGEN SATURATION: 99 % | TEMPERATURE: 99.6 F | RESPIRATION RATE: 20 BRPM | DIASTOLIC BLOOD PRESSURE: 79 MMHG | HEART RATE: 77 BPM

## 2024-09-13 DIAGNOSIS — R33.9 URINARY RETENTION: Primary | ICD-10-CM

## 2024-09-13 DIAGNOSIS — N30.00 ACUTE CYSTITIS WITHOUT HEMATURIA: ICD-10-CM

## 2024-09-13 PROCEDURE — 99213 OFFICE O/P EST LOW 20 MIN: CPT | Mod: GC

## 2024-09-13 NOTE — PROGRESS NOTES
Assessment & Plan     Urinary retention  Acute cystitis without hematuria  Kevin is a pleasant 89 year old gentleman here today for follow up from ED visit 9/13. He was seen yesterday for acute urinary retention, found to have acute cystitis and started on Keflex. Does have hx of BPH and urinary retention w/ UTI, reports this last occurred >10 years ago however. No chronic restrepo, no restrepo in currently. Has been voiding spontaneously since discharge from the ED. No adverse effects from abx or adherence concerns. Urged him to complete the full course of this and return to the ED with any recurrent retention to have restrepo placed.    No follow-ups on file.    Subjective   Kevin is a 89 year old, presenting for the following health issues:  RECHECK (BP)        9/13/2024    10:58 AM   Additional Questions   Roomed by Reid   Accompanied by self         9/13/2024    Information    services provided? No        HPI   89M w/ pmh of essential htn, bph    ED follow up  - Seen in the ED yesterday for urinary frequency, urgency, incomplete voiding. Hx of urinary retention last 12 years ago  - UA appeared infectious and so started on Keflex  - Has been able to urinate several times     BP check  - Currently prescribed Amlodipine 5 mg BID  - Occasionally checks BPs at home, reports recent lows, unsure how low they were  - Elevated today to 169/79 on recheck    Review of Systems  Constitutional, HEENT, cardiovascular, pulmonary, gi and gu systems are negative, except as otherwise noted.        Objective    BP (!) 169/79 (BP Location: Right arm, Patient Position: Sitting)   Pulse 77   Temp 99.6  F (37.6  C) (Oral)   Resp 20   SpO2 99%   There is no height or weight on file to calculate BMI.  Physical Exam   GENERAL: alert and no distress  NECK: no adenopathy, no asymmetry, masses, or scars  RESP: lungs clear to auscultation - no rales, rhonchi or wheezes  CV: regular rate and rhythm, normal S1 S2, no S3  or S4, no murmur, click or rub, no peripheral edema  ABDOMEN: soft, nontender, no hepatosplenomegaly, no masses and bowel sounds normal  MS: no gross musculoskeletal defects noted, no edema          Signed Electronically by: Goyo Lofton MD

## 2024-09-13 NOTE — PROGRESS NOTES
ER follow-up  Cystitis, urinary retention?  BP, labs    PCP Dr. Garcia, was [facility]\"".  Last visit 4/2/24

## 2024-09-19 NOTE — PROGRESS NOTES
Preceptor Attestation:   Patient seen, evaluated and discussed with the resident. I have verified the content of the note, which accurately reflects my assessment of the patient and the plan of care.    Supervising Physician:Zehra Harris MD    [facility]

## 2024-09-25 ENCOUNTER — TELEPHONE (OUTPATIENT)
Dept: AUDIOLOGY | Facility: CLINIC | Age: 89
End: 2024-09-25
Payer: COMMERCIAL

## 2024-09-25 NOTE — TELEPHONE ENCOUNTER
I called Kevin back, and let him know that I will be mailing him some of the Phonak cerustop wax traps to his address on file.  He was happy about this.    Cyndi Huynh  Audiology Assistant

## 2024-09-25 NOTE — TELEPHONE ENCOUNTER
[facility] Call Center    Phone Message    May a detailed message be left on voicemail: yes     Reason for Call: Other: Pt is requesting more sticks for his hearing aids. States he was advised to call in if needed.      Per protocol writer to send te for hearing aid supplies.    Action Taken: Message routed to:  [facility] eye    Travel Screening: Not Applicable     Date of Service:

## 2024-10-10 ENCOUNTER — OFFICE VISIT (OUTPATIENT)
Dept: FAMILY MEDICINE | Facility: CLINIC | Age: 89
End: 2024-10-10
Payer: COMMERCIAL

## 2024-10-10 VITALS
OXYGEN SATURATION: 98 % | RESPIRATION RATE: 22 BRPM | DIASTOLIC BLOOD PRESSURE: 52 MMHG | TEMPERATURE: 98.6 F | SYSTOLIC BLOOD PRESSURE: 96 MMHG | WEIGHT: 151 LBS | HEIGHT: 68 IN | BODY MASS INDEX: 22.88 KG/M2 | HEART RATE: 70 BPM

## 2024-10-10 DIAGNOSIS — K59.00 CONSTIPATION, UNSPECIFIED CONSTIPATION TYPE: ICD-10-CM

## 2024-10-10 DIAGNOSIS — N18.31 STAGE 3A CHRONIC KIDNEY DISEASE (H): Primary | ICD-10-CM

## 2024-10-10 DIAGNOSIS — I10 BENIGN ESSENTIAL HYPERTENSION: ICD-10-CM

## 2024-10-10 DIAGNOSIS — R33.9 URINARY RETENTION: ICD-10-CM

## 2024-10-10 PROCEDURE — 99214 OFFICE O/P EST MOD 30 MIN: CPT | Mod: GC

## 2024-10-10 RX ORDER — TAMSULOSIN HYDROCHLORIDE 0.4 MG/1
0.4 CAPSULE ORAL DAILY
COMMUNITY
Start: 2024-09-26 | End: 2025-09-26

## 2024-10-10 NOTE — LETTER
10/10/2024    Kevin Saldana   1935        To Whom it May Concern;    Please consider excusing Kevin Saldana from Jury duty as it would be a major inconvenience and burden to him due to his medical conditions.    Sincerely,        Josh Garcia MD

## 2024-10-10 NOTE — PROGRESS NOTES
"  Assessment & Plan     Kevin is a 89 year old gentleman with PMHx HTN, BPH (s/p TURP), urinary retention, atrial flutter, constipation, recently seen in ED for UTI that is now resolved and by Urology for retention and was started on tamsulosin.    Provided letter asking court to consider excusing him from Jury duty, due to age, and needing strict adherence to medications, and need for BM when feeling the urge.    Urinary retention  Urinating with less difficulty on tamsulosin, still a little slow sometimes.  No dysuria. Taking tamsulosin as prescribed.  - Follow-up in 2-3 months for annual wellness exam    Stage 3 chronic kidney disease (H)  Cr checked last month and was stable    Benign essential hypertension  Home BP at goal.  Continue current meds    Constipation, unspecified constipation type  Takes mirilax 2X daily otherwise gets severe constipation.  It does cause a need for BM afterwards though, and is the main reason he does not think he can do Jury duty.        Subjective   Kevin is a 89 year old, presenting for the following health issues:  RECHECK (urine) and Patient Request for Note/Letter (Needs a letter for juror duty- not able to due urine issue)    Recently seen urology, started on tamsulosin 0.4mg    Urinating has been better now, little slow sometimes  No burning or pain  No blood in urine    Taking amlodipine and metoprolol for BP    On apixaban for atrial flutter    Takes mirilax every morning, makes him go, can't do jury duty because of this          Objective    BP 96/52   Pulse 70   Temp 98.6  F (37  C)   Resp 22   Ht 1.727 m (5' 8\")   Wt 68.5 kg (151 lb)   SpO2 98%   BMI 22.96 kg/m    Body mass index is 22.96 kg/m .  Physical Exam     GENERAL: healthy, alert and no distress  RESP: speaking in full sentences, normal work of breathing   CV: RRR, extremities well perfused  ABDOMEN: soft, nontender  MS: no gross musculoskeletal defects noted, no edema  PSYCH: mentation appears normal, " affect normal/bright             Signed Electronically by: Josh Garcia MD

## 2024-11-12 ENCOUNTER — OFFICE VISIT (OUTPATIENT)
Dept: FAMILY MEDICINE | Facility: CLINIC | Age: 89
End: 2024-11-12
Payer: COMMERCIAL

## 2024-11-12 VITALS
DIASTOLIC BLOOD PRESSURE: 66 MMHG | HEART RATE: 63 BPM | SYSTOLIC BLOOD PRESSURE: 115 MMHG | OXYGEN SATURATION: 99 % | HEIGHT: 68 IN | BODY MASS INDEX: 23.95 KG/M2 | WEIGHT: 158 LBS

## 2024-11-12 DIAGNOSIS — N18.30 STAGE 3 CHRONIC KIDNEY DISEASE, UNSPECIFIED WHETHER STAGE 3A OR 3B CKD (H): ICD-10-CM

## 2024-11-12 DIAGNOSIS — Z00.00 WELLNESS EXAMINATION: Primary | ICD-10-CM

## 2024-11-12 DIAGNOSIS — Z23 NEED FOR TDAP VACCINATION: ICD-10-CM

## 2024-11-12 DIAGNOSIS — Z29.11 NEED FOR VACCINATION AGAINST RESPIRATORY SYNCYTIAL VIRUS: ICD-10-CM

## 2024-11-12 DIAGNOSIS — Z00.00 ENCOUNTER FOR MEDICARE ANNUAL WELLNESS EXAM: Primary | ICD-10-CM

## 2024-11-12 RX ORDER — HYDROCHLOROTHIAZIDE 25 MG/1
1 TABLET ORAL DAILY
COMMUNITY

## 2024-11-12 RX ORDER — LISINOPRIL 20 MG/1
20 TABLET ORAL DAILY
COMMUNITY

## 2024-11-12 SDOH — HEALTH STABILITY: PHYSICAL HEALTH: ON AVERAGE, HOW MANY DAYS PER WEEK DO YOU ENGAGE IN MODERATE TO STRENUOUS EXERCISE (LIKE A BRISK WALK)?: 2 DAYS

## 2024-11-12 SDOH — HEALTH STABILITY: PHYSICAL HEALTH: ON AVERAGE, HOW MANY MINUTES DO YOU ENGAGE IN EXERCISE AT THIS LEVEL?: 40 MIN

## 2024-11-12 ASSESSMENT — SOCIAL DETERMINANTS OF HEALTH (SDOH)
HOW OFTEN DO YOU GET TOGETHER WITH FRIENDS OR RELATIVES?: ONCE A WEEK
HOW OFTEN DO YOU GET TOGETHER WITH FRIENDS OR RELATIVES?: ONCE A WEEK

## 2024-11-12 NOTE — PROGRESS NOTES
Preventive Care Visit  [facility]  Goyo Lofton MD, Family Medicine  Nov 12, 2024    Assessment & Plan     Encounter for Medicare annual wellness exam  Need for Tdap vaccination  Need for vaccination against respiratory syncytial virus  Kevin is a very pleasant 89 year old gentleman here for annual medicare visit. PMH notable for pre diabetes, essential htn, CKD3. Also w/ known BPH, [facility] who started him on flomax, LUTS symptoms greatly improved. Has not had A1c in three years, has remained in pre diabetic range for years. Lives in a single unit apartment by himself, only real exercise is walking multiple flights of stairs in his building per day. No recent falls, passed time up and go test. Passed minicog testing today. Hearing aides working appropriately. BP at goal. Has had flu and covid vaccines this year. Due for RSV and TDAP vaccines, knows to ask for this at the pharmacy when he goes next. No refills needed.    Stage 3 chronic kidney disease (H)  Unsure when he was last seen by Nephrology. Due for microalbumin today but unfortunately unable to give a sample, will have him back for RN only lab visit in coming weeks to get this taken care of.  - Albumin Random Urine Quantitative with Creat Ratio [[id number]] FUTURE    Counseling  Appropriate preventive services were addressed with this patient via screening, questionnaire, or discussion as appropriate for fall prevention, nutrition, physical activity, Tobacco-use cessation, social engagement, weight loss and cognition.  Checklist reviewing preventive services available has been given to the patient.  Reviewed patient's diet, addressing concerns and/or questions.   He is at risk for lack of exercise and has been provided with information to increase physical activity for the benefit of his well-being.   The patient was instructed to see the dentist every 6 months.   Updated plan of care.  Patient  reported difficulty with activities of daily living were addressed today.    No follow-ups on file.    Subjective   Kevin is a 89 year old, presenting for the following:  Wellness Visit        11/12/2024     9:10 AM   Additional Questions   Roomed by Evangelina COLEY   Accompanied by self         11/12/2024    Information    services provided? No           HPI    88 yo M w/ pmh of prediabetes, HTN, CKD3, BPH s/p TURP  - Recently started on tamsulosin for BPH, reports improved LUTS  Care gaps: microalbumin, rsv, dtap    Health Care Directive  Patient does not have a Health Care Directive: Discussed advance care planning with patient; information given to patient to review.      11/12/2024   General Health   How would you rate your overall physical health? Good   Feel stress (tense, anxious, or unable to sleep) Not at all          11/12/2024   Nutrition   Diet: Regular (no restrictions)          11/12/2024   Exercise   Days per week of moderate/strenous exercise 2 days   Average minutes spent exercising at this level 40 min      (!) EXERCISE CONCERN      11/12/2024   Social Factors   Frequency of gathering with friends or relatives Once a week   Worry food won't last until get money to buy more No   Food not last or not have enough money for food? No   Do you have housing? (Housing is defined as stable permanent housing and does not include staying ouside in a car, in a tent, in an abandoned building, in an overnight shelter, or couch-surfing.) Yes   Are you worried about losing your housing? No   Lack of transportation? No   Unable to get utilities (heat,electricity)? No          11/12/2024   Fall Risk   Fallen 2 or more times in the past year? No    Trouble with walking or balance? No        Patient-reported          11/12/2024   Activities of Daily Living- Home Safety   Needs help with the following daily activites Transportation   Safety concerns in the home None of the above          11/12/2024    Dental   Dentist two times every year? (!) NO          11/12/2024   Hearing Screening   Hearing concerns? None of the above          11/12/2024   Driving Risk Screening   Patient/family members have concerns about driving No          11/12/2024   General Alertness/Fatigue Screening   Have you been more tired than usual lately? No            11/12/2024   Urinary Incontinence Screening   Bothered by leaking urine in past 6 months No          11/12/2024   TB Screening   Were you born outside of the [place]? No      Today's PHQ-2 Score:       11/12/2024     9:10 AM   PHQ-2 ( 1999 [employer])   Q1: Little interest or pleasure in doing things 0    Q2: Feeling down, depressed or hopeless 0    PHQ-2 Score 0    Q1: Little interest or pleasure in doing things Not at all   Q2: Feeling down, depressed or hopeless Not at all   PHQ-2 Score 0       Patient-reported         11/12/2024   Substance Use   Alcohol more than 3/day or more than 7/wk No   Do you have a current opioid prescription? No   How severe/bad is pain from 1 to 10? 0/10 (No Pain)   Do you use any other substances recreationally? No      Social History     Tobacco Use    Smoking status: Never     Passive exposure: Never    Smokeless tobacco: Never    Tobacco comments:     quit 40 years prior   Substance Use Topics    Alcohol use: Yes     Comment: 1 Beer once in a while    Drug use: No     Reviewed and updated as needed this visit by Provider                    Current providers sharing in care for this patient include:  Patient Care Team:  Goyo Lofton MD as PCP - General (Family Medicine)  Natali Weston AuD as Audiologist (Audiology)  Natali Weston AuD as Audiologist (Audiology)  Goyo Lofton MD as Assigned PCP    The following health maintenance items are reviewed in Epic and correct as of today:  Health Maintenance   Topic Date Due    RSV VACCINE (1 - 1-dose 75+ series) Never done    MICROALBUMIN  08/02/2023    DTAP/TDAP/TD IMMUNIZATION (2 - Td or  "Tdap) 08/14/2023    LIPID  02/27/2025    HEMOGLOBIN  02/27/2025    BMP  04/02/2025    MEDICARE ANNUAL WELLNESS VISIT  11/12/2025    FALL RISK ASSESSMENT  11/12/2025    ADVANCE CARE PLANNING  11/12/2029    PHQ-2 (once per calendar year)  Completed    INFLUENZA VACCINE  Completed    Pneumococcal Vaccine: 65+ Years  Completed    URINALYSIS  Completed    ZOSTER IMMUNIZATION  Completed    COVID-19 Vaccine  Completed    HPV IMMUNIZATION  Aged Out    MENINGITIS IMMUNIZATION  Aged Out    RSV MONOCLONAL ANTIBODY  Aged Out     Review of Systems  Constitutional, HEENT, cardiovascular, pulmonary, gi and gu systems are negative, except as otherwise noted.     Objective    Exam  /66 (BP Location: Right arm, Patient Position: Sitting, Cuff Size: Adult Regular)   Pulse 63   Ht 1.727 m (5' 8\")   Wt 71.7 kg (158 lb)   SpO2 99%   BMI 24.02 kg/m     Estimated body mass index is 24.02 kg/m  as calculated from the following:    Height as of this encounter: 1.727 m (5' 8\").    Weight as of this encounter: 71.7 kg (158 lb).    Physical Exam  GENERAL: alert and no distress  EYES: Eyes grossly normal to inspection, PERRL and conjunctivae and sclerae normal  HENT: Hearing aids in place, functioning properly. ear canals and TM's normal, nose and mouth without ulcers or lesions  NECK: no adenopathy, no asymmetry, masses, or scars. Thyroid smooth and mobile.  RESP: lungs clear to auscultation - no rales, rhonchi or wheezes  CV: regular rate and rhythm, normal S1 S2, no S3 or S4, no murmur, click or rub, no peripheral edema  ABDOMEN: soft, nontender, no hepatosplenomegaly, no masses and bowel sounds normal  MS: no gross musculoskeletal defects noted, no edema  SKIN: no suspicious lesions or rashes  NEURO: Normal strength and tone, mentation intact and speech normal  PSYCH: mentation appears normal, affect normal/bright        11/12/2024   Mini Cog   Clock Draw Score 2 Normal   3 Item Recall 3 objects recalled   Mini Cog Total Score 5 "        Signed Electronically by: Goyo Lofton MD

## 2024-11-12 NOTE — PATIENT INSTRUCTIONS
Patient Education   Preventive Care Advice   This is general advice given by our system to help you stay healthy. However, your care team may have specific advice just for you. Please talk to your care team about your preventive care needs.  Nutrition  Eat 5 or more servings of fruits and vegetables each day.  Try wheat bread, brown rice and whole grain pasta (instead of white bread, rice, and pasta).  Get enough calcium and vitamin D. Check the label on foods and aim for 100% of the RDA (recommended daily allowance).  Lifestyle  Exercise at least 150 minutes each week  (30 minutes a day, 5 days a week).  Do muscle strengthening activities 2 days a week. These help control your weight and prevent disease.  No smoking.  Wear sunscreen to prevent skin cancer.  Have a dental exam and cleaning every 6 months.  Yearly exams  See your health care team every year to talk about:  Any changes in your health.  Any medicines your care team has prescribed.  Preventive care, family planning, and ways to prevent chronic diseases.  Shots (vaccines)   HPV shots (up to age 26), if you've never had them before.  Hepatitis B shots (up to age 59), if you've never had them before.  COVID-19 shot: Get this shot when it's due.  Flu shot: Get a flu shot every year.  Tetanus shot: Get a tetanus shot every 10 years.  Pneumococcal, hepatitis A, and RSV shots: Ask your care team if you need these based on your risk.  Shingles shot (for age 50 and up)  General health tests  Diabetes screening:  Starting at age 35, Get screened for diabetes at least every 3 years.  If you are younger than age 35, ask your care team if you should be screened for diabetes.  Cholesterol test: At age 39, start having a cholesterol test every 5 years, or more often if advised.  Bone density scan (DEXA): At age 50, ask your care team if you should have this scan for osteoporosis (brittle bones).  Hepatitis C: Get tested at least once in your life.  STIs (sexually  transmitted infections)  Before age 24: Ask your care team if you should be screened for STIs.  After age 24: Get screened for STIs if you're at risk. You are at risk for STIs (including HIV) if:  You are sexually active with more than one person.  You don't use condoms every time.  You or a partner was diagnosed with a sexually transmitted infection.  If you are at risk for HIV, ask about PrEP medicine to prevent HIV.  Get tested for HIV at least once in your life, whether you are at risk for HIV or not.  Cancer screening tests  Cervical cancer screening: If you have a cervix, begin getting regular cervical cancer screening tests starting at age 21.  Breast cancer scan (mammogram): If you've ever had breasts, begin having regular mammograms starting at age 40. This is a scan to check for breast cancer.  Colon cancer screening: It is important to start screening for colon cancer at age 45.  Have a colonoscopy test every 10 years (or more often if you're at risk) Or, ask your provider about stool tests like a FIT test every year or Cologuard test every 3 years.  To learn more about your testing options, visit:   .  For help making a decision, visit:   https://bit.ly/ln35004.  Prostate cancer screening test: If you have a prostate, ask your care team if a prostate cancer screening test (PSA) at age 55 is right for you.  Lung cancer screening: If you are a current or former smoker ages 50 to 80, ask your care team if ongoing lung cancer screenings are right for you.  For informational purposes only. Not to replace the advice of your health care provider. Copyright   2023 [employer]. All rights reserved. Clinically reviewed by the [facility] Transitions Program. [employer] [id number] - REV 01/24.

## 2025-03-12 DIAGNOSIS — I10 BENIGN ESSENTIAL HYPERTENSION: ICD-10-CM

## 2025-03-13 RX ORDER — AMLODIPINE BESYLATE 5 MG/1
5 TABLET ORAL AT BEDTIME
Qty: 90 TABLET | Refills: 3 | Status: SHIPPED | OUTPATIENT
Start: 2025-03-13

## 2025-04-09 ENCOUNTER — OFFICE VISIT (OUTPATIENT)
Dept: FAMILY MEDICINE | Facility: CLINIC | Age: 89
End: 2025-04-09
Payer: COMMERCIAL

## 2025-04-09 VITALS
SYSTOLIC BLOOD PRESSURE: 112 MMHG | OXYGEN SATURATION: 96 % | BODY MASS INDEX: 24.4 KG/M2 | DIASTOLIC BLOOD PRESSURE: 69 MMHG | HEART RATE: 90 BPM | WEIGHT: 161 LBS | RESPIRATION RATE: 20 BRPM | HEIGHT: 68 IN | TEMPERATURE: 97.6 F

## 2025-04-09 DIAGNOSIS — N18.31 STAGE 3A CHRONIC KIDNEY DISEASE (H): ICD-10-CM

## 2025-04-09 DIAGNOSIS — I10 BENIGN ESSENTIAL HYPERTENSION: Primary | ICD-10-CM

## 2025-04-09 LAB
ANION GAP SERPL CALCULATED.3IONS-SCNC: 9 MMOL/L (ref 3–14)
BUN SERPL-MCNC: 17 MG/DL (ref 7–30)
CALCIUM SERPL-MCNC: 9.7 MG/DL (ref 8.5–10.1)
CHLORIDE BLD-SCNC: 101 MMOL/L (ref 94–109)
CO2 SERPL-SCNC: 26 MMOL/L (ref 20–32)
CREAT SERPL-MCNC: 1.1 MG/DL (ref 0.66–1.25)
EGFRCR SERPLBLD CKD-EPI 2021: 64 ML/MIN/1.73M2
ERYTHROCYTE [DISTWIDTH] IN BLOOD BY AUTOMATED COUNT: 14.3 % (ref 10–15)
GLUCOSE BLD-MCNC: 111 MG/DL (ref 70–99)
HCT VFR BLD AUTO: 37.6 % (ref 40–53)
HGB BLD-MCNC: 12 G/DL (ref 13.3–17.7)
MCH RBC QN AUTO: 30.3 PG (ref 26.5–33)
MCHC RBC AUTO-ENTMCNC: 31.9 G/DL (ref 31.5–36.5)
MCV RBC AUTO: 95 FL (ref 78–100)
PLATELET # BLD AUTO: 149 10E3/UL (ref 150–450)
POTASSIUM BLD-SCNC: 4.7 MMOL/L (ref 3.4–5.3)
RBC # BLD AUTO: 3.96 10E6/UL (ref 4.4–5.9)
SODIUM SERPL-SCNC: 136 MMOL/L (ref 135–145)
WBC # BLD AUTO: 7 10E3/UL (ref 4–11)

## 2025-04-09 RX ORDER — POLYETHYLENE GLYCOL 3350 17 G/17G
17 POWDER, FOR SOLUTION ORAL 2 TIMES DAILY
Qty: 3060 G | Status: CANCELLED | OUTPATIENT
Start: 2025-04-09

## 2025-04-09 ASSESSMENT — ENCOUNTER SYMPTOMS: CONSTITUTIONAL NEGATIVE: 1

## 2025-04-09 NOTE — PROGRESS NOTES
Assessment and Plan     1. Benign essential hypertension (Primary)  Controlled. Continue current management. Recheck in 6 months. Kidney function and electrolytes look good.    2. Stage 3a chronic kidney disease (H)  GFR holding steady/better than before. Holding ACEi/ARB due to previous hyperkalemia. Could consider addition of SGLT2i. Patient needs to take the urine home and return.  - Albumin Random Urine Quantitative with Creat Ratio; Future  - Basic metabolic panel; Future  - CBC with platelets; Future  - Albumin Random Urine Quantitative with Creat Ratio  - Basic metabolic panel  - CBC with platelets    The longitudinal plan of care for the diagnosis(es)/condition(s) as documented were addressed during this visit. Due to the added complexity in care, I will continue to support Kevin in the subsequent management and with ongoing continuity of care.    Options for treatment and follow-up care were reviewed with the patient and/or guardian. Kevin Saldana and/or guardian engaged in the decision making process and verbalized understanding of the options discussed and agreed with the final plan. I answered all of their questions.    This note was created with the aid of dictation software. Any mistakes are unintentional.    Jelani Huitron III, MD, FAAFP  [facility]  04/09/25 7:42 AM           HPI:       Kevin Saldana is a 89 year old  male  No chief complaint on file.      Patient presents to follow-up on hypertension and stage IIIa kidney disease.  About a year ago, was taken off of lisinopril secondary to hyperkalemia.  Last blood pressures on amlodipine have been well-controlled. The patient denies signs and symptoms of orthostatic hypotension.         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 90 tablet 3    apixaban ANTICOAGULANT (ELIQUIS) 5 MG tablet Take 1 tablet (5 mg) by mouth 2 times daily 180 tablet 3    hydrochlorothiazide (HYDRODIURIL) 25 MG tablet Take 1 tablet by mouth daily.      lisinopril (ZESTRIL) 20 MG tablet Take 20 mg by mouth daily.      metoprolol tartrate (LOPRESSOR) 100 MG tablet Take 1 tablet (100 mg) by mouth 2 times daily 180 tablet 3    order for DME Equipment being ordered: FRANCISCO JAVIER stockings 1 Device 0    polyethylene glycol (MIRALAX) 17 GM/Dose powder Take 17 g by mouth 2 times daily 3060 g 3    tamsulosin (FLOMAX) 0.4 MG capsule Take 0.4 mg by mouth daily.         Social History     Socioeconomic History    Marital status:      Spouse name: Not on file    Number of children: Not on file    Years of education: Not on file    Highest education level: Not on file   Occupational History    Not on file   Tobacco Use    Smoking status: Never     Passive exposure: Never    Smokeless tobacco: Never    Tobacco comments:     quit 40 years prior   Substance and Sexual Activity    Alcohol use: Yes     Comment: 1 Beer once in a while    Drug use: No    Sexual activity: Not Currently     Partners: Female   Other Topics Concern    Parent/sibling w/ CABG, MI or angioplasty before 65F 55M? Not Asked   Social History Narrative    Not on file     Social Drivers of Health     Financial Resource Strain: Low Risk  (11/12/2024)    Financial Resource Strain     Within the past 12 months, have you or your family members you live with  been unable to get utilities (heat, electricity) when it was really needed?: No   Food Insecurity: Low Risk  (11/12/2024)    Food Insecurity     Within the past 12 months, did you worry that your food would run out before you got money to buy more?: No     Within the past 12 months, did the food you bought just not last and you didn t have money to get more?: No   Transportation Needs: Low Risk  (11/12/2024)    Transportation Needs     Within the past 12 months, has lack of transportation kept you from medical appointments, getting your medicines, non-medical meetings or appointments, work, or from getting things that you need?: No   Physical Activity: Insufficiently Active (11/12/2024)    Exercise Vital Sign     Days of Exercise per Week: 2 days     Minutes of Exercise per Session: 40 min   Stress: No Stress Concern Present (11/12/2024)    [employer] - Occupational Stress Questionnaire     Feeling of Stress : Not at all   Social Connections: Unknown (11/12/2024)    Social Connection and Isolation Panel [NHANES]     Frequency of Communication with Friends and Family: Not on file     Frequency of Social Gatherings with Friends and Family: Once a week     Attends [employer] Services: Not on file     Active Member of Clubs or Organizations: Not on file     Attends Club or Organization Meetings: Not on file     Marital Status: Not on file   Interpersonal Safety: Not At Risk (9/23/2024)    Received from [facility]    Humiliation, Afraid, Rape, and Kick questionnaire     Fear of Current or Ex-Partner: No     Emotionally Abused: No     Physically Abused: No     Sexually Abused: No   Housing Stability: Low Risk  (11/12/2024)    Housing Stability     Do you have housing? : Yes     Are you worried about losing your housing?: No       Allergies   Allergen Reactions    Nkda [No Known Drug Allergy]        No results found for this or any previous visit (from the past 24 hours).         Review of  Systems:     Review of Systems   Constitutional: Negative.             Physical Exam:     There were no vitals filed for this visit.  There is no height or weight on file to calculate BMI.    Physical Exam  Vitals and nursing note reviewed.   Constitutional:       General: He is not in acute distress.     Appearance: Normal appearance. He is not ill-appearing, toxic-appearing or diaphoretic.   Pulmonary:      Effort: No respiratory distress.   Neurological:      Mental Status: He is alert and oriented to person, place, and time.

## 2025-05-05 DIAGNOSIS — I48.92 ATRIAL FLUTTER BY ELECTROCARDIOGRAM (H): ICD-10-CM

## 2025-05-06 RX ORDER — METOPROLOL TARTRATE 100 MG/1
100 TABLET ORAL 2 TIMES DAILY
Qty: 180 TABLET | Refills: 1 | Status: SHIPPED | OUTPATIENT
Start: 2025-05-06

## 2025-07-29 DIAGNOSIS — K59.00 CONSTIPATION, UNSPECIFIED CONSTIPATION TYPE: ICD-10-CM

## 2025-07-29 RX ORDER — POLYETHYLENE GLYCOL 3350 17 G/17G
17 POWDER, FOR SOLUTION ORAL 2 TIMES DAILY
Qty: 3060 G | Refills: 0 | Status: SHIPPED | OUTPATIENT
Start: 2025-07-29